# Patient Record
Sex: MALE | Race: WHITE | NOT HISPANIC OR LATINO | Employment: OTHER | URBAN - NONMETROPOLITAN AREA
[De-identification: names, ages, dates, MRNs, and addresses within clinical notes are randomized per-mention and may not be internally consistent; named-entity substitution may affect disease eponyms.]

---

## 2017-12-13 ENCOUNTER — HOSPITAL ENCOUNTER (OUTPATIENT)
Dept: OCCUPATIONAL THERAPY | Facility: HOSPITAL | Age: 52
Setting detail: THERAPIES SERIES
Discharge: HOME | End: 2017-12-13

## 2017-12-13 DIAGNOSIS — R60.0 BILATERAL LOWER EXTREMITY EDEMA: Primary | ICD-10-CM

## 2017-12-13 PROCEDURE — 97167 OT EVAL HIGH COMPLEX 60 MIN: CPT

## 2017-12-13 PROCEDURE — 97139 UNLISTED THERAPEUTIC PX: CPT

## 2017-12-13 PROCEDURE — 97535 SELF CARE MNGMENT TRAINING: CPT

## 2017-12-13 NOTE — THERAPY EVALUATION
Outpatient Occupational Therapy Lymphedema Initial Evaluation   [place]     Patient Name: El Nielsen  : 1965  MRN: [id number]  Today's Date: 2017      Visit Date: 2017    There is no problem list on file for this patient.       Past Medical History:   Diagnosis Date   • Arthritis    • Hypertension         Past Surgical History:   Procedure Laterality Date   • EYE SURGERY     • VASCULAR SURGERY           Visit Dx:     ICD-10-CM ICD-9-CM   1. Bilateral lower extremity edema R60.0 782.3             Patient History       17 1400          History    Chief Complaint Balance Problems;Difficulty Walking;Difficulty with daily activities;Fatigue/poor endurance;Impaired sensation;Joint stiffness;Joint swelling;Pain;Numbness;Tingling  -BC      Type of Pain Ankle pain;Foot pain;Knee pain;Lower Extremity / Leg  -BC      Date Current Problem(s) Began 14  -BC      Brief Description of Current Complaint B LE swelling  -BC      Previous treatment for THIS PROBLEM Medication;Surgery  -BC      Onset Date- OT --   2017  -BC      Surgery Date: 17  -BC      Patient/Caregiver Goals Relieve pain;Return to prior level of function;Improve mobility;Know what to do to help the symptoms;Decrease swelling  -BC      Current Tobacco Use < 1 pack daily  -BC      Patient's Rating of General Health Fair  -BC      Hand Dominance right-handed  -BC      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 No  -BC      How has patient tried to help current problem? surgery, medicine  -BC      What clinical tests have you had for this problem? --   ultrasound  -BC      Related/Recent Hospitalizations Yes  -BC      Surgery Date 1 17   Approximate date  -BC      History of Previous Related Injuries L knee scope x 3  -BC      Pain     Pain Location Foot;Knee;Leg  -BC      Pain at Present 3  -BC      Pain  at Best 0  -BC      Pain at Worst 7  -BC      Pain Frequency Constant/continuous  -BC      Pain Description Aching;Numbness;Penetrating;Pins and needles;Sharp;Shooting;Tightness;Tender;Throbbing  -BC      What Performance Factors Make the Current Problem(s) WORSE? Being up on it  -BC      What Performance Factors Make the Current Problem(s) BETTER? Pain medications (over the counter)  -BC      Pain Comments Aleive helps  -BC      Tolerance Time- Standing 60-90 minutes  -BC      Tolerance Time- Walking 30 min.  -BC      Is your sleep disturbed? No  -BC      Is medication used to assist with sleep? No  -BC      Total hours of sleep per night 4-5 hours  -BC      What position do you sleep in? Left sidelying;Supine  -BC      Difficulties at work? With standing  -BC      Difficulties with ADL's? yes  -BC      Difficulties with recreational activities? yes  -BC      Fall Risk Assessment    Any falls in the past year: No  -BC      Daily Activities    Primary Language English  -BC      How does patient learn best? Demonstration  -BC      Patient is concerned about/has problems with Difficulty with self care (i.e. bathing, dressing, toileting:;Coordination;Performing job responsibilities/community activities (work, school,  -BC      Pt Participated in POC and Goals Yes  -BC      Safety    Are you being hurt, hit, or frightened by anyone at home or in your life? No  -BC      Are you being neglected by a caregiver No  -BC        User Key  (r) = Recorded By, (t) = Taken By, (c) = Cosigned By    Initials Name Provider Type    [facility], OT Occupational Therapist                Lymphedema       12/13/17 1400          Lymphedema Assessment    Lymphedema Classification RLE:;LLE:;primary;stage 1 (Spontaneously Reversible);stage 2 (Spontaneously Irreversible)  -BC      Infections or Cellulitis? unspecified  -BC      Infection/Cellulitis Treatment antibiotics  -BC      Lymphedema Assessment Comments Pt. with B LE lymphedema.   -BC      Skin Changes/Observations    Location/Assessment Lower Extremity  -BC      Lower Extremity Conditions bilateral:;clean;dry;scaly  -BC      Lower Extremity Color/Pigment hyperpigmented;bilateral:;red;purple  -BC      Skin Observations Comment very dry  -BC      Lymphedema Sensation    Lymphedema Sensation Reports RLE:;LLE:;numbness;tingling  -BC      Lymphedema Sensation Tests light touch;deep touch  -BC      Lymphedema Measurements    Measurement Type(s) Circumferential  -BC      Compression/Skin Care    Compression/Skin Care skin care  -BC      Skin Care lotion applied;moisturizing lotion applied  -BC      Compression/Skin Care Comments educated patient to skin care requirements.  -BC        User Key  (r) = Recorded By, (t) = Taken By, (c) = Cosigned By    Initials Name Provider Type    TRACEY Bowman, OT Occupational Therapist                  Therapy Education  Given: Symptoms/condition management, Pain management, Edema management  Program: New  How Provided: Verbal, Demonstration  Provided to: Patient  Level of Understanding: Verbalized                  OT Goals       12/13/17 1500       OT Short Term Goals    STG Date to Achieve 12/27/17  -BC     STG 1 Pt. famaliar with precautions skin care and self management of lymphedema.  -BC     STG 2 Pt. HEP to assist with improved lymphatic flow  -BC     STG 3 Self care instructions for home MLD for volume reduction.  -BC     STG 4 Reduction of volume by 5% to reduce risk of infection.  -BC     Long Term Goals    LTG Date to Achieve 01/13/18  -BC     LTG 1 Pt. and/or caregiver independent with short stretch compression bandaging for volume reduction.  -BC     LTG 2 Independent with donning/doffing compression garment.  -BC     LTG 3 Independent with lymphedema management and HEP  -BC     Time Calculation    OT Goal Re-Cert Due Date 01/13/18  -BC       User Key  (r) = Recorded By, (t) = Taken By, (c) = Cosigned By    Initials Name Provider Type    TRACEY Gamez  [place], OT Occupational Therapist                OT Assessment/Plan       12/13/17 1547       OT Assessment    Functional Limitations Limitation in home management;Limitations in community activities;Performance in self-care ADL;Limitations in functional capacity and performance  -BC     Impairments Edema;Endurance;Impaired flexibility;Impaired lymphatic circulation;Range of motion;Pain  -BC     Assessment Comments Pt. presents on this date with lymphedema in B LE, measurements taken and POC established.  Pt to begin MLD, and full lymph tx. on follow up visit.  Pt. educated to skin care, and lotion applied to B LE (cocoa butter)  -BC     Please refer to paper survey for additional self-reported information Yes  -BC     OT Diagnosis B LE Lymphedema  -BC     OT Rehab Potential Excellent  -BC     Patient/caregiver participated in establishment of treatment plan and goals Yes  -BC     Patient would benefit from skilled therapy intervention Yes  -BC     OT Plan    OT Frequency 3x/week  -BC     Predicted Duration of Therapy Intervention (days/wks) 4 weeks  -BC     Planned CPT's? OT EVAL HIGH COMPLEXITY: 31603;OT THER ACT EA 15 MIN: 54410CC;OT MANUAL THERAPY EA 15 MIN: 67570;OT SELF CARE/MGMT/TRAIN 15 MIN: 40102;OT VASOPNEUMATIC DEVICE: 51174  -BC     Planned Therapy Interventions (Optional Details) home exercise program;manual therapy techniques;stretching;ROM (Range of Motion);patient/family education;other (see comments)  -BC     OT Plan Comments MLD education  -BC       User Key  (r) = Recorded By, (t) = Taken By, (c) = Cosigned By    Initials Name Provider Type    [facility], OT Occupational Therapist          Outcome Measure Options: AM-PAC 6 Clicks Daily Activity (OT)  How much help from another is currently needed...  Putting on and taking off regular lower body clothing?: a little  Bathing (including washing, rinsing, and drying): a little  Toileting (which includes using toilet bed pan or urinal):  none  Putting on and taking off regular upper body clothing: none  Taking care of personal grooming (such as brushing teeth): none  Eating meals: none  Score: 22         Time Calculation:   OT Start Time: 1400  OT Stop Time: 1520  OT Time Calculation (min): 80 min     Therapy Charges for Today     Code Description Service Date Service Provider Modifiers Qty    48815668648 HC OT EVAL HIGH COMPLEXITY 3 2017 [facility], OT GO 1    98129754900 HC LYMPHEDEMA MANAGEMENT-15 MIN 2017 [facility], OT  1    91252241149 HC OT SELF CARE/MGMT/TRAIN EA 15 MIN 2017 [facility], OT GO 1                    [facility], OT  2017 and Outpatient Occupational Therapy Lymphedema Treatment Note  CASSANDRA Alonso     Patient Name: El Nielsen  : 1965  MRN: [id number]  Today's Date: 2017      Visit Date: 2017    There is no problem list on file for this patient.       Past Medical History:   Diagnosis Date   • Arthritis    • Hypertension         Past Surgical History:   Procedure Laterality Date   • EYE SURGERY     • VASCULAR SURGERY           Visit Dx:      ICD-10-CM ICD-9-CM   1. Bilateral lower extremity edema R60.0 782.3             Lymphedema       17 1400          Lymphedema Assessment    Lymphedema Classification RLE:;LLE:;primary;stage 1 (Spontaneously Reversible);stage 2 (Spontaneously Irreversible)  -BC      Infections or Cellulitis? unspecified  -BC      Infection/Cellulitis Treatment antibiotics  -BC      Lymphedema Assessment Comments Pt. with B LE lymphedema.  -BC      Skin Changes/Observations    Location/Assessment Lower Extremity  -BC      Lower Extremity Conditions bilateral:;clean;dry;scaly  -BC      Lower Extremity Color/Pigment hyperpigmented;bilateral:;red;purple  -BC      Skin Observations Comment very dry  -BC      Lymphedema Sensation    Lymphedema Sensation Reports RLE:;LLE:;numbness;tingling  -BC      Lymphedema Sensation Tests light touch;deep touch   -BC      Lymphedema Measurements    Measurement Type(s) Circumferential  -BC      Compression/Skin Care    Compression/Skin Care skin care  -BC      Skin Care lotion applied;moisturizing lotion applied  -BC      Compression/Skin Care Comments educated patient to skin care requirements.  -BC        User Key  (r) = Recorded By, (t) = Taken By, (c) = Cosigned By    Initials Name Provider Type    [facility], OT Occupational Therapist                        OT Assessment/Plan       12/13/17 4094       OT Assessment    Functional Limitations Limitation in home management;Limitations in community activities;Performance in self-care ADL;Limitations in functional capacity and performance  -BC     Impairments Edema;Endurance;Impaired flexibility;Impaired lymphatic circulation;Range of motion;Pain  -BC     Assessment Comments Pt. presents on this date with lymphedema in B LE, measurements taken and POC established.  Pt to begin MLD, and full lymph tx. on follow up visit.  Pt. educated to skin care, and lotion applied to B LE (cocoa butter)  -BC     Please refer to paper survey for additional self-reported information Yes  -BC     OT Diagnosis B LE Lymphedema  -BC     OT Rehab Potential Excellent  -BC     Patient/caregiver participated in establishment of treatment plan and goals Yes  -BC     Patient would benefit from skilled therapy intervention Yes  -BC     OT Plan    OT Frequency 3x/week  -BC     Predicted Duration of Therapy Intervention (days/wks) 4 weeks  -BC     Planned CPT's? OT EVAL HIGH COMPLEXITY: 23749;OT THER ACT EA 15 MIN: 47368GU;OT MANUAL THERAPY EA 15 MIN: 89767;OT SELF CARE/MGMT/TRAIN 15 MIN: 45031;OT VASOPNEUMATIC DEVICE: 56206  -BC     Planned Therapy Interventions (Optional Details) home exercise program;manual therapy techniques;stretching;ROM (Range of Motion);patient/family education;other (see comments)  -BC     OT Plan Comments MLD education  -BC       User Key  (r) = Recorded By, (t) = Taken  By, (c) = Cosigned By    Initials Name Provider Type    [facility], OT Occupational Therapist                            OT Goals       12/13/17 1500       OT Short Term Goals    STG Date to Achieve 12/27/17  -BC     STG 1 Pt. famaliar with precautions skin care and self management of lymphedema.  -BC     STG 2 Pt. HEP to assist with improved lymphatic flow  -BC     STG 3 Self care instructions for home MLD for volume reduction.  -BC     STG 4 Reduction of volume by 5% to reduce risk of infection.  -BC     Long Term Goals    LTG Date to Achieve 01/13/18  -BC     LTG 1 Pt. and/or caregiver independent with short stretch compression bandaging for volume reduction.  -BC     LTG 2 Independent with donning/doffing compression garment.  -BC     LTG 3 Independent with lymphedema management and HEP  -BC     Time Calculation    OT Goal Re-Cert Due Date 01/13/18  -BC       User Key  (r) = Recorded By, (t) = Taken By, (c) = Cosigned By    Initials Name Provider Type    [facility], OT Occupational Therapist          Therapy Education  Given: Symptoms/condition management, Pain management, Edema management  Program: New  How Provided: Verbal, Demonstration  Provided to: Patient  Level of Understanding: Verbalized    Outcome Measure Options: AM-PAC 6 Clicks Daily Activity (OT)  How much help from another is currently needed...  Putting on and taking off regular lower body clothing?: a little  Bathing (including washing, rinsing, and drying): a little  Toileting (which includes using toilet bed pan or urinal): none  Putting on and taking off regular upper body clothing: none  Taking care of personal grooming (such as brushing teeth): none  Eating meals: none  Score: 22           Time Calculation:   OT Start Time: 1400  OT Stop Time: 1520  OT Time Calculation (min): 80 min       Therapy Charges for Today     Code Description Service Date Service Provider Modifiers Qty    45611336355 HC OT EVAL HIGH COMPLEXITY 3  12/13/2017 [facility], OT GO 1    [id number] HC LYMPHEDEMA MANAGEMENT-15 MIN 12/13/2017 [facility], OT  1    [id number] HC OT SELF CARE/MGMT/TRAIN EA 15 MIN 12/13/2017 [facility], OT GO 1                      [facility], OT  12/13/2017

## 2017-12-20 PROCEDURE — 97140 MANUAL THERAPY 1/> REGIONS: CPT

## 2017-12-20 PROCEDURE — 97530 THERAPEUTIC ACTIVITIES: CPT

## 2017-12-20 PROCEDURE — 97535 SELF CARE MNGMENT TRAINING: CPT

## 2018-01-24 ENCOUNTER — DOCUMENTATION (OUTPATIENT)
Dept: OCCUPATIONAL THERAPY | Facility: HOSPITAL | Age: 53
End: 2018-01-24

## 2018-01-24 ENCOUNTER — HOSPITAL ENCOUNTER (OUTPATIENT)
Dept: OCCUPATIONAL THERAPY | Facility: HOSPITAL | Age: 53
Setting detail: THERAPIES SERIES
Discharge: HOME | End: 2018-01-24

## 2018-01-24 DIAGNOSIS — R60.0 BILATERAL LOWER EXTREMITY EDEMA: Primary | ICD-10-CM

## 2018-01-24 PROCEDURE — 97140 MANUAL THERAPY 1/> REGIONS: CPT

## 2018-01-24 PROCEDURE — 97167 OT EVAL HIGH COMPLEX 60 MIN: CPT

## 2018-01-24 PROCEDURE — 97139 UNLISTED THERAPEUTIC PX: CPT

## 2018-01-24 NOTE — SIGNIFICANT NOTE
01/24/18 1517   OP OT Discharge Summary   Date of Discharge 01/23/18   Reason for Discharge Unable to participate   Outcomes Achieved Unable to make functional progress toward goals at this time   Discharge Destination Home without follow-up   Discharge Instructions D/C OT POC

## 2018-01-24 NOTE — THERAPY EVALUATION
Outpatient Occupational Therapy Lymphedema Initial Evaluation       Patient Name: El Nielsen  : 1965  MRN: [id number]  Today's Date: 2018      Visit Date: 2018    There is no problem list on file for this patient.       Past Medical History:   Diagnosis Date   • Arthritis    • Hypertension         Past Surgical History:   Procedure Laterality Date   • EYE SURGERY     • VASCULAR SURGERY           Visit Dx:     ICD-10-CM ICD-9-CM   1. Bilateral lower extremity edema R60.0 782.3             Patient History       18 1500          History    Chief Complaint Balance Problems;Difficulty Walking;Difficulty with daily activities;Fatigue/poor endurance;Impaired sensation;Joint stiffness;Joint swelling;Pain;Numbness;Tingling  -BC      Type of Pain Ankle pain;Foot pain;Knee pain;Lower Extremity / Leg  -BC      Date Current Problem(s) Began 14  -BC      Brief Description of Current Complaint B LE swelling  -BC      Previous treatment for THIS PROBLEM Massage;Rehabilitation  -BC      Onset Date- OT 2018  -BC      Patient/Caregiver Goals Relieve pain;Return to prior level of function;Improve mobility;Know what to do to help the symptoms;Decrease swelling  -BC      Current Tobacco Use Non smoker  -BC      Patient's Rating of General Health Fair  -BC      Hand Dominance right-handed  -BC      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 No  -BC      How has patient tried to help current problem? surgery, medicine  -BC      Pain     Pain Location Ankle;Foot;Leg  -BC      Pain at Present 5  -BC      Pain at Best 0  -BC      Pain at Worst 10  -BC      Pain Frequency Constant/continuous  -BC      Pain Description Cramping;Discomfort;Heaviness;Numbness;Pins and needles;Sharp;Shooting  -BC      What Performance Factors Make the Current Problem(s) WORSE? Standing/walking  -BC      What  Performance Factors Make the Current Problem(s) BETTER? Laying down  -BC      Pain Comments Feels like a bee stinging   -BC      Tolerance Time- Standing impaired  -BC      Tolerance Time- Sitting fair  -BC      Tolerance Time- Walking impaired  -BC      Tolerance Time- Lying wfl  -BC      Is your sleep disturbed? Yes  -BC      Is medication used to assist with sleep? No  -BC      Total hours of sleep per night 5-6  -BC      What position do you sleep in? Supine  -BC      Difficulties at work? yes  -BC      Difficulties with ADL's? yes  -BC      Difficulties with recreational activities? yes  -BC      Fall Risk Assessment    Any falls in the past year: No  -BC      Services    Prior Rehab/Home Health Experiences Yes  -BC      When was the prior experience with Rehab/Home Health 1 month previous  -BC      Where was the prior experience with Rehab/Home Health [facility]  -BC      Are you currently receiving Home Health services No  -BC      Daily Activities    Primary Language English  -BC      How does patient learn best? Demonstration  -BC      Patient is concerned about/has problems with Difficulty with self care (i.e. bathing, dressing, toileting:;Flexibility;Performing job responsibilities/community activities (work, school,;Standing;Walking  -BC      Functional Status mobility issues preventing performance of daily activities  -BC      Pt Participated in POC and Goals Yes  -BC      Safety    Are you being hurt, hit, or frightened by anyone at home or in your life? Other (comment)  -BC      Are you being neglected by a caregiver No  -BC        User Key  (r) = Recorded By, (t) = Taken By, (c) = Cosigned By    Initials Name Provider Type    [facility], OT Occupational Therapist                Lymphedema       01/24/18 1500          Subjective Comments    Subjective Comments I use my pump at home , but they seem to be getting worse.  -BC      Lymphedema Assessment    Lymphedema Classification RLE:;LLE:;stage 2  (Spontaneously Irreversible)  -BC      Subjective Pain    Able to rate subjective pain? yes  -BC      Pre-Treatment Pain Level 4  -BC      Lymphedema Edema Assessment    Ptting Edema Category By grade out of 4  -BC      Pitting Edema + 4/4;Moderate  -BC      Skin Changes/Observations    Location/Assessment Lower Extremity  -BC      Lower Extremity Conditions bilateral:;intact;dry;shiny;hairless;scaly  -BC      Lower Extremity Color/Pigment bilateral:;red;blanchable;hyperpigmented  -BC      Lymphedema Sensation    Lymphedema Sensation Reports RLE:;LLE:;numbness;tingling  -BC      Lymphedema Sensation Tests temperature  -BC      Lymphedema Measurements    Measurement Type(s) Circumferential  -BC      Circumferential Areas Lower extremities  -BC      LLE Circumferential (cm)    Measurement Location 1 great toe  -BC      Left 1 10.9 cm  -BC      Measurement Location 2 Base of great toe  -BC      Left 2 31 cm  -BC      Measurement Location 3 ankle  -BC      Left 3 34.3 cm  -BC      Measurement Location 4  10 cm superior to ankle crease  -BC      Left 4 33.3 cm  -BC      Measurement Location 5 20 cm superior to ankle crease  -BC      Left 5 44.4 cm  -BC      Measurement Location 6 30 cm superior to ankle  -BC      Left 6 52.2 cm  -BC      Measurement Location 7 Knee at popliteal crease  -BC      Left 7 47.2 cm  -BC      Measurement Location 8 10 cm. superior to popliteal crease  -BC      Left 8 57 cm  -BC      RLE Circumferential (cm)    Measurement Location 1 great toe  -BC      Right 1 12.3 cm  -BC      Measurement Location 2 Base of great toe  -BC      Right 2 30.9 cm  -BC      Measurement Location 3 ankle  -BC      Right 3 40.8 cm  -BC      Measurement Location 4 10 cm superior to ankle  -BC      Right 4 40.3 cm  -BC      Measurement Location 5 20 cm superior to ankle  -BC      Right 5 52 cm  -BC      Measurement Location 6 30 cm superior to ankle  -BC      Right 6 55 cm  -BC      Measurement Location 7 Knee  -BC       Right 7 46.7 cm  -BC      Measurement Location 8 10cm superior  -BC      Right 8 60.5 cm  -BC      Manual Lymphatic Drainage    Manual Lymphatic Drainage extremity treatment  -BC      Extremity Treatment simple/brief MLD  -BC      Manual Lymphatic Drainage Comments Pt. RLE with incresed tightness/resistance at calf muscle.  -BC      Compression/Skin Care    Compression/Skin Care skin care;bandaging  -BC      Skin Care moisturizing lotion applied  -BC      Bandage Layers cotton liner;soft foam- 1/4 inch;short-stretch bandages (comment size/quantity)   2 each soft foam, and short stretch bandages on BLE.  -BC      Bandaging Technique circumferential/spiral;light compression  -BC        User Key  (r) = Recorded By, (t) = Taken By, (c) = Cosigned By    Initials Name Provider Type    [facility], OT Occupational Therapist                                     OT Goals       01/24/18 1600       OT Short Term Goals    STG Date to Achieve 02/24/18  -BC     STG 1 Pt. famaliar with precautions skin care and self management of lymphedema.  -BC     STG 2 Pt. HEP to assist with improved lymphatic flow  -BC     STG 3 Self care instructions for home MLD for volume reduction.  -BC     STG 4 Reduction of volume by 5% to reduce risk of infection.  -BC     Long Term Goals    LTG Date to Achieve 04/24/18  -BC     LTG 1 Pt. and/or caregiver independent with short stretch compression bandaging for volume reduction.  -BC     LTG 2 Independent with donning/doffing compression garment.  -BC     LTG 3 Independent with lymphedema management and HEP  -BC     Time Calculation    OT Goal Re-Cert Due Date 04/24/18  -BC       User Key  (r) = Recorded By, (t) = Taken By, (c) = Cosigned By    Initials Name Provider Type    [facility], OT Occupational Therapist                OT Assessment/Plan       01/24/18 1656       OT Assessment    Functional Limitations Performance in self-care ADL;Limitations in functional capacity and  performance;Performance in work activities  -BC     Impairments Edema;Endurance;Impaired flexibility;Impaired lymphatic circulation  -BC     Please refer to paper survey for additional self-reported information Yes  -BC     OT Diagnosis B LE lymphedema  -BC     OT Rehab Potential Excellent  -BC     Patient/caregiver participated in establishment of treatment plan and goals Yes  -BC     Patient would benefit from skilled therapy intervention Yes  -BC     OT Plan    OT Frequency 3x/week  -BC     Predicted Duration of Therapy Intervention (days/wks) 90 days  -BC     Planned CPT's? OT EVAL HIGH COMPLEXITY: 75982;OT SELF CARE/MGMT/TRAIN 15 MIN: 81444;OT VASOPNEUMATIC DEVICE: 27709;OT THER ACT EA 15 MIN: 79327JT;OT MANUAL THERAPY EA 15 MIN: 74993   lymphedema management  -BC     Planned Therapy Interventions (Optional Details) home exercise program;manual therapy techniques;patient/family education  -BC       User Key  (r) = Recorded By, (t) = Taken By, (c) = Cosigned By    Initials Name Provider Type    BC Vera Bowman, OT Occupational Therapist          Outcome Measure Options: AM-PAC 6 Clicks Daily Activity (OT)  How much help from another is currently needed...  Putting on and taking off regular lower body clothing?: a little  Bathing (including washing, rinsing, and drying): a little  Toileting (which includes using toilet bed pan or urinal): none  Putting on and taking off regular upper body clothing: none  Taking care of personal grooming (such as brushing teeth): none  Eating meals: none  Score: 22         Time Calculation:   OT Start Time: 1500  OT Stop Time: 1645  OT Time Calculation (min): 105 min                   Vera Bowman OT  2018 and Outpatient Occupational Therapy Lymphedema Treatment Note       Patient Name: El Nielsen  : 1965  MRN: [id number]  Today's Date: 2018      Visit Date: 2018    There is no problem list on file for this patient.       Past Medical History:    Diagnosis Date   • Arthritis    • Hypertension         Past Surgical History:   Procedure Laterality Date   • EYE SURGERY     • VASCULAR SURGERY           Visit Dx:      ICD-10-CM ICD-9-CM   1. Bilateral lower extremity edema R60.0 782.3             Lymphedema       01/24/18 1500          Subjective Comments    Subjective Comments I use my pump at home , but they seem to be getting worse.  -BC      Lymphedema Assessment    Lymphedema Classification RLE:;LLE:;stage 2 (Spontaneously Irreversible)  -BC      Subjective Pain    Able to rate subjective pain? yes  -BC      Pre-Treatment Pain Level 4  -BC      Lymphedema Edema Assessment    Ptting Edema Category By grade out of 4  -BC      Pitting Edema + 4/4;Moderate  -BC      Skin Changes/Observations    Location/Assessment Lower Extremity  -BC      Lower Extremity Conditions bilateral:;intact;dry;shiny;hairless;scaly  -BC      Lower Extremity Color/Pigment bilateral:;red;blanchable;hyperpigmented  -BC      Lymphedema Sensation    Lymphedema Sensation Reports RLE:;LLE:;numbness;tingling  -BC      Lymphedema Sensation Tests temperature  -BC      Lymphedema Measurements    Measurement Type(s) Circumferential  -BC      Circumferential Areas Lower extremities  -BC      LLE Circumferential (cm)    Measurement Location 1 great toe  -BC      Left 1 10.9 cm  -BC      Measurement Location 2 Base of great toe  -BC      Left 2 31 cm  -BC      Measurement Location 3 ankle  -BC      Left 3 34.3 cm  -BC      Measurement Location 4  10 cm superior to ankle crease  -BC      Left 4 33.3 cm  -BC      Measurement Location 5 20 cm superior to ankle crease  -BC      Left 5 44.4 cm  -BC      Measurement Location 6 30 cm superior to ankle  -BC      Left 6 52.2 cm  -BC      Measurement Location 7 Knee at popliteal crease  -BC      Left 7 47.2 cm  -BC      Measurement Location 8 10 cm. superior to popliteal crease  -BC      Left 8 57 cm  -BC      RLE Circumferential (cm)    Measurement Location  1 great toe  -BC      Right 1 12.3 cm  -BC      Measurement Location 2 Base of great toe  -BC      Right 2 30.9 cm  -BC      Measurement Location 3 ankle  -BC      Right 3 40.8 cm  -BC      Measurement Location 4 10 cm superior to ankle  -BC      Right 4 40.3 cm  -BC      Measurement Location 5 20 cm superior to ankle  -BC      Right 5 52 cm  -BC      Measurement Location 6 30 cm superior to ankle  -BC      Right 6 55 cm  -BC      Measurement Location 7 Knee  -BC      Right 7 46.7 cm  -BC      Measurement Location 8 10cm superior  -BC      Right 8 60.5 cm  -BC      Manual Lymphatic Drainage    Manual Lymphatic Drainage extremity treatment  -BC      Extremity Treatment simple/brief MLD  -BC      Manual Lymphatic Drainage Comments Pt. RLE with incresed tightness/resistance at calf muscle.  -BC      Compression/Skin Care    Compression/Skin Care skin care;bandaging  -BC      Skin Care moisturizing lotion applied  -BC      Bandage Layers cotton liner;soft foam- 1/4 inch;short-stretch bandages (comment size/quantity)   2 each soft foam, and short stretch bandages on BLE.  -BC      Bandaging Technique circumferential/spiral;light compression  -BC        User Key  (r) = Recorded By, (t) = Taken By, (c) = Cosigned By    Initials Name Provider Type    [facility], OT Occupational Therapist                        OT Assessment/Plan       01/24/18 7064       OT Assessment    Functional Limitations Performance in self-care ADL;Limitations in functional capacity and performance;Performance in work activities  -BC     Impairments Edema;Endurance;Impaired flexibility;Impaired lymphatic circulation  -BC     Please refer to paper survey for additional self-reported information Yes  -BC     OT Diagnosis B LE lymphedema  -BC     OT Rehab Potential Excellent  -BC     Patient/caregiver participated in establishment of treatment plan and goals Yes  -BC     Patient would benefit from skilled therapy intervention Yes  -BC     OT Plan     OT Frequency 3x/week  -BC     Predicted Duration of Therapy Intervention (days/wks) 90 days  -BC     Planned CPT's? OT EVAL HIGH COMPLEXITY: 10471;OT SELF CARE/MGMT/TRAIN 15 MIN: 99707;OT VASOPNEUMATIC DEVICE: 01736;OT THER ACT EA 15 MIN: 30181XY;OT MANUAL THERAPY EA 15 MIN: 24771   lymphedema management  -BC     Planned Therapy Interventions (Optional Details) home exercise program;manual therapy techniques;patient/family education  -BC       User Key  (r) = Recorded By, (t) = Taken By, (c) = Cosigned By    Initials Name Provider Type    [facility], OT Occupational Therapist                            OT Goals       01/24/18 1600       OT Short Term Goals    STG Date to Achieve 02/24/18  -BC     STG 1 Pt. famaliar with precautions skin care and self management of lymphedema.  -BC     STG 2 Pt. HEP to assist with improved lymphatic flow  -BC     STG 3 Self care instructions for home MLD for volume reduction.  -BC     STG 4 Reduction of volume by 5% to reduce risk of infection.  -BC     Long Term Goals    LTG Date to Achieve 04/24/18  -BC     LTG 1 Pt. and/or caregiver independent with short stretch compression bandaging for volume reduction.  -BC     LTG 2 Independent with donning/doffing compression garment.  -BC     LTG 3 Independent with lymphedema management and HEP  -BC     Time Calculation    OT Goal Re-Cert Due Date 04/24/18  -BC       User Key  (r) = Recorded By, (t) = Taken By, (c) = Cosigned By    Initials Name Provider Type    [facility], OT Occupational Therapist               Outcome Measure Options: AM-PAC 6 Clicks Daily Activity (OT)  How much help from another is currently needed...  Putting on and taking off regular lower body clothing?: a little  Bathing (including washing, rinsing, and drying): a little  Toileting (which includes using toilet bed pan or urinal): none  Putting on and taking off regular upper body clothing: none  Taking care of personal grooming (such as brushing  teeth): none  Eating meals: none  Score: 22           Time Calculation:   OT Start Time: 1500  OT Stop Time: 1645  OT Time Calculation (min): 105 min                       Vera Bowman, OT  1/24/2018

## 2018-01-29 ENCOUNTER — HOSPITAL ENCOUNTER (OUTPATIENT)
Dept: OCCUPATIONAL THERAPY | Facility: HOSPITAL | Age: 53
Setting detail: THERAPIES SERIES
Discharge: HOME | End: 2018-01-29

## 2018-01-29 DIAGNOSIS — R60.0 BILATERAL LOWER EXTREMITY EDEMA: Primary | ICD-10-CM

## 2018-01-29 PROCEDURE — 97140 MANUAL THERAPY 1/> REGIONS: CPT

## 2018-01-29 NOTE — THERAPY TREATMENT NOTE
Outpatient Occupational Therapy Lymphedema Treatment Note   [place]     Patient Name: El Nielsen  : 1965  MRN: [id number]  Today's Date: 2018      Visit Date: 2018    There is no problem list on file for this patient.       Past Medical History:   Diagnosis Date   • Arthritis    • Hypertension         Past Surgical History:   Procedure Laterality Date   • EYE SURGERY     • VASCULAR SURGERY           Visit Dx:      ICD-10-CM ICD-9-CM   1. Bilateral lower extremity edema R60.0 782.3             Lymphedema       18 0800          Subjective Comments    Subjective Comments I haven't worked all week, this is the best they have looked in a long time.   -BC      Lymphedema Assessment    Lymphedema Classification stage 1 (Spontaneously Reversible)  -BC      Chemo Received no  -BC      Radiation Therapy Received no  -BC      Infections or Cellulitis? no  -BC      Lymphedema Assessment Comments No cancer  -BC      Subjective Pain    Able to rate subjective pain? yes  -BC      Pre-Treatment Pain Level 0  -BC      Subjective Pain Comment I can feel it but its not hurting this morning.  -BC      Pain Assessment    Pain Assessment No/denies pain  -BC      Lymphedema Edema Assessment    Ptting Edema Category By grade out of 4  -BC      Pitting Edema + 2/4  -BC      Skin Changes/Observations    Location/Assessment Lower Extremity  -BC      Lower Extremity Conditions bilateral:;intact;dry;shiny;hairless;scaly  -BC      Lower Extremity Color/Pigment bilateral:;red;blanchable;hyperpigmented  -BC      Lymphedema Measurements    Measurement Type(s) Circumferential  -BC      Circumferential Areas Lower extremities  -BC      LLE Circumferential (cm)    Left 1 10.6 cm  -BC      Left 2 29.5 cm  -BC      Left 3 32.2 cm  -BC      Left 4 33.4 cm  -BC      Left 5 41.8 cm  -BC      Left 6 51 cm  -BC      Left 7 45.8 cm  -BC      Left 8 56 cm  -BC      RLE Circumferential (cm)    Right 1 10.8 cm  -BC      Right 2  31 cm  -BC      Right 3 36.8 cm  -BC      Right 4 37 cm  -BC      Right 5 45.3 cm  -BC      Right 6 52.4 cm  -BC      Right 7 45.5 cm  -BC      Right 8 59.1 cm  -BC      Manual Lymphatic Drainage    Manual Lymphatic Drainage extremity treatment  -BC      Extremity Treatment MLD to full limb;extremity treatment focus on  -BC      Compression/Skin Care    Compression/Skin Care skin care  -BC      Skin Care moisturizing lotion applied  -BC      Compression/Skin Care Comments Pt. did not bring bandaging materials stating that he was going to see MD this AM and that they would need to see his LE's without bandaging.  Pt. reports ordering compression garments for B LE as requested.  -BC        User Key  (r) = Recorded By, (t) = Taken By, (c) = Cosigned By    Initials Name Provider Type    [facility], OT Occupational Therapist                                                       Time Calculation:   OT Start Time: 0815  OT Stop Time: 0855  OT Time Calculation (min): 40 min       Therapy Charges for Today     Code Description Service Date Service Provider Modifiers Qty    [id number] HC OT MANUAL THERAPY EA 15 MIN 1/29/2018 [facility], OT GO 2                      [facility], OT  1/29/2018

## 2018-01-31 ENCOUNTER — HOSPITAL ENCOUNTER (OUTPATIENT)
Dept: OCCUPATIONAL THERAPY | Facility: HOSPITAL | Age: 53
Setting detail: THERAPIES SERIES
Discharge: HOME | End: 2018-01-31

## 2018-01-31 DIAGNOSIS — R60.0 BILATERAL LOWER EXTREMITY EDEMA: Primary | ICD-10-CM

## 2018-01-31 PROCEDURE — 97530 THERAPEUTIC ACTIVITIES: CPT

## 2018-01-31 PROCEDURE — 97140 MANUAL THERAPY 1/> REGIONS: CPT

## 2018-03-09 ENCOUNTER — HOSPITAL ENCOUNTER (OUTPATIENT)
Dept: OCCUPATIONAL THERAPY | Facility: HOSPITAL | Age: 53
Setting detail: THERAPIES SERIES
Discharge: HOME | End: 2018-03-09

## 2018-03-09 DIAGNOSIS — R60.0 BILATERAL LOWER EXTREMITY EDEMA: Primary | ICD-10-CM

## 2018-03-09 PROCEDURE — 97140 MANUAL THERAPY 1/> REGIONS: CPT

## 2018-03-09 PROCEDURE — 97139 UNLISTED THERAPEUTIC PX: CPT

## 2018-03-09 PROCEDURE — 97165 OT EVAL LOW COMPLEX 30 MIN: CPT

## 2018-03-09 NOTE — THERAPY EVALUATION
Outpatient Occupational Therapy Lymphedema Initial Evaluation   [place]     Patient Name: El Nielsen  : 1965  MRN: [id number]  Today's Date: 3/9/2018      Visit Date: 2018    There is no problem list on file for this patient.       Past Medical History:   Diagnosis Date   • Arthritis    • Hypertension         Past Surgical History:   Procedure Laterality Date   • EYE SURGERY     • VASCULAR SURGERY           Visit Dx:     ICD-10-CM ICD-9-CM   1. Bilateral lower extremity edema R60.0 782.3             Patient History       18 1000          History    Chief Complaint Balance Problems;Difficulty Walking;Difficulty with daily activities;Fatigue/poor endurance;Impaired sensation;Joint stiffness;Joint swelling;Pain;Numbness;Tingling  -BC      Type of Pain Ankle pain;Foot pain;Knee pain;Lower Extremity / Leg  -BC      Date Current Problem(s) Began 14  -BC      Brief Description of Current Complaint B LE swelling  -BC      Previous treatment for THIS PROBLEM Massage;Rehabilitation  -BC      Surgery Date: 17  -BC      Patient/Caregiver Goals Relieve pain;Return to prior level of function;Improve mobility;Know what to do to help the symptoms;Decrease swelling  -BC      Current Tobacco Use Non smoker  -BC      Patient's Rating of General Health Fair  -BC      Hand Dominance right-handed  -BC      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 No  -BC      How has patient tried to help current problem? surgery, medicine  -BC      Pain     Pain Location Ankle;Back;Knee;Leg;Other (Comment)   foot  -BC      Pain at Present 5  -BC      Pain at Best 0  -BC      Pain at Worst 8  -BC      Pain Frequency Several days a week  -BC      Pain Description Heaviness;Itching;Numbness;Pins and needles;Pressure;Shooting;Tightness;Tingling  -BC      What Performance Factors Make the Current Problem(s)  WORSE? being on it for long periods of time >3hrs.  -BC      What Performance Factors Make the Current Problem(s) BETTER? pump, elevation, medication  -BC      Tolerance Time- Standing Impaired  -BC      Tolerance Time- Sitting fair  -BC      Tolerance Time- Walking <30 min  -BC      Tolerance Time- Lying wfl  -BC      Is your sleep disturbed? Yes  -BC      Is medication used to assist with sleep? No  -BC      Total hours of sleep per night 4-6 hours  -BC      What position do you sleep in? Supine;Prone;Left sidelying  -BC      Difficulties at work? Yes  -BC      Difficulties with ADL's? Yes  -BC      Difficulties with recreational activities? Yes  -BC      Fall Risk Assessment    Any falls in the past year: No  -BC      Daily Activities    Primary Language English  -BC      How does patient learn best? Demonstration  -BC      Patient is concerned about/has problems with Difficulty with self care (i.e. bathing, dressing, toileting:  -BC      Functional Status mobility issues preventing performance of daily activities  -BC      Pt Participated in POC and Goals Yes  -BC      Safety    Are you being hurt, hit, or frightened by anyone at home or in your life? No  -BC      Are you being neglected by a caregiver No  -BC        User Key  (r) = Recorded By, (t) = Taken By, (c) = Cosigned By    Initials Name Provider Type    [facility], OT Occupational Therapist                Lymphedema       03/09/18 1000          Subjective Comments    Subjective Comments My legs seemed to be getting worse.  -BC      Lymphedema Assessment    Lymphedema Classification stage 1 (Spontaneously Reversible);RLE:;LLE:  -BC      Chemo Received no  -BC      Radiation Therapy Received no  -BC      Infections or Cellulitis? no  -BC      Subjective Pain    Able to rate subjective pain? yes  -BC      Pre-Treatment Pain Level 5  -BC      Lymphedema Edema Assessment    Ptting Edema Category By grade out of 4  -BC      Pitting Edema + 4/4  -BC       Edema Assessment Comment B LE  -BC      Skin Changes/Observations    Location/Assessment Lower Extremity  -BC      Lower Extremity Conditions bilateral:;intact;clean;dry;shiny;scaly;inflamed;other (comment)   beginning of 'orange peel' on R LE  -BC      Lower Extremity Color/Pigment bilateral:;red;non-blanchable;hyperpigmented;brawny;P'eau d'orange  -BC      Skin Observations Comment dry/scaly  -BC      Lymphedema Sensation    Lymphedema Sensation Reports RLE:;LLE:;numbness;tingling  -BC      Lymphedema Sensation Tests temperature  -BC      Lymphedema Light Touch mild impairment  -BC      Lymphedema Deep Touch mild impairment  -BC      Lymphedema Pulses/Capillary Refill    Lymphedema Pulses/Capillary Refill capillary refill  -BC      Capillary Refill lower extremity capillary refill  -BC      Lower Extremity Capillary Refill greater than 3 seconds  -BC      Lymphedema Measurements    Measurement Type(s) Quick Girth;Circumferential  -BC      Quick Girth Areas Lower extremities  -BC      LLE Quick Girth (cm)    Met-heads 30.6 cm  -BC      Smallest ankle 32.8 cm  -BC      Mid patella 45.6 cm  -BC      Distal thigh 57.3 cm  -BC      Proximal thigh 66.7 cm  -BC      RLE Quick Girth (cm)    Met-heads 30.5 cm  -BC      Smallest ankle 31.5 cm  -BC      Mid patella 44.5 cm  -BC      Distal thigh 55.6 cm  -BC      Proximal thigh 63 cm  -BC      LLE Circumferential (cm)    Left 4 33.3 cm  -BC      Left 5 43 cm  -BC      Left 6 79.8 cm  -BC      RLE Circumferential (cm)    Right 4 33.8 cm  -BC      Right 5 43.3 cm  -BC      Right 6 49.8 cm  -BC      Manual Lymphatic Drainage    Manual Lymphatic Drainage extremity treatment  -BC      Opened Regional Lymph Nodes inguinal;ribs  -BC      Inguinal right;left  -BC      Extremity Treatment MLD to full limb;extremity treatment focus on  -BC      Compression/Skin Care    Compression/Skin Care skin care;bandaging  -BC      Skin Care moisturizing lotion applied  -BC      Bandage Layers  cotton elastic stocking- single layer (comment size);padding/fluff layer;short-stretch bandages (comment size/quantity)  -BC      Bandaging Technique circumferential/spiral  -BC      Compression/Skin Care Comments BLE wrapped.  -BC        User Key  (r) = Recorded By, (t) = Taken By, (c) = Cosigned By    Initials Name Provider Type    [facility], OT Occupational Therapist                                     OT Goals       03/09/18 1300       OT Short Term Goals    STG Date to Achieve 04/09/18  -BC     STG 1 Pt. famaliar with precautions skin care and self management of lymphedema.  -BC     STG 2 Pt. HEP to assist with improved lymphatic flow  -BC     STG 3 Self care instructions for home MLD for volume reduction.  -BC     STG 4 Reduction of volume by 5% to reduce risk of infection.  -BC     Long Term Goals    LTG Date to Achieve 04/09/18  -BC     LTG 1 Pt. and/or caregiver independent with short stretch compression bandaging for volume reduction.  -BC     LTG 2 Independent with donning/doffing compression garment.  -BC     LTG 3 Independent with lymphedema management and HEP  -BC     Time Calculation    OT Goal Re-Cert Due Date 06/09/18  -BC       User Key  (r) = Recorded By, (t) = Taken By, (c) = Cosigned By    Initials Name Provider Type    [facility], OT Occupational Therapist                OT Assessment/Plan       03/09/18 1321       OT Assessment    Functional Limitations Impaired gait;Limitations in functional capacity and performance;Performance in leisure activities;Performance in self-care ADL;Performance in work activities  -BC     Impairments Impaired flexibility;Impaired lymphatic circulation;Edema;Endurance  -BC     OT Rehab Potential Good  -BC     Patient/caregiver participated in establishment of treatment plan and goals Yes  -BC     Patient would benefit from skilled therapy intervention Yes  -BC     OT Plan    OT Frequency 3x/week  -BC     Planned CPT's? OT EVAL LOW COMPLEXITY:  71370;OT THER ACT EA 15 MIN: 31199KA;OT MANUAL THERAPY EA 15 MIN: 17319;OT RE-EVAL: 65665;OT SELF CARE/MGMT/TRAIN 15 MIN: 87765;OT VASOPNEUMATIC DEVICE: 78733  -BC     Planned Therapy Interventions (Optional Details) home exercise program;manual therapy techniques;strengthening;ROM (Range of Motion);patient/family education  -BC       User Key  (r) = Recorded By, (t) = Taken By, (c) = Cosigned By    Initials Name Provider Type    [facility], OT Occupational Therapist          Outcome Measure Options: AM-PAC 6 Clicks Daily Activity (OT)  How much help from another is currently needed...  Putting on and taking off regular lower body clothing?: a little  Bathing (including washing, rinsing, and drying): a little  Toileting (which includes using toilet bed pan or urinal): none  Putting on and taking off regular upper body clothing: none  Taking care of personal grooming (such as brushing teeth): none  Eating meals: none  Score: 22         Time Calculation:   OT Start Time: 1000  OT Stop Time: 1130  OT Time Calculation (min): 90 min     Therapy Charges for Today     Code Description Service Date Service Provider Modifiers Qty    50937775706 HC LYMPHEDEMA MANAGEMENT-15 MIN 3/9/2018 [facility], OT  1    54143869480 HC OT MANUAL THERAPY EA 15 MIN 3/9/2018 [facility], OT GO 4    [id number] HC OT EVAL LOW COMPLEXITY 1 3/9/2018 [facility], OT GO 1                    [facility], OT  3/9/2018 and Outpatient Occupational Therapy Lymphedema Treatment Note  [facility]     Patient Name: El Nielsen  : 1965  MRN: [id number]  Today's Date: 3/9/2018      Visit Date: 2018    There is no problem list on file for this patient.       Past Medical History:   Diagnosis Date   • Arthritis    • Hypertension         Past Surgical History:   Procedure Laterality Date   • EYE SURGERY     • VASCULAR SURGERY           Visit Dx:      ICD-10-CM ICD-9-CM   1. Bilateral lower extremity edema R60.0 782.3              Lymphedema       03/09/18 1000          Subjective Comments    Subjective Comments My legs seemed to be getting worse.  -BC      Lymphedema Assessment    Lymphedema Classification stage 1 (Spontaneously Reversible);RLE:;LLE:  -BC      Chemo Received no  -BC      Radiation Therapy Received no  -BC      Infections or Cellulitis? no  -BC      Subjective Pain    Able to rate subjective pain? yes  -BC      Pre-Treatment Pain Level 5  -BC      Lymphedema Edema Assessment    Ptting Edema Category By grade out of 4  -BC      Pitting Edema + 4/4  -BC      Edema Assessment Comment B LE  -BC      Skin Changes/Observations    Location/Assessment Lower Extremity  -BC      Lower Extremity Conditions bilateral:;intact;clean;dry;shiny;scaly;inflamed;other (comment)   beginning of 'orange peel' on R LE  -BC      Lower Extremity Color/Pigment bilateral:;red;non-blanchable;hyperpigmented;brawny;P'eau d'orange  -BC      Skin Observations Comment dry/scaly  -BC      Lymphedema Sensation    Lymphedema Sensation Reports RLE:;LLE:;numbness;tingling  -BC      Lymphedema Sensation Tests temperature  -BC      Lymphedema Light Touch mild impairment  -BC      Lymphedema Deep Touch mild impairment  -BC      Lymphedema Pulses/Capillary Refill    Lymphedema Pulses/Capillary Refill capillary refill  -BC      Capillary Refill lower extremity capillary refill  -BC      Lower Extremity Capillary Refill greater than 3 seconds  -BC      Lymphedema Measurements    Measurement Type(s) Quick Girth;Circumferential  -BC      Quick Girth Areas Lower extremities  -BC      LLE Quick Girth (cm)    Met-heads 30.6 cm  -BC      Smallest ankle 32.8 cm  -BC      Mid patella 45.6 cm  -BC      Distal thigh 57.3 cm  -BC      Proximal thigh 66.7 cm  -BC      RLE Quick Girth (cm)    Met-heads 30.5 cm  -BC      Smallest ankle 31.5 cm  -BC      Mid patella 44.5 cm  -BC      Distal thigh 55.6 cm  -BC      Proximal thigh 63 cm  -BC      LLE Circumferential (cm)     Left 4 33.3 cm  -BC      Left 5 43 cm  -BC      Left 6 79.8 cm  -BC      RLE Circumferential (cm)    Right 4 33.8 cm  -BC      Right 5 43.3 cm  -BC      Right 6 49.8 cm  -BC      Manual Lymphatic Drainage    Manual Lymphatic Drainage extremity treatment  -BC      Opened Regional Lymph Nodes inguinal;ribs  -BC      Inguinal right;left  -BC      Extremity Treatment MLD to full limb;extremity treatment focus on  -BC      Compression/Skin Care    Compression/Skin Care skin care;bandaging  -BC      Skin Care moisturizing lotion applied  -BC      Bandage Layers cotton elastic stocking- single layer (comment size);padding/fluff layer;short-stretch bandages (comment size/quantity)  -BC      Bandaging Technique circumferential/spiral  -BC      Compression/Skin Care Comments BLE wrapped.  -BC        User Key  (r) = Recorded By, (t) = Taken By, (c) = Cosigned By    Initials Name Provider Type    [facility], OT Occupational Therapist                        OT Assessment/Plan       03/09/18 1321       OT Assessment    Functional Limitations Impaired gait;Limitations in functional capacity and performance;Performance in leisure activities;Performance in self-care ADL;Performance in work activities  -BC     Impairments Impaired flexibility;Impaired lymphatic circulation;Edema;Endurance  -BC     OT Rehab Potential Good  -BC     Patient/caregiver participated in establishment of treatment plan and goals Yes  -BC     Patient would benefit from skilled therapy intervention Yes  -BC     OT Plan    OT Frequency 3x/week  -BC     Planned CPT's? OT EVAL LOW COMPLEXITY: 67629;OT THER ACT EA 15 MIN: 25191HG;OT MANUAL THERAPY EA 15 MIN: 07870;OT RE-EVAL: 18904;OT SELF CARE/MGMT/TRAIN 15 MIN: 79933;OT VASOPNEUMATIC DEVICE: 25346  -BC     Planned Therapy Interventions (Optional Details) home exercise program;manual therapy techniques;strengthening;ROM (Range of Motion);patient/family education  -BC       User Key  (r) = Recorded By, (t)  = Taken By, (c) = Cosigned By    Initials Name Provider Type    [facility], OT Occupational Therapist                            OT Goals       03/09/18 1300       OT Short Term Goals    STG Date to Achieve 04/09/18  -BC     STG 1 Pt. famaliar with precautions skin care and self management of lymphedema.  -BC     STG 2 Pt. HEP to assist with improved lymphatic flow  -BC     STG 3 Self care instructions for home MLD for volume reduction.  -BC     STG 4 Reduction of volume by 5% to reduce risk of infection.  -BC     Long Term Goals    LTG Date to Achieve 04/09/18  -BC     LTG 1 Pt. and/or caregiver independent with short stretch compression bandaging for volume reduction.  -BC     LTG 2 Independent with donning/doffing compression garment.  -BC     LTG 3 Independent with lymphedema management and HEP  -BC     Time Calculation    OT Goal Re-Cert Due Date 06/09/18  -BC       User Key  (r) = Recorded By, (t) = Taken By, (c) = Cosigned By    Initials Name Provider Type    [facility], OT Occupational Therapist               Outcome Measure Options: AM-PAC 6 Clicks Daily Activity (OT)  How much help from another is currently needed...  Putting on and taking off regular lower body clothing?: a little  Bathing (including washing, rinsing, and drying): a little  Toileting (which includes using toilet bed pan or urinal): none  Putting on and taking off regular upper body clothing: none  Taking care of personal grooming (such as brushing teeth): none  Eating meals: none  Score: 22           Time Calculation:   OT Start Time: 1000  OT Stop Time: 1130  OT Time Calculation (min): 90 min       Therapy Charges for Today     Code Description Service Date Service Provider Modifiers Qty    82121795733 HC LYMPHEDEMA MANAGEMENT-15 MIN 3/9/2018 [facility], OT  1    [id number] HC OT MANUAL THERAPY EA 15 MIN 3/9/2018 [facility], OT GO 4    [id number] HC OT EVAL LOW COMPLEXITY 1 3/9/2018 [facility], OT GO 1                       Vera Bowman, OT  3/9/2018

## 2018-03-21 ENCOUNTER — HOSPITAL ENCOUNTER (OUTPATIENT)
Dept: OCCUPATIONAL THERAPY | Facility: HOSPITAL | Age: 53
Setting detail: THERAPIES SERIES
Discharge: HOME | End: 2018-03-21

## 2018-03-21 DIAGNOSIS — R60.0 BILATERAL LOWER EXTREMITY EDEMA: Primary | ICD-10-CM

## 2018-03-21 PROCEDURE — 97139 UNLISTED THERAPEUTIC PX: CPT

## 2018-03-21 PROCEDURE — 97140 MANUAL THERAPY 1/> REGIONS: CPT

## 2018-03-21 NOTE — THERAPY TREATMENT NOTE
Outpatient Occupational Therapy Lymphedema Treatment Note   [place]     Patient Name: El Nielsen  : 1965  MRN: [id number]  Today's Date: 3/21/2018      Visit Date: 2018    There is no problem list on file for this patient.       Past Medical History:   Diagnosis Date   • Arthritis    • Hypertension         Past Surgical History:   Procedure Laterality Date   • EYE SURGERY     • VASCULAR SURGERY           Visit Dx:      ICD-10-CM ICD-9-CM   1. Bilateral lower extremity edema R60.0 782.3             Lymphedema     Row Name 18 1700             Subjective Pain    Able to rate subjective pain? yes  -BC      Pre-Treatment Pain Level 5  -BC      Subjective Pain Comment My legs hurt really bad last night, I tried to do too much yesterday.  I wasnt able to sleep.  -BC         Lymphedema Edema Assessment    Ptting Edema Category By grade out of 4  -BC      Pitting Edema + 4/4  -BC         Skin Changes/Observations    Location/Assessment Lower Extremity  -BC      Lower Extremity Conditions bilateral:;intact;clean;dry;shiny;scaly;inflamed;other (comment)  -BC      Lower Extremity Color/Pigment bilateral:;red;non-blanchable;hyperpigmented;brawny;P'eau d'orange  -BC         Lymphedema Measurements    Measurement Type(s) Quick Girth;Circumferential  -BC      Quick Girth Areas Lower extremities  -BC      Circumferential Areas Lower extremities  -BC         LLE Quick Girth (cm)    Met-heads 31 cm  -BC      Mid foot 30.5 cm  -BC      Smallest ankle 32.4 cm  -BC      Mid patella 46.7 cm  -BC      Distal thigh 56.4 cm  -BC      Proximal thigh 63.5 cm  -BC         RLE Quick Girth (cm)    Met-heads 303 cm  -BC      Mid foot 32.2 cm  -BC      Smallest ankle 35.4 cm  -BC      Mid patella 44.5 cm  -BC      Distal thigh 56.8 cm  -BC      Proximal thigh 64.6 cm  -BC      RLE Quick Girth Total 536.5  -BC         LLE Circumferential (cm)    Left 4 34.2 cm  -BC      Left 5 42.6 cm  -BC      Left 6 50.5 cm  -BC          RLE Circumferential (cm)    Right 4 36.8 cm  -BC      Right 5 45.2 cm  -BC      Right 6 52 cm  -BC         Manual Lymphatic Drainage    Manual Lymphatic Drainage extremity treatment  -BC      Opened Regional Lymph Nodes inguinal;ribs  -BC      Inguinal right;left  -BC      Extremity Treatment MLD to full limb;extremity treatment focus on  -BC         Compression/Skin Care    Compression/Skin Care skin care;bandaging;remove bandages  -BC      Skin Care moisturizing lotion applied  -BC      Bandaging Technique circumferential/spiral  -BC      Compression/Skin Care Comments B LE compression pump x 30 min.  -BC        User Key  (r) = Recorded By, (t) = Taken By, (c) = Cosigned By    Initials Name Provider Type    [facility], OT Occupational Therapist                                                       Time Calculation:   OT Start Time: 1500  OT Stop Time: 1638  OT Time Calculation (min): 98 min       Therapy Charges for Today     Code Description Service Date Service Provider Modifiers Qty    63095264897 HC OT MANUAL THERAPY EA 15 MIN 3/21/2018 [facility], OT GO 4    [id number] HC LYMPHEDEMA MANAGEMENT-15 MIN 3/21/2018 [facility], OT  2                      Vera Groton, OT  3/21/2018

## 2018-03-26 ENCOUNTER — HOSPITAL ENCOUNTER (OUTPATIENT)
Dept: OCCUPATIONAL THERAPY | Facility: HOSPITAL | Age: 53
Setting detail: THERAPIES SERIES
Discharge: HOME | End: 2018-03-26

## 2018-03-26 DIAGNOSIS — R60.0 BILATERAL LOWER EXTREMITY EDEMA: Primary | ICD-10-CM

## 2018-03-26 PROCEDURE — 97140 MANUAL THERAPY 1/> REGIONS: CPT

## 2018-03-26 PROCEDURE — 97139 UNLISTED THERAPEUTIC PX: CPT

## 2018-03-26 NOTE — THERAPY TREATMENT NOTE
Outpatient Occupational Therapy Lymphedema Treatment Note   [place]     Patient Name: El Nielsen  : 1965  MRN: [id number]  Today's Date: 3/26/2018      Visit Date: 2018    There is no problem list on file for this patient.       Past Medical History:   Diagnosis Date   • Arthritis    • Hypertension         Past Surgical History:   Procedure Laterality Date   • EYE SURGERY     • VASCULAR SURGERY           Visit Dx:      ICD-10-CM ICD-9-CM   1. Bilateral lower extremity edema R60.0 782.3             Lymphedema     Row Name 18 0800             Subjective Pain    Able to rate subjective pain? yes  -BC      Pre-Treatment Pain Level 2  -BC         Lymphedema Edema Assessment    Ptting Edema Category By grade out of 4  -BC      Pitting Edema + 1/4 (+1 of 4)  -BC         Skin Changes/Observations    Location/Assessment Lower Extremity  -BC      Lower Extremity Conditions bilateral:;intact;clean;dry;shiny;scaly;inflamed;other (comment)  -BC      Lower Extremity Color/Pigment bilateral:;red;non-blanchable;hyperpigmented;brawny;P'eau d'orange  -BC         Lymphedema Measurements    Measurement Type(s) Quick Girth;Circumferential  -BC      Quick Girth Areas Lower extremities  -BC      Circumferential Areas Lower extremities  -BC         LLE Quick Girth (cm)    Met-heads 31.9 cm  -BC      Mid foot 31 cm  -BC      Smallest ankle 32.8 cm  -BC      Mid patella 45.9 cm  -BC      Distal thigh 57.9 cm  -BC      Proximal thigh 68 cm  -BC         RLE Quick Girth (cm)    Met-heads 30.4 cm  -BC      Mid foot 31.8 cm  -BC      Smallest ankle 34 cm  -BC      Mid patella 45 cm  -BC      Distal thigh 56.8 cm  -BC      Proximal thigh 64 cm  -BC      RLE Quick Girth Total 262  -BC         LLE Circumferential (cm)    Left 4 34.4 cm  -BC      Left 5 45.8 cm  -BC      Left 6 49.9 cm  -BC         RLE Circumferential (cm)    Right 4 35.5 cm  -BC      Right 5 45 cm  -BC      Right 6 51.8 cm  -BC         Manual Lymphatic  Drainage    Manual Lymphatic Drainage extremity treatment  -BC      Opened Regional Lymph Nodes inguinal;ribs  -BC      Inguinal right;left  -BC      Extremity Treatment MLD to full limb  -BC         Compression/Skin Care    Compression/Skin Care skin care;bandaging  -BC      Skin Care moisturizing lotion applied  -BC      Bandage Layers short-stretch bandages (comment size/quantity);cotton elastic stocking- single layer (comment size);padding/fluff layer  -BC      Bandaging Technique circumferential/spiral  -BC      Compression/Skin Care Comments B LE compression pump x 30 min.  -BC        User Key  (r) = Recorded By, (t) = Taken By, (c) = Cosigned By    Initials Name Provider Type    [facility], OT Occupational Therapist                                                       Time Calculation:   OT Start Time: 0905  OT Stop Time: 1030  OT Time Calculation (min): 85 min       Therapy Charges for Today     Code Description Service Date Service Provider Modifiers Qty    44884825838 HC OT MANUAL THERAPY EA 15 MIN 3/26/2018 [facility], OT GO 4    98604073897 HC LYMPHEDEMA MANAGEMENT-15 MIN 3/26/2018 [facility], OT  2                      [facility], OT  3/26/2018

## 2018-03-30 ENCOUNTER — APPOINTMENT (OUTPATIENT)
Dept: OCCUPATIONAL THERAPY | Facility: HOSPITAL | Age: 53
End: 2018-03-30

## 2018-04-11 ENCOUNTER — HOSPITAL ENCOUNTER (OUTPATIENT)
Dept: OCCUPATIONAL THERAPY | Facility: HOSPITAL | Age: 53
Setting detail: THERAPIES SERIES
Discharge: HOME | End: 2018-04-11

## 2018-04-11 DIAGNOSIS — R60.0 BILATERAL LOWER EXTREMITY EDEMA: Primary | ICD-10-CM

## 2018-04-11 PROCEDURE — 97140 MANUAL THERAPY 1/> REGIONS: CPT

## 2018-04-11 PROCEDURE — 97139 UNLISTED THERAPEUTIC PX: CPT

## 2018-04-11 NOTE — THERAPY TREATMENT NOTE
Outpatient Occupational Therapy Lymphedema Treatment Note   Gavin     Patient Name: El Nielsen  : 1965  MRN: [id number]  Today's Date: 2018      Visit Date: 2018    There is no problem list on file for this patient.       Past Medical History:   Diagnosis Date   • Arthritis    • Hypertension         Past Surgical History:   Procedure Laterality Date   • EYE SURGERY     • VASCULAR SURGERY           Visit Dx:      ICD-10-CM ICD-9-CM   1. Bilateral lower extremity edema R60.0 782.3             Lymphedema     Row Name 18 1400             Subjective Pain    Able to rate subjective pain? yes  -BC      Pre-Treatment Pain Level 2  -BC         Lymphedema Assessment    Lymphedema Classification stage 1 (Spontaneously Reversible)  -BC         Lymphedema Edema Assessment    Ptting Edema Category By grade out of 4  -BC      Pitting Edema + 2/4  -BC         Skin Changes/Observations    Location/Assessment Lower Extremity  -BC      Lower Extremity Conditions bilateral:;intact;clean;dry;shiny;scaly;inflamed;other (comment)  -BC      Lower Extremity Color/Pigment bilateral:;red;non-blanchable;hyperpigmented;brawny;P'eau d'orange  -BC         Lymphedema Measurements    Measurement Type(s) Quick Girth;Circumferential  -BC      Quick Girth Areas Lower extremities  -BC      Circumferential Areas Lower extremities  -BC         LLE Quick Girth (cm)    Met-heads 30.8 cm  -BC      Mid foot 31 cm  -BC      Smallest ankle 32.8 cm  -BC      Mid patella 45 cm  -BC      Distal thigh 50 cm  -BC      Proximal thigh 58 cm  -BC         RLE Quick Girth (cm)    Met-heads 28.9 cm  -BC      Mid foot 34.3 cm  -BC      Smallest ankle 38.3 cm  -BC      Mid patella 45.3 cm  -BC      Distal thigh 57.8 cm  -BC      Proximal thigh 65 cm  -BC      RLE Quick Girth Total 269.6  -BC         Manual Lymphatic Drainage    Manual Lymphatic Drainage extremity treatment  -BC      Opened Regional Lymph Nodes inguinal;ribs  -BC       Inguinal right;left  -BC      Extremity Treatment MLD to full limb  -BC         Compression/Skin Care    Compression/Skin Care skin care;bandaging  -BC      Skin Care moisturizing lotion applied  -BC      Bandage Layers short-stretch bandages (comment size/quantity);cotton elastic stocking- single layer (comment size);padding/fluff layer  -BC      Bandaging Technique circumferential/spiral  -BC      Compression/Skin Care Comments compression pump x 2 x 30 min.  -BC        User Key  (r) = Recorded By, (t) = Taken By, (c) = Cosigned By    Initials Name Provider Type    BC Vera Bowman, OT Occupational Therapist                                                       Time Calculation:   OT Start Time: 0730  OT Stop Time: 0900  OT Time Calculation (min): 90 min       Therapy Charges for Today     Code Description Service Date Service Provider Modifiers Qty    13889223011 HC OT MANUAL THERAPY EA 15 MIN 4/11/2018 Vera Bowman, OT GO 4    11983656925 HC LYMPHEDEMA MANAGEMENT-15 MIN 4/11/2018 Vera Bowman, OT  2                      Vera Bowman OT  4/11/2018

## 2018-04-16 ENCOUNTER — APPOINTMENT (OUTPATIENT)
Dept: OCCUPATIONAL THERAPY | Facility: HOSPITAL | Age: 53
End: 2018-04-16

## 2018-04-18 ENCOUNTER — HOSPITAL ENCOUNTER (OUTPATIENT)
Dept: OCCUPATIONAL THERAPY | Facility: HOSPITAL | Age: 53
Setting detail: THERAPIES SERIES
Discharge: HOME | End: 2018-04-18

## 2018-04-18 DIAGNOSIS — R60.0 BILATERAL LOWER EXTREMITY EDEMA: Primary | ICD-10-CM

## 2018-04-18 PROCEDURE — 97535 SELF CARE MNGMENT TRAINING: CPT

## 2018-04-18 PROCEDURE — 97140 MANUAL THERAPY 1/> REGIONS: CPT

## 2018-04-18 NOTE — THERAPY TREATMENT NOTE
Outpatient Occupational Therapy Lymphedema Treatment Note   Gavin     Patient Name: El Nielsen  : 1965  MRN: [id number]  Today's Date: 2018      Visit Date: 2018    There is no problem list on file for this patient.       Past Medical History:   Diagnosis Date   • Arthritis    • Hypertension         Past Surgical History:   Procedure Laterality Date   • EYE SURGERY     • VASCULAR SURGERY           Visit Dx:      ICD-10-CM ICD-9-CM   1. Bilateral lower extremity edema R60.0 782.3             Lymphedema     Row Name 18 0700             Subjective Pain    Able to rate subjective pain? yes  -BC      Pre-Treatment Pain Level 3  -BC         Subjective Comments    Subjective Comments I can feel it but its not bad.  -BC         Lymphedema Edema Assessment    Ptting Edema Category By grade out of 4  -BC      Pitting Edema + 4/4  -BC         Skin Changes/Observations    Location/Assessment Lower Extremity  -BC      Lower Extremity Conditions bilateral:;intact;clean;dry;shiny;scaly;inflamed;other (comment)  -BC      Lower Extremity Color/Pigment bilateral:;red;non-blanchable;hyperpigmented;brawny;P'eau d'orange  -BC         Lymphedema Measurements    Measurement Type(s) Quick Girth;Circumferential  -BC      Quick Girth Areas Lower extremities  -BC      Circumferential Areas Lower extremities  -BC         LLE Quick Girth (cm)    Met-heads 32 cm  -BC      Mid foot 30.2 cm  -BC      Smallest ankle 32.4 cm  -BC      Mid patella 45.4 cm  -BC      Distal thigh 57 cm  -BC      Proximal thigh 65.6 cm  -BC         RLE Quick Girth (cm)    Met-heads 31.1 cm  -BC      Mid foot 32.5 cm  -BC      Smallest ankle 36.2 cm  -BC      Mid patella 45 cm  -BC      Distal thigh 57.8 cm  -BC      Proximal thigh 61.6 cm  -BC      RLE Quick Girth Total 264.2  -BC         LLE Circumferential (cm)    Left 4 34 cm  -BC      Left 5 42.8 cm  -BC      Left 6 49.6 cm  -BC         RLE Circumferential (cm)    Right 4 38 cm   -BC      Right 5 48.3 cm  -BC      Right 6 52.5 cm  -BC         Manual Lymphatic Drainage    Manual Lymphatic Drainage extremity treatment  -BC      Opened Regional Lymph Nodes inguinal;ribs  -BC      Inguinal right;left  -BC      Extremity Treatment MLD to full limb  -BC         Compression/Skin Care    Compression/Skin Care skin care;compression garment  -BC      Skin Care moisturizing lotion applied  -BC      Compression Garment Comments Pt. with compression stockings open toed, knee length with compression of 20-30.   -BC      Compression/Skin Care Comments Pt. request stocking rather than bandaging so that he could wrap at night.  -BC        User Key  (r) = Recorded By, (t) = Taken By, (c) = Cosigned By    Initials Name Provider Type    [facility], OT Occupational Therapist                                                       Time Calculation:   OT Start Time: 0745  OT Stop Time: 0915  OT Time Calculation (min): 90 min  OT Non-Billable Time (min): 20 min       Therapy Charges for Today     Code Description Service Date Service Provider Modifiers Qty    41847476523 HC OT MANUAL THERAPY EA 15 MIN 4/18/2018 [facility], OT GO 4    10894002464 HC OT SELF CARE/MGMT/TRAIN EA 15 MIN 4/18/2018 [facility], OT GO 2                      [facility], OT  4/18/2018

## 2018-05-01 ENCOUNTER — HOSPITAL ENCOUNTER (OUTPATIENT)
Dept: OCCUPATIONAL THERAPY | Facility: HOSPITAL | Age: 53
Setting detail: THERAPIES SERIES
Discharge: HOME | End: 2018-05-01

## 2018-05-01 DIAGNOSIS — R60.0 BILATERAL LOWER EXTREMITY EDEMA: Primary | ICD-10-CM

## 2018-05-01 PROCEDURE — 97139 UNLISTED THERAPEUTIC PX: CPT

## 2018-05-01 PROCEDURE — 97140 MANUAL THERAPY 1/> REGIONS: CPT

## 2018-05-01 NOTE — THERAPY TREATMENT NOTE
Outpatient Occupational Therapy Lymphedema Treatment Note   Gavin     Patient Name: El Nielsen  : 1965  MRN: [id number]  Today's Date: 2018      Visit Date: 2018    There is no problem list on file for this patient.       Past Medical History:   Diagnosis Date   • Arthritis    • Hypertension         Past Surgical History:   Procedure Laterality Date   • EYE SURGERY     • VASCULAR SURGERY           Visit Dx:      ICD-10-CM ICD-9-CM   1. Bilateral lower extremity edema R60.0 782.3             Lymphedema     Row Name 18 0900             Subjective Pain    Able to rate subjective pain? yes  -BC      Pre-Treatment Pain Level 0  -BC         Subjective Comments    Subjective Comments I think its swelled up.   -BC         Lymphedema Assessment    Lymphedema Classification stage 1 (Spontaneously Reversible)  -BC         Lymphedema Edema Assessment    Ptting Edema Category By grade out of 4  -BC      Pitting Edema + 4/4  -BC         Skin Changes/Observations    Location/Assessment Lower Extremity  -BC      Lower Extremity Conditions bilateral:;intact;clean;dry;shiny;scaly;inflamed;other (comment)  -BC      Lower Extremity Color/Pigment bilateral:;red;non-blanchable;hyperpigmented;brawny;P'eau d'orange  -BC         Lymphedema Measurements    Measurement Type(s) Quick Girth;Circumferential  -BC      Quick Girth Areas Lower extremities  -BC      Circumferential Areas Lower extremities  -BC         LLE Quick Girth (cm)    Met-heads 30.6 cm  -BC      Mid foot 31.4 cm  -BC      Smallest ankle 33.3 cm  -BC      Mid patella 47 cm  -BC      Distal thigh 58.1 cm  -BC      Proximal thigh 66 cm  -BC         RLE Quick Girth (cm)    Met-heads 30.6 cm  -BC      Mid foot 33.3 cm  -BC      Smallest ankle 37 cm  -BC      Mid patella 46.6 cm  -BC      Distal thigh 58.2 cm  -BC      Proximal thigh 65.1 cm  -BC      RLE Quick Girth Total 270.8  -BC         LLE Circumferential (cm)    Left 4 35.5 cm  -BC       Left 5 42.8 cm  -BC      Left 6 51 cm  -BC         RLE Circumferential (cm)    Right 4 40 cm  -BC      Right 5 48.5 cm  -BC      Right 6 51.8 cm  -BC        User Key  (r) = Recorded By, (t) = Taken By, (c) = Cosigned By    Initials Name Provider Type    BC [facility], OT Occupational Therapist                                                       Time Calculation:   OT Start Time: 0900  OT Stop Time: 1030  OT Time Calculation (min): 90 min  OT Non-Billable Time (min): 20 min       Therapy Charges for Today     Code Description Service Date Service Provider Modifiers Qty    72063731077 HC OT MANUAL THERAPY EA 15 MIN 5/1/2018 [facility], OT GO 4    [id number] HC OT LYMPHEDEMA MANAGEMENT-15 MIN 5/1/2018 [facility], OT  2                      [facility], OT  5/1/2018

## 2018-05-03 ENCOUNTER — HOSPITAL ENCOUNTER (OUTPATIENT)
Dept: OCCUPATIONAL THERAPY | Facility: HOSPITAL | Age: 53
Setting detail: THERAPIES SERIES
Discharge: HOME | End: 2018-05-03

## 2018-05-03 DIAGNOSIS — R60.0 BILATERAL LOWER EXTREMITY EDEMA: Primary | ICD-10-CM

## 2018-05-03 PROCEDURE — 97139 UNLISTED THERAPEUTIC PX: CPT

## 2018-05-03 PROCEDURE — 97140 MANUAL THERAPY 1/> REGIONS: CPT

## 2018-05-03 NOTE — THERAPY TREATMENT NOTE
Outpatient Occupational Therapy Lymphedema Treatment Note   Gavin     Patient Name: El Nielsen  : 1965  MRN: [id number]  Today's Date: 5/3/2018      Visit Date: 2018    There is no problem list on file for this patient.       Past Medical History:   Diagnosis Date   • Arthritis    • Hypertension         Past Surgical History:   Procedure Laterality Date   • EYE SURGERY     • VASCULAR SURGERY           Visit Dx:      ICD-10-CM ICD-9-CM   1. Bilateral lower extremity edema R60.0 782.3             Lymphedema     Row Name 18 1000             Subjective Pain    Able to rate subjective pain? yes  -BC      Pre-Treatment Pain Level 3  -BC         Subjective Comments    Subjective Comments I tried to work yesterday and it feels like its swelling more.  -BC         Lymphedema Assessment    Lymphedema Classification stage 1 (Spontaneously Reversible)  -BC         Lymphedema Edema Assessment    Ptting Edema Category By grade out of 4  -BC      Pitting Edema + 4/4  -BC         Skin Changes/Observations    Location/Assessment Lower Extremity  -BC      Lower Extremity Conditions bilateral:;intact;clean;dry;shiny;crust;scaly;hairless  -BC      Lower Extremity Color/Pigment bilateral:;red;blanchable;hyperpigmented;fibrosis  -BC         Lymphedema Measurements    Measurement Type(s) Quick Girth;Circumferential  -BC      Quick Girth Areas Lower extremities  -BC      Circumferential Areas Lower extremities  -BC         LLE Quick Girth (cm)    Met-heads 31.2 cm  -BC      Mid foot 31 cm  -BC      Smallest ankle 32.5 cm  -BC      Mid patella 48.4 cm  -BC      Distal thigh 58.3 cm  -BC      Proximal thigh 66.7 cm  -BC         RLE Quick Girth (cm)    Met-heads 30.5 cm  -BC      Mid foot 32.2 cm  -BC      Smallest ankle 36.8 cm  -BC      Mid patella 47.5 cm  -BC      Distal thigh 57.5 cm  -BC      Proximal thigh 65.2 cm  -BC      RLE Quick Girth Total 269.7  -BC         LLE Circumferential (cm)    Left 4 33.5  cm  -BC      Left 5 41.8 cm  -BC      Left 6 49.8 cm  -BC         RLE Circumferential (cm)    Right 4 39 cm  -BC      Right 5 48 cm  -BC      Right 6 52 cm  -BC         Manual Lymphatic Drainage    Manual Lymphatic Drainage extremity treatment  -BC      Opened Regional Lymph Nodes inguinal;ribs  -BC      Inguinal right;left  -BC      Extremity Treatment MLD to full limb  -BC         Compression/Skin Care    Compression/Skin Care skin care;compression garment  -BC      Skin Care moisturizing lotion applied  -BC      Compression Garment Comments Donned B LE compression stockings.  -BC      Compression/Skin Care Comments B LE pump x 30 min.  -BC        User Key  (r) = Recorded By, (t) = Taken By, (c) = Cosigned By    Initials Name Provider Type    [facility], OT Occupational Therapist                        OT Assessment/Plan     Row Name 05/03/18 1224          OT Assessment    Assessment Comments Pt. reports increased swelling with outside work, pt. states that he maintained bandaging and that his thighs felt swollen.  Pt. requests donning B LE comression stockings.  -BC       User Key  (r) = Recorded By, (t) = Taken By, (c) = Cosigned By    Initials Name Provider Type    [facility], OT Occupational Therapist                                           Time Calculation:   OT Start Time: 1030  OT Stop Time: 1200  OT Time Calculation (min): 90 min  OT Non-Billable Time (min): 20 min       Therapy Charges for Today     Code Description Service Date Service Provider Modifiers Qty    [id number]  OT MANUAL THERAPY EA 15 MIN 5/3/2018 [facility], OT GO 4    [id number]  OT LYMPHEDEMA MANAGEMENT-15 MIN 5/3/2018 [facility], OT  2                      [facility], OT  5/3/2018

## 2018-06-01 ENCOUNTER — APPOINTMENT (OUTPATIENT)
Dept: OCCUPATIONAL THERAPY | Facility: HOSPITAL | Age: 53
End: 2018-06-01

## 2018-09-11 ENCOUNTER — HOSPITAL ENCOUNTER (OUTPATIENT)
Dept: OCCUPATIONAL THERAPY | Facility: HOSPITAL | Age: 53
Setting detail: THERAPIES SERIES
Discharge: HOME | End: 2018-09-11

## 2018-09-11 ENCOUNTER — TRANSCRIBE ORDERS (OUTPATIENT)
Dept: OCCUPATIONAL THERAPY | Facility: HOSPITAL | Age: 53
End: 2018-09-11

## 2018-09-11 DIAGNOSIS — I89.0 LYMPHEDEMA: Primary | ICD-10-CM

## 2018-09-11 DIAGNOSIS — R60.0 BILATERAL LOWER EXTREMITY EDEMA: Primary | ICD-10-CM

## 2018-09-11 PROCEDURE — 97139 UNLISTED THERAPEUTIC PX: CPT

## 2018-09-11 PROCEDURE — 97167 OT EVAL HIGH COMPLEX 60 MIN: CPT

## 2018-09-11 PROCEDURE — 97140 MANUAL THERAPY 1/> REGIONS: CPT

## 2018-09-11 NOTE — THERAPY EVALUATION
Outpatient Occupational Therapy Lymphedema Initial Evaluation   [facility]     Patient Name: El Nielsen  : 1965  MRN: [id number]  Today's Date: 2018      Visit Date: 2018    There is no problem list on file for this patient.       Past Medical History:   Diagnosis Date   • Arthritis    • Hypertension         Past Surgical History:   Procedure Laterality Date   • EYE SURGERY     • VASCULAR SURGERY           Visit Dx:     ICD-10-CM ICD-9-CM   1. Bilateral lower extremity edema R60.0 782.3                 Lymphedema     Row Name 18 1300             Subjective Pain    Able to rate subjective pain? yes  -BC      Pre-Treatment Pain Level 6  -BC      Subjective Pain Comment I''m getting use to it , is the sad part.  -BC         Subjective Comments    Subjective Comments Pt. retruned for lymphedema management of BLE.  -BC         Lymphedema Assessment    Lymphedema Classification RLE:;LLE:;primary;stage 2 (Spontaneously Irreversible)  -BC      Lymphedema Assessment Comments Pt. skin feels hard to the touch.  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  -BC         Lymphedema Sensation    Lymphedema Sensation Reports RLE:;LLE:;numbness;tingling  -BC      Lymphedema Sensation Tests light touch;deep touch;temperature  -BC      Lymphedema Light Touch RLE:;LLE:;mild impairment  -BC      Lymphedema Deep Touch RLE:;LLE:;mild impairment;moderate impairment  -BC      Lymphedema Temperature mild impairment  -BC         Lymphedema Measurements     Measurement Type(s) Quick Girth  -BC      Quick Girth Areas Lower extremities  -BC         LLE Quick Girth (cm)    Met-heads 31 cm  -BC      Mid foot 34.5 cm  -BC      Smallest ankle 34.7 cm  -BC      Largest calf 53 cm  -BC      Tib tuberosity 47.8 cm  -BC      Mid patella 56.3 cm  -BC      Distal thigh 62.6 cm  -BC      Proximal thigh 69.5 cm  -BC      Other 1 38.9 cm  -BC      Other 2 47.8 cm  -BC         RLE Quick Girth (cm)    Met-heads 33.1 cm  -BC      Mid foot 34.6 cm  -BC      Smallest ankle 38 cm  -BC      Largest calf 57.7 cm  -BC      Tib tuberosity 47.4 cm  -BC      Mid patella 52.8 cm  -BC      Distal thigh 58.4 cm  -BC      Proximal thigh 67 cm  -BC      Other 1 41.1 cm  -BC      Other 2 50.1 cm  -BC      RLE Quick Girth Total 480.2  -BC        User Key  (r) = Recorded By, (t) = Taken By, (c) = Cosigned By    Initials Name Provider Type    Vera Mitchell OT Occupational Therapist                                     OT Goals     Row Name 09/11/18 1400          OT Short Term Goals    STG Date to Achieve 10/11/18  -BC     STG 1 Pt. famaliar with precautions skin care and self management of lymphedema.  -BC     STG 2 Pt. HEP to assist with improved lymphatic flow  -BC     STG 3 Self care instructions for home MLD for volume reduction.  -BC     STG 4 Reduction of volume by 5% to reduce risk of infection.  -BC        Long Term Goals    LTG Date to Achieve 11/11/18  -BC     LTG 1 Pt. and/or caregiver independent with short stretch compression bandaging for volume reduction.  -BC     LTG 2 Independent with donning/doffing compression garment.  -BC     LTG 3 Independent with lymphedema management and HEP  -BC        Time Calculation    OT Goal Re-Cert Due Date 12/11/18  -BC       User Key  (r) = Recorded By, (t) = Taken By, (c) = Cosigned By    Initials Name Provider Type    Vera Mitchell OT Occupational Therapist                OT Assessment/Plan     Row Name 09/11/18 1435          OT Assessment     Functional Limitations Performance in work activities;Limitation in home management;Performance in self-care ADL  -BC     Impairments Balance;Edema;Endurance;Coordination;Pain;Joint mobility;Impaired lymphatic circulation  -BC     Assessment Comments Pt. evaluated for exacerbation of symptoms of lymphedema, pt. reports increased pain and increased swelling with BLE.  Pt. skin discolored, hyperpigmented.   -BC     Please refer to paper survey for additional self-reported information Yes  -BC     OT Diagnosis Lymphedema  -BC     OT Rehab Potential Good  -BC     Patient/caregiver participated in establishment of treatment plan and goals Yes  -BC     Patient would benefit from skilled therapy intervention Yes  -BC        OT Plan    OT Frequency 2x/week  -BC     Predicted Duration of Therapy Intervention (Therapy Eval) 12 week  -BC     Planned CPT's? OT EVAL HIGH COMPLEXITY: 53680;OT MANUAL THERAPY EA 15 MIN: 41969;OT THER ACT EA 15 MIN: 25992XC;OT VASOPNEUMATIC DEVICE: 76906;OT SELF CARE/MGMT/TRAIN 15 MIN: 81721   Lymphedema management  -BC     Planned Therapy Interventions (Optional Details) patient/family education;home exercise program;stretching;manual therapy techniques  -BC     OT Plan Comments Pt. would benefit from skilled OT to address lymphedema symptoms.   -BC       User Key  (r) = Recorded By, (t) = Taken By, (c) = Cosigned By    Initials Name Provider Type    Vera Mitchell OT Occupational Therapist          Outcome Measure Options: AM-PAC 6 Clicks Daily Activity (OT)  How much help from another is currently needed...  Putting on and taking off regular lower body clothing?: a little  Bathing (including washing, rinsing, and drying): none  Toileting (which includes using toilet bed pan or urinal): none  Putting on and taking off regular upper body clothing: none  Taking care of personal grooming (such as brushing teeth): none  Eating meals: none  Score: 23         Time Calculation:   OT Start Time:  1330  OT Stop Time: 1523  OT Time Calculation (min): 113 min  OT Non-Billable Time (min): 20 min     Therapy Suggested Charges     Code   Minutes Charges    None             Therapy Charges for Today     Code Description Service Date Service Provider Modifiers Qty    81247495138 HC OT LYMPHEDEMA MANAGEMENT-15 MIN 2018 Vera Bowman, OT  1    20534472989 HC OT MANUAL THERAPY EA 15 MIN 2018 Vera Bowman, OT GO 1    60270811871 HC OT EVAL HIGH COMPLEXITY 3 2018 Vera Bowman, OT GO 1                    Vera Bowman, OT  2018 and Outpatient Occupational Therapy Lymphedema Treatment Note  CASSANDRA Gavin     Patient Name: El Nielsen  : 1965  MRN: [id number]  Today's Date: 2018      Visit Date: 2018    There is no problem list on file for this patient.       Past Medical History:   Diagnosis Date   • Arthritis    • Hypertension         Past Surgical History:   Procedure Laterality Date   • EYE SURGERY     • VASCULAR SURGERY           Visit Dx:      ICD-10-CM ICD-9-CM   1. Bilateral lower extremity edema R60.0 782.3             Lymphedema     Row Name 18 1300             Subjective Pain    Able to rate subjective pain? yes  -BC      Pre-Treatment Pain Level 6  -BC      Subjective Pain Comment I''m getting use to it , is the sad part.  -BC         Subjective Comments    Subjective Comments Pt. retruned for lymphedema management of BLE.  -BC         Lymphedema Assessment    Lymphedema Classification RLE:;LLE:;primary;stage 2 (Spontaneously Irreversible)  -BC      Lymphedema Assessment Comments Pt. skin feels hard to the touch.  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  -BC         Lymphedema Sensation    Lymphedema Sensation Reports RLE:;LLE:;numbness;tingling  -BC      Lymphedema Sensation Tests light touch;deep touch;temperature  -BC      Lymphedema Light Touch RLE:;LLE:;mild impairment  -BC      Lymphedema Deep Touch RLE:;LLE:;mild impairment;moderate impairment  -BC      Lymphedema Temperature mild impairment  -BC         Lymphedema Measurements    Measurement Type(s) Quick Girth  -BC      Quick Girth Areas Lower extremities  -BC         LLE Quick Girth (cm)    Met-heads 31 cm  -BC      Mid foot 34.5 cm  -BC      Smallest ankle 34.7 cm  -BC      Largest calf 53 cm  -BC      Tib tuberosity 47.8 cm  -BC      Mid patella 56.3 cm  -BC      Distal thigh 62.6 cm  -BC      Proximal thigh 69.5 cm  -BC      Other 1 38.9 cm  -BC      Other 2 47.8 cm  -BC         RLE Quick Girth (cm)    Met-heads 33.1 cm  -BC      Mid foot 34.6 cm  -BC      Smallest ankle 38 cm  -BC      Largest calf 57.7 cm  -BC      Tib tuberosity 47.4 cm  -BC      Mid patella 52.8 cm  -BC      Distal thigh 58.4 cm  -BC      Proximal thigh 67 cm  -BC      Other 1 41.1 cm  -BC      Other 2 50.1 cm  -BC      RLE Quick Girth Total 480.2  -BC        User Key  (r) = Recorded By, (t) = Taken By, (c) = Cosigned By    Initials Name Provider Type    BC Vera Bowman OT Occupational Therapist                        OT Assessment/Plan     Row Name 09/11/18 1842          OT Assessment    Functional Limitations Performance in work activities;Limitation in home management;Performance in self-care ADL  -BC     Impairments Balance;Edema;Endurance;Coordination;Pain;Joint mobility;Impaired lymphatic circulation  -BC     Assessment Comments Pt. evaluated for exacerbation of symptoms of lymphedema, pt. reports increased pain and increased swelling with BLE.  Pt. skin discolored,  hyperpigmented.   -BC     Please refer to paper survey for additional self-reported information Yes  -BC     OT Diagnosis Lymphedema  -BC     OT Rehab Potential Good  -BC     Patient/caregiver participated in establishment of treatment plan and goals Yes  -BC     Patient would benefit from skilled therapy intervention Yes  -BC        OT Plan    OT Frequency 2x/week  -BC     Predicted Duration of Therapy Intervention (Therapy Eval) 12 week  -BC     Planned CPT's? OT EVAL HIGH COMPLEXITY: 10183;OT MANUAL THERAPY EA 15 MIN: 77047;OT THER ACT EA 15 MIN: [id number];OT VASOPNEUMATIC DEVICE: 40325;OT SELF CARE/MGMT/TRAIN 15 MIN: 76873   Lymphedema management  -BC     Planned Therapy Interventions (Optional Details) patient/family education;home exercise program;stretching;manual therapy techniques  -BC     OT Plan Comments Pt. would benefit from skilled OT to address lymphedema symptoms.   -BC       User Key  (r) = Recorded By, (t) = Taken By, (c) = Cosigned By    Initials Name Provider Type    BC Vera Bowman OT Occupational Therapist                            OT Goals     Row Name 09/11/18 1400          OT Short Term Goals    STG Date to Achieve 10/11/18  -BC     STG 1 Pt. famaliar with precautions skin care and self management of lymphedema.  -BC     STG 2 Pt. HEP to assist with improved lymphatic flow  -BC     STG 3 Self care instructions for home MLD for volume reduction.  -BC     STG 4 Reduction of volume by 5% to reduce risk of infection.  -BC        Long Term Goals    LTG Date to Achieve 11/11/18  -BC     LTG 1 Pt. and/or caregiver independent with short stretch compression bandaging for volume reduction.  -BC     LTG 2 Independent with donning/doffing compression garment.  -BC     LTG 3 Independent with lymphedema management and HEP  -BC        Time Calculation    OT Goal Re-Cert Due Date 12/11/18  -BC       User Key  (r) = Recorded By, (t) = Taken By, (c) = Cosigned By    Initials Name Provider Type    BC  Vera Bowman OT Occupational Therapist               Outcome Measure Options: AM-PAC 6 Clicks Daily Activity (OT)  How much help from another is currently needed...  Putting on and taking off regular lower body clothing?: a little  Bathing (including washing, rinsing, and drying): none  Toileting (which includes using toilet bed pan or urinal): none  Putting on and taking off regular upper body clothing: none  Taking care of personal grooming (such as brushing teeth): none  Eating meals: none  Score: 23           Time Calculation:   OT Start Time: 1330  OT Stop Time: 1523  OT Time Calculation (min): 113 min  OT Non-Billable Time (min): 20 min     Therapy Suggested Charges     Code   Minutes Charges    None               Therapy Charges for Today     Code Description Service Date Service Provider Modifiers Qty    58813351322 HC OT LYMPHEDEMA MANAGEMENT-15 MIN 9/11/2018 Vera Bowman OT  1    [id number] HC OT MANUAL THERAPY EA 15 MIN 9/11/2018 Vera Bowman OT GO 1    [id number] HC OT EVAL HIGH COMPLEXITY 3 9/11/2018 Vera Bowman OT GO 1                      Vera Bowman OT  9/11/2018

## 2018-12-05 ENCOUNTER — DOCUMENTATION (OUTPATIENT)
Dept: OCCUPATIONAL THERAPY | Facility: HOSPITAL | Age: 53
End: 2018-12-05

## 2018-12-05 DIAGNOSIS — R60.0 BILATERAL LOWER EXTREMITY EDEMA: Primary | ICD-10-CM

## 2019-04-17 ENCOUNTER — TRANSCRIBE ORDERS (OUTPATIENT)
Dept: OCCUPATIONAL THERAPY | Facility: HOSPITAL | Age: 54
End: 2019-04-17

## 2019-04-17 DIAGNOSIS — I89.0 LYMPHEDEMA: Primary | ICD-10-CM

## 2019-04-22 ENCOUNTER — HOSPITAL ENCOUNTER (OUTPATIENT)
Dept: OCCUPATIONAL THERAPY | Facility: HOSPITAL | Age: 54
Setting detail: THERAPIES SERIES
Discharge: HOME | End: 2019-04-22

## 2019-04-22 DIAGNOSIS — R60.0 BILATERAL LOWER EXTREMITY EDEMA: Primary | ICD-10-CM

## 2019-04-22 PROCEDURE — 97166 OT EVAL MOD COMPLEX 45 MIN: CPT

## 2019-04-22 PROCEDURE — 97140 MANUAL THERAPY 1/> REGIONS: CPT

## 2019-04-22 NOTE — THERAPY EVALUATION
Outpatient Occupational Therapy Lymphedema Initial Evaluation   G[facility]     Patient Name: El Nielsen  : 1965  MRN: [id number]  Today's Date: 2019      Visit Date: 2019    There is no problem list on file for this patient.       Past Medical History:   Diagnosis Date   • Arthritis    • Hypertension         Past Surgical History:   Procedure Laterality Date   • EYE SURGERY     • VASCULAR SURGERY           Visit Dx:     ICD-10-CM ICD-9-CM   1. Bilateral lower extremity edema R60.0 782.3       Patient History     Row Name 19 1400             History    Chief Complaint  Balance Problems;Difficulty Walking;Difficulty with daily activities;Fatigue/poor endurance;Impaired sensation;Joint stiffness;Joint swelling;Muscle tenderness;Muscle weakness;Numbness;Pain;Swelling;Tightness  -BC      Type of Pain  Ankle pain;Foot pain;Knee pain;Lower Extremity / Leg  -BC      Date Current Problem(s) Began  14  -BC      Brief Description of Current Complaint  BLE swelling  -BC      Previous treatment for THIS PROBLEM  Massage;Pain Management;Rehabilitation  -BC      Patient/Caregiver Goals  Relieve pain;Improve mobility;Improve strength;Know what to do to help the symptoms;Decrease swelling  -BC      Current Tobacco Use  yes  -BC      Smoking Status  current  -BC      Patient's Rating of General Health  Fair  -BC      Hand Dominance  right-handed  -BC      Patient seeing anyone else for problem(s)?  PCP  -BC      How has patient tried to help current problem?  Surgery Medicine  -BC      Surgery/Hospitalization  yes  -BC      History of Previous Related Injuries  L knee scope x 3  -BC         Pain     Pain Location  Ankle;Foot;Knee  -BC      Pain at Present  7;8  -BC      Pain at Best  3  -BC      Pain at Worst  10  -BC      Pain Frequency  Constant/continuous  -BC      Pain Description  Aching;Burning;Cramping;Discomfort;Dull;Heaviness;Itching;Jabbing;Nagging;Numbness;Penetrating;Pins and  needles;Pressure;Radiating;Sharp;Shooting;Sore;Spasm;Squeezing;Stabbing;Tender;Throbbing;Tightness;Tingling;Tiring  -BC      What Performance Factors Make the Current Problem(s) WORSE?  standing, movement  -BC      What Performance Factors Make the Current Problem(s) BETTER?  elevation   -BC      Tolerance Time- Standing  Imp.  -BC      Tolerance Time- Sitting  Imp.  -BC      Tolerance Time- Walking  Imp.  -BC      Tolerance Time- Lying  WFL  -BC      Is your sleep disturbed?  Yes  -BC      Is medication used to assist with sleep?  No  -BC      Total hours of sleep per night  6-8 interrupted  -BC      What position do you sleep in?  Left sidelying  -BC      Difficulties at work?  y  -BC      Difficulties with ADL's?  y  -BC      Difficulties with recreational activities?  y  -BC      Multiple Pain Sites  Yes  -BC         Fall Risk Assessment    Any falls in the past year:  Yes  -BC      Number of falls reported in the last 12 months  3  -BC      Factors that contributed to the fall:  Lost balance  -BC      Does patient have a fear of falling  Yes (comment)  -BC         Services    Prior Rehab/Home Health Experiences  No  -BC         Daily Activities    Primary Language  English  -BC      How does patient learn best?  Demonstration  -BC      Patient is concerned about/has problems with  Difficulty with self care (i.e. bathing, dressing, toileting:;Flexibility;Performing job responsibilities/community activities (work, school,;Performing sports, recreation, and play activities;Sitting;Standing;Walking  -BC      Pt Participated in POC and Goals  Yes  -BC         Safety    Are you being hurt, hit, or frightened by anyone at home or in your life?  No  -BC      Are you being neglected by a caregiver  No  -BC        User Key  (r) = Recorded By, (t) = Taken By, (c) = Cosigned By    Initials Name Provider Type    Vera Mitchell OT Occupational Therapist          Lymphedema     Row Name 04/22/19 1400              Subjective Pain    Able to rate subjective pain?  yes  -BC      Pre-Treatment Pain Level  7  -BC      Subjective Pain Comment  Pain never completely goes away.  -BC         Lymphedema Assessment    Lymphedema Classification  RLE:;LLE:;stage 2 (Spontaneously Irreversible)  -BC         Posture/Observations    Posture- WNL  Posture is WNL  -BC         General ROM    GENERAL ROM COMMENTS  WFL  -BC         MMT (Manual Muscle Testing)    General MMT Comments  WFL  -BC         Lymphedema Edema Assessment    Ptting Edema Category  By grade out of 4;By severity  -BC      Pitting Edema  + 4/4;Severe  -BC      Stemmer Sign  negative  -BC         Skin Changes/Observations    Location/Assessment  Lower Extremity  -BC      Lower Extremity Conditions  bilateral:;dry;shiny;hairless;scaly  -BC      Lower Extremity Color/Pigment  bilateral:;erythema;purple;red;blanchable;hyperpigmented;brawny;fibrosis  -BC         Lymphedema Sensation    Lymphedema Sensation Reports  RLE:;LLE:;numbness;tingling  -BC      Lymphedema Sensation Tests  deep touch  -BC      Lymphedema Deep Touch  RLE:;LLE:;mild impairment  -BC         Lymphedema Pulses/Capillary Refill    Lymphedema Pulses/Capillary Refill  lower extremity pulses  -BC         Lymphedema Measurements    Measurement Type(s)  Quick Girth  -BC      Quick Girth Areas  Lower extremities  -BC         LLE Quick Girth (cm)    Met-heads  31.8 cm  -BC      Mid foot  32.8 cm  -BC      Smallest ankle  33.8 cm  -BC      Largest calf  51 cm  -BC      Tib tuberosity  47.1 cm  -BC      Mid patella  56.8 cm  -BC      Distal thigh  63.5 cm  -BC      Proximal thigh  68.3 cm  -BC      Other 1  37 cm  -BC      Other 2  465.3 cm  -BC         RLE Quick Girth (cm)    Met-heads  32.3 cm  -BC      Mid foot  36.5 cm  -BC      Smallest ankle  40.5 cm  -BC      Largest calf  58.5 cm  -BC      Tib tuberosity  51.4 cm  -BC      Mid patella  55.9 cm  -BC      Distal thigh  63.4 cm  -BC      Proximal thigh  70.9 cm  -BC       Other 1  43.9 cm  -BC      Other 2  53.9 cm  -BC      RLE Quick Girth Total  507.2  -BC         Manual Lymphatic Drainage    Manual Lymphatic Drainage  extremity treatment  -BC      Extremity Treatment  MLD to full limb  -BC      MLD to Full Limb  BLE  -BC      Manual Lymphatic Drainage Comments  Pt. RLE increased tightness/girth density.  -BC         Compression/Skin Care    Compression/Skin Care  bandaging  -BC      Bandage Layers  soft foam- 1/4 inch;cotton elastic stocking- single layer (comment size);short-stretch bandages (comment size/quantity)  -BC      Bandaging Comments  Pt. brought bandages in from home.  -BC      Bandaging Technique  circumferential/spiral;light compression  -BC        User Key  (r) = Recorded By, (t) = Taken By, (c) = Cosigned By    Initials Name Provider Type    Vera Mitchell OT Occupational Therapist                           OT Goals     Row Name 04/22/19 1500          OT Short Term Goals    STG Date to Achieve  06/05/19  -BC     STG 1  Pt. familiar w/precautions, skin care and self management of lymphedema.  -BC     STG 2  Decrease pitting edema to 3/4 for decresed risk of infection.  -BC     STG 3  HEP to assist with improved lymphatic flow.  -BC     STG 4  Sefl care instructions for home MLD for volume reduction.  -BC        Long Term Goals    LTG Date to Achieve  07/22/19  -BC     LTG 1  Pt. and/or care giver independent with short stretch compression bandaging for volume reduction.  -BC     LTG 2  Decrease pitting edema to 2/4 for decreased risk of infection.  -BC     LTG 3  Independent with lymphedema management and HEP.  -BC        Time Calculation    OT Goal Re-Cert Due Date  07/22/19  -BC       User Key  (r) = Recorded By, (t) = Taken By, (c) = Cosigned By    Initials Name Provider Type    Vera Mitchell OT Occupational Therapist          OT Assessment/Plan     Row Name 04/22/19 1606          OT Assessment    Functional Limitations  Impaired  locomotion;Limitations in community activities;Performance in work activities;Performance in self-care ADL;Performance in leisure activities  -BC     Impairments  Balance;Edema;Impaired lymphatic circulation;Pain;Endurance  -BC     Assessment Comments  Pt. presented with exacerbation of symptoms increased pain, increased girth.    -BC     Please refer to paper survey for additional self-reported information  Yes  -BC     OT Diagnosis  Lymphedema  -BC     OT Rehab Potential  Good  -BC     Patient/caregiver participated in establishment of treatment plan and goals  Yes  -BC     Patient would benefit from skilled therapy intervention  Yes  -BC        OT Plan    OT Frequency  Other (comment)  -BC     Predicted Duration of Therapy Intervention (Therapy Eval)  1x every 3 weeks  -BC     Planned CPT's?  OT EVAL HIGH COMPLEXITY: 81726;OT MANUAL THERAPY EA 15 MIN: 76864;OT VASOPNEUMATIC DEVICE: 42817;OT SELF CARE/MGMT/TRAIN 15 MIN: 23168;OT THER ACT EA 15 MIN: 73941VD Lymphedema management  -BC     Planned Therapy Interventions (Optional Details)  home exercise program;manual therapy techniques;patient/family education;ROM (Range of Motion) Lymphedema Management  -BC     OT Plan Comments  Pt. would benefit from skilled OT to address s/s of lymphedema.  -BC       User Key  (r) = Recorded By, (t) = Taken By, (c) = Cosigned By    Initials Name Provider Type    BC Vear Bowman OT Occupational Therapist                    Time Calculation:   OT Start Time: 1430  OT Stop Time: 1545  OT Time Calculation (min): 75 min  OT Non-Billable Time (min): 30 min     Therapy Charges for Today     Code Description Service Date Service Provider Modifiers Qty    96993112842  OT MANUAL THERAPY EA 15 MIN 4/22/2019 Vera Bowman OT GO 2    07014048986 HC OT EVAL MOD COMPLEXITY 1 4/22/2019 Vera Bowman OT GO 1                    Vera Bowman OT  4/22/2019 and Outpatient Occupational Therapy Lymphedema Treatment Note  CASSANDRA Alonso      Patient Name: El Nielsen  : 1965  MRN: [id number]  Today's Date: 2019      Visit Date: 2019    There is no problem list on file for this patient.       Past Medical History:   Diagnosis Date   • Arthritis    • Hypertension         Past Surgical History:   Procedure Laterality Date   • EYE SURGERY     • VASCULAR SURGERY           Visit Dx:      ICD-10-CM ICD-9-CM   1. Bilateral lower extremity edema R60.0 782.3       Lymphedema     Row Name 19 1400             Subjective Pain    Able to rate subjective pain?  yes  -BC      Pre-Treatment Pain Level  7  -BC      Subjective Pain Comment  Pain never completely goes away.  -BC         Lymphedema Assessment    Lymphedema Classification  RLE:;LLE:;stage 2 (Spontaneously Irreversible)  -BC         Posture/Observations    Posture- WNL  Posture is WNL  -BC         General ROM    GENERAL ROM COMMENTS  WFL  -BC         MMT (Manual Muscle Testing)    General MMT Comments  WFL  -BC         Lymphedema Edema Assessment    Ptting Edema Category  By grade out of 4;By severity  -BC      Pitting Edema  + 4/4;Severe  -BC      Stemmer Sign  negative  -BC         Skin Changes/Observations    Location/Assessment  Lower Extremity  -BC      Lower Extremity Conditions  bilateral:;dry;shiny;hairless;scaly  -BC      Lower Extremity Color/Pigment  bilateral:;erythema;purple;red;blanchable;hyperpigmented;brawny;fibrosis  -BC         Lymphedema Sensation    Lymphedema Sensation Reports  RLE:;LLE:;numbness;tingling  -BC      Lymphedema Sensation Tests  deep touch  -BC      Lymphedema Deep Touch  RLE:;LLE:;mild impairment  -BC         Lymphedema Pulses/Capillary Refill    Lymphedema Pulses/Capillary Refill  lower extremity pulses  -BC         Lymphedema Measurements    Measurement Type(s)  Quick Girth  -BC      Quick Girth Areas  Lower extremities  -BC         LLE Quick Girth (cm)    Met-heads  31.8 cm  -BC      Mid foot  32.8 cm  -BC      Smallest ankle  33.8 cm   -BC      Largest calf  51 cm  -BC      Tib tuberosity  47.1 cm  -BC      Mid patella  56.8 cm  -BC      Distal thigh  63.5 cm  -BC      Proximal thigh  68.3 cm  -BC      Other 1  37 cm  -BC      Other 2  465.3 cm  -BC         RLE Quick Girth (cm)    Met-heads  32.3 cm  -BC      Mid foot  36.5 cm  -BC      Smallest ankle  40.5 cm  -BC      Largest calf  58.5 cm  -BC      Tib tuberosity  51.4 cm  -BC      Mid patella  55.9 cm  -BC      Distal thigh  63.4 cm  -BC      Proximal thigh  70.9 cm  -BC      Other 1  43.9 cm  -BC      Other 2  53.9 cm  -BC      RLE Quick Girth Total  507.2  -BC         Manual Lymphatic Drainage    Manual Lymphatic Drainage  extremity treatment  -BC      Extremity Treatment  MLD to full limb  -BC      MLD to Full Limb  BLE  -BC      Manual Lymphatic Drainage Comments  Pt. RLE increased tightness/girth density.  -BC         Compression/Skin Care    Compression/Skin Care  bandaging  -BC      Bandage Layers  soft foam- 1/4 inch;cotton elastic stocking- single layer (comment size);short-stretch bandages (comment size/quantity)  -BC      Bandaging Comments  Pt. brought bandages in from home.  -BC      Bandaging Technique  circumferential/spiral;light compression  -BC        User Key  (r) = Recorded By, (t) = Taken By, (c) = Cosigned By    Initials Name Provider Type    BC Vera Bowman OT Occupational Therapist                  OT Assessment/Plan     Row Name 04/22/19 5196          OT Assessment    Functional Limitations  Impaired locomotion;Limitations in community activities;Performance in work activities;Performance in self-care ADL;Performance in leisure activities  -BC     Impairments  Balance;Edema;Impaired lymphatic circulation;Pain;Endurance  -BC     Assessment Comments  Pt. presented with exacerbation of symptoms increased pain, increased girth.    -BC     Please refer to paper survey for additional self-reported information  Yes  -BC     OT Diagnosis  Lymphedema  -BC     OT Rehab  Potential  Good  -BC     Patient/caregiver participated in establishment of treatment plan and goals  Yes  -BC     Patient would benefit from skilled therapy intervention  Yes  -BC        OT Plan    OT Frequency  Other (comment)  -BC     Predicted Duration of Therapy Intervention (Therapy Eval)  1x every 3 weeks  -BC     Planned CPT's?  OT EVAL HIGH COMPLEXITY: 30711;OT MANUAL THERAPY EA 15 MIN: 21589;OT VASOPNEUMATIC DEVICE: 95624;OT SELF CARE/MGMT/TRAIN 15 MIN: 28467;OT THER ACT EA 15 MIN: [id number] Lymphedema management  -BC     Planned Therapy Interventions (Optional Details)  home exercise program;manual therapy techniques;patient/family education;ROM (Range of Motion) Lymphedema Management  -BC     OT Plan Comments  Pt. would benefit from skilled OT to address s/s of lymphedema.  -BC       User Key  (r) = Recorded By, (t) = Taken By, (c) = Cosigned By    Initials Name Provider Type    Vera Mitchell, OT Occupational Therapist                OT Goals     Row Name 04/22/19 1500          OT Short Term Goals    STG Date to Achieve  06/05/19  -BC     STG 1  Pt. familiar w/precautions, skin care and self management of lymphedema.  -BC     STG 2  Decrease pitting edema to 3/4 for decresed risk of infection.  -BC     STG 3  HEP to assist with improved lymphatic flow.  -BC     STG 4  Sefl care instructions for home MLD for volume reduction.  -BC        Long Term Goals    LTG Date to Achieve  07/22/19  -BC     LTG 1  Pt. and/or care giver independent with short stretch compression bandaging for volume reduction.  -BC     LTG 2  Decrease pitting edema to 2/4 for decreased risk of infection.  -BC     LTG 3  Independent with lymphedema management and HEP.  -BC        Time Calculation    OT Goal Re-Cert Due Date  07/22/19  -BC       User Key  (r) = Recorded By, (t) = Taken By, (c) = Cosigned By    Initials Name Provider Type    Vera Mitchell OT Occupational Therapist                           Time Calculation:   OT  Start Time: 1430  OT Stop Time: 1545  OT Time Calculation (min): 75 min  OT Non-Billable Time (min): 30 min     Therapy Charges for Today     Code Description Service Date Service Provider Modifiers Qty    43197904588 HC OT MANUAL THERAPY EA 15 MIN 4/22/2019 Vera Bowman OT GO 2    [id number]  OT EVAL MOD COMPLEXITY 1 4/22/2019 Vera Bowman OT GO 1                      Vera Bowman OT  4/22/2019

## 2019-06-12 ENCOUNTER — DOCUMENTATION (OUTPATIENT)
Dept: OCCUPATIONAL THERAPY | Facility: HOSPITAL | Age: 54
End: 2019-06-12

## 2019-06-12 NOTE — THERAPY DISCHARGE NOTE
Outpatient Occupational Therapy Lymphedema D/C/Discharge Summary       Patient Name: El Nielsen  : 1965  MRN: [id number]  Today's Date: 2019      Visit Date: 2019    There is no problem list on file for this patient.       Past Medical History:   Diagnosis Date   • Arthritis    • Hypertension         Past Surgical History:   Procedure Laterality Date   • EYE SURGERY     • VASCULAR SURGERY           Visit Dx:    No diagnosis found.                                OT Goals     Row Name 19 1000          OT Short Term Goals    STG 1  Pt. familiar w/precautions, skin care and self management of lymphedema.  -BC     STG 1 Progress Comments  Pt. did not return for follow up visits.  Unable to assess goals.  -BC     STG 2  Decrease pitting edema to 3/4 for decresed risk of infection.  -BC     STG 3  HEP to assist with improved lymphatic flow.  -BC     STG 4  Sefl care instructions for home MLD for volume reduction.  -BC        Long Term Goals    LTG 1  Pt. and/or care giver independent with short stretch compression bandaging for volume reduction.  -BC     LTG 2  Decrease pitting edema to 2/4 for decreased risk of infection.  -BC     LTG 3  Independent with lymphedema management and HEP.  -BC       User Key  (r) = Recorded By, (t) = Taken By, (c) = Cosigned By    Initials Name Provider Type    Vera Mitchell OT Occupational Therapist             Pt. Did not return for follow up visit after evaluation.              Time Calculation:                     OP OT Discharge Summary  Date of Discharge: 19  Reason for Discharge: Non-compliant  Outcomes Achieved: Other  Discharge Instructions: N/A      Vera Bowman OT  2019

## 2020-06-11 ENCOUNTER — TRANSCRIBE ORDERS (OUTPATIENT)
Dept: ADMINISTRATIVE | Facility: HOSPITAL | Age: 55
End: 2020-06-11

## 2020-06-11 DIAGNOSIS — I89.0 LYMPHEDEMA: Primary | ICD-10-CM

## 2020-06-16 ENCOUNTER — HOSPITAL ENCOUNTER (OUTPATIENT)
Dept: OCCUPATIONAL THERAPY | Facility: HOSPITAL | Age: 55
Setting detail: THERAPIES SERIES
Discharge: HOME | End: 2020-06-16

## 2020-06-16 DIAGNOSIS — I89.0 LYMPHEDEMA: ICD-10-CM

## 2020-06-16 DIAGNOSIS — R60.0 BILATERAL LOWER EXTREMITY EDEMA: Primary | ICD-10-CM

## 2020-06-16 PROCEDURE — 97167 OT EVAL HIGH COMPLEX 60 MIN: CPT

## 2020-06-16 PROCEDURE — 97016 VASOPNEUMATIC DEVICE THERAPY: CPT

## 2020-06-16 PROCEDURE — 97139 UNLISTED THERAPEUTIC PX: CPT

## 2020-06-16 PROCEDURE — 97140 MANUAL THERAPY 1/> REGIONS: CPT

## 2020-06-16 NOTE — THERAPY EVALUATION
Outpatient Occupational Therapy Lymphedema Initial Evaluation   [place]     Patient Name: El Nielsen  : 1965  MRN: [id number]  Today's Date: 2020      Visit Date: 2020    There is no problem list on file for this patient.       Past Medical History:   Diagnosis Date   • Arthritis    • Elevated cholesterol    • GERD (gastroesophageal reflux disease)    • Hypertension    • Sleep apnea         Past Surgical History:   Procedure Laterality Date   • EYE SURGERY     • VASCULAR SURGERY           Visit Dx:     ICD-10-CM ICD-9-CM   1. Bilateral lower extremity edema R60.0 782.3   2. Lymphedema I89.0 457.1       Patient History     Row Name 20 0800             History    Chief Complaint  Balance Problems;Burn;Cough;Difficulty Walking;Difficulty with daily activities;Fatigue/poor endurance;Headache;Impaired sensation;Joint stiffness;Joint swelling;Muscle tenderness;Muscle weakness;Numbness;Pain;Problems breathing/shortness of breath;Swelling;Tightness;Tinglings  -AB      Type of Pain  Foot pain;Lower Extremity / Leg  -AB      Date Current Problem(s) Began  14  -AB      Previous treatment for THIS PROBLEM  Other (comment) lymphedema therapy  -AB      Patient/Caregiver Goals  Relieve pain;Return to prior level of function;Improve mobility;Improve strength;Know what to do to help the symptoms;Decrease swelling  -AB      Current Tobacco Use  yes  -AB      Patient's Rating of General Health  Poor  -AB      Hand Dominance  right-handed  -AB      Occupation/sports/leisure activities  Long term disability  -AB      Patient seeing anyone else for problem(s)?  PCP  -AB      How has patient tried to help current problem?  Has sequential pump for LE, pumps 3x week, wraps 3x per week, vascular surgery  -AB      Related/Recent Hospitalizations  No  -AB      History of Previous Related Injuries  orthoscopic surgery X3 to L knee, meniscus tear on R knee  -AB         Pain     Pain Location  Foot;Leg;Knee  " -AB      Pain at Present  7  -AB      Pain at Best  4  -AB      Pain at Worst  10  -AB      Pain Frequency  Constant/continuous  -AB      Pain Description  Aching;Burning;Cramping;Discomfort;Dull;Headache;Heaviness;Itching;Nagging;Numbness;Pins and needles;Pressure;Radiating;Sharp;Shooting;Sore;Spasm;Squeezing;Tender;Throbbing;Tightness;Tingling;Tiring  -AB      What Performance Factors Make the Current Problem(s) WORSE?  Being on feet for longer durations of time  -AB      What Performance Factors Make the Current Problem(s) BETTER?  Elevation, pumping, wrapping  -AB      Is your sleep disturbed?  Yes  -AB      Is medication used to assist with sleep?  No  -AB      Total hours of sleep per night  3 hours per night  -AB      What position do you sleep in?  Other (comment) \"tosses and turns\"  -AB      Difficulties at work?  yes  -AB      Difficulties with ADL's?  yes  -AB      Difficulties with recreational activities?  yes  -AB         Fall Risk Assessment    Any falls in the past year:  No  -AB      Number of falls reported in the last 12 months  none  -AB      Does patient have a fear of falling  Yes (comment)  -AB      Previous Functional Level  Ambulation;ADLs;IADLs;Leisure Activities;Up/Down Stairs;Other (comment) difficulty with all and requires assistance w/ socks/shoes  -AB         Services    Prior Rehab/Home Health Experiences  Yes lymphedema therapy  -AB      When was the prior experience with Rehab/Home Health  April 2019  -AB      Where was the prior experience with Rehab/Home Health  [facility]  -AB      Are you currently receiving Home Health services  No  -AB      Do you plan to receive Home Health services in the near future  No  -AB         Daily Activities    Patient is concerned about/has problems with  Climbing Stairs;Coordination;Difficulty with self care (i.e. bathing, dressing, toileting:;Flexibility;Performing home management (household chores, shopping, care of dependents);Performing job " responsibilities/community activities (work, school,;Performing sports, recreation, and play activities;Standing;Transfers (getting out of a chair, bed);Walking  -AB      Does patient have problems with the following?  None  -AB      Pt Participated in POC and Goals  Yes  -AB         Safety    Are you being hurt, hit, or frightened by anyone at home or in your life?  No  -AB      Are you being neglected by a caregiver  No  -AB        User Key  (r) = Recorded By, (t) = Taken By, (c) = Cosigned By    Initials Name Provider Type    Chely Mckenna OT Occupational Therapist          Lymphedema     Row Name 06/16/20 0800             Subjective Pain    Able to rate subjective pain?  yes  -AB      Pre-Treatment Pain Level  7  -AB      Subjective Pain Comment  Pt. has continues pain  -AB         Subjective Comments    Subjective Comments  Pt. presents for initial evaluation. He reports that he has compression pump at home, however, he is having trouble managing lymphedema of both LE's at home.   -AB         Lymphedema Assessment    Lymphedema Classification  RLE:;LLE:;stage 2 (Spontaneously Irreversible) abdomen  -AB      Infections or Cellulitis?  no  -AB         Posture/Observations    Posture- WNL  Posture is WNL  -AB         General ROM    GENERAL ROM COMMENTS  WFL  -AB         MMT (Manual Muscle Testing)    General MMT Comments  WFL  -AB         Lymphedema Edema Assessment    Ptting Edema Category  By severity  -AB      Pitting Edema  Severe  -AB      Edema Assessment Comment  Pt. has developed fibrotic areas in BLE's below the knees.  -AB         Skin Changes/Observations    Location/Assessment  Lower Extremity  -AB      Lower Extremity Conditions  bilateral:;dry;hairless;crust;scaly;fragile  -AB      Lower Extremity Color/Pigment  bilateral:;red;blanchable;hyperpigmented;brawny;fibrosis  -AB      Skin Observations Comment  1 small area on LLE that has been scratched, but is healing; no weeping noted   -AB          Lymphedema Sensation    Lymphedema Sensation Reports  RLE:;LLE:;numbness;tingling  -AB      Lymphedema Sensation Tests  light touch  -AB      Lymphedema Light Touch  RLE:;LLE:;severe impairment  -AB         Lymphedema Pulses/Capillary Refill    Lymphedema Pulses/Capillary Refill  capillary refill  -AB      Capillary Refill  lower extremity capillary refill  -AB      Lower Extremity Capillary Refill  right:;left:;less than 3 seconds  -AB         Lymphedema Measurements    Measurement Type(s)  Quick Girth  -AB      Quick Girth Areas  Lower extremities  -AB         LLE Quick Girth (cm)    Met-heads  32.4 cm  -AB      Mid foot  31.6 cm  -AB      Smallest ankle  42 cm  -AB      Largest calf  59.7 cm  -AB      Tib tuberosity  51.2 cm  -AB      Mid patella  53.9 cm  -AB      Distal thigh  67.4 cm  -AB      Proximal thigh  75.4 cm  -AB      Other 1  41.4 cm  -AB      Other 2  50.3 cm  -AB         RLE Quick Girth (cm)    Met-heads  35 cm  -AB      Mid foot  34.5 cm  -AB      Smallest ankle  43 cm  -AB      Largest calf  62 cm  -AB      Tib tuberosity  51.1 cm  -AB      Mid patella  57.3 cm  -AB      Distal thigh  66.1 cm  -AB      Proximal thigh  78.4 cm  -AB      Other 1  45.6 cm  -AB      Other 2  49.4 cm  -AB      RLE Quick Girth Total  522.4  -AB         Manual Lymphatic Drainage    Manual Lymphatic Drainage  initial sequence;extremity treatment;opened regional lymph nodes  -AB      Initial Sequence  abdomen  -AB      Opened Regional Lymph Nodes  axillary;inguinal  -AB      Extremity Treatment  MLD to full limb  -AB      MLD to Full Limb  BLE's  -AB      Manual Therapy  MLD to BLE's, abdomen, axillary, inguinal  -AB         Compression/Skin Care    Compression/Skin Care  skin care;wrapping location;bandaging  -AB      Skin Care  washed/dried;lotion applied  -AB      Wrapping Location  lower extremity  -AB      Wrapping Location LE  bilateral: ankle to below knee per pt. request  -AB      Bandage Layers  cotton  elastic stocking- single layer (comment size);soft foam- 1/4 inch;short-stretch bandages (comment size/quantity)  -AB      Compression/Skin Care Comments  Compression pump X30 mins per LE, X16 mins to abdomen  -AB        User Key  (r) = Recorded By, (t) = Taken By, (c) = Cosigned By    Initials Name Provider Type    Chely Mckenna, OT Occupational Therapist                           OT Goals     Row Name 06/16/20 1500          OT Short Term Goals    STG Date to Achieve  07/16/20  -AB     STG 1  Pt./caregiver will be familiar with precautions, skin care, and self management of lymphedema.   -AB     STG 2  Pt. will decrease overall edema to moderate-severe to decrease risk of infection.   -AB     STG 3  Pt. will reduce overall girth by 5cm to reduce risk of infection.   -AB     STG 4  Pt. will be familiar with HEP to assist with improved lymphatic flow.   -AB     STG 5  Pt./caregiver will be familiar with self care instructions for home MLD for volume reduction.   -AB        Long Term Goals    LTG Date to Achieve  09/16/20  -AB     LTG 1  Pt./caregiver will be independent with short stretch compression bandaging for volume reduction  -AB     LTG 2  Pt. will decrease edema to moderate for decreased risk of infection.   -AB     LTG 3  Pt. will reduce overall girth by 10cm to reduce risk of infection.   -AB     LTG 4  Pt./caregiver will be independent with donning/doffing compression garment.   -AB     LTG 5  Pt./caregiver will be independent with lymphedema management and HEP.   -AB        Time Calculation    OT Goal Re-Cert Due Date  09/16/20  -AB       User Key  (r) = Recorded By, (t) = Taken By, (c) = Cosigned By    Initials Name Provider Type    Chely Mckenna, OT Occupational Therapist          OT Assessment/Plan     Row Name 06/16/20 1512          OT Assessment    Functional Limitations  Decreased safety during functional activities;Impaired gait;Impaired locomotion;Limitation in home  management;Limitations in community activities;Performance in work activities;Performance in sport activities;Performance in self-care ADL;Performance in leisure activities;Limitations in functional capacity and performance  -AB     Impairments  Balance;Coordination;Edema;Endurance;Gait;Impaired flexibility;Sensation;Muscle strength;Impaired aerobic capacity;Pain;Impaired lymphatic circulation;Impaired sensory integrity  -AB     OT Diagnosis  Lymphedema of BLE's and abdomen  -AB     OT Rehab Potential  Good  -AB     Patient/caregiver participated in establishment of treatment plan and goals  Yes  -AB     Patient would benefit from skilled therapy intervention  Yes  -AB        OT Plan    OT Frequency  3x/week  -AB     Predicted Duration of Therapy Intervention (Therapy Eval)  90 days  -AB     Planned CPT's?  OT EVAL HIGH COMPLEXITY: 57853;OT BIS XTRACELL FLUID ANALYSIS: 74954;OT SELF CARE/MGMT/TRAIN 15 MIN: 96871;OT THER ACT EA 15 MIN: 56993EH;OT THER PROC EA 15 MIN: 42154NK;OT MANUAL THERAPY EA 15 MIN: 33404;OT VASOPNEUMATIC DEVICE: 16104 Lymphedema Management  -AB     Planned Therapy Interventions (Optional Details)  home exercise program;manual therapy techniques;patient/family education;other (see comments) CDT, MLD  -AB     OT Plan Comments  Pt. would benefit from OT services to address lymphedema in BLE's and improve skin integrity 3x per week for approximately 90 days.   -AB       User Key  (r) = Recorded By, (t) = Taken By, (c) = Cosigned By    Initials Name Provider Type    AB Chely Sierra OT Occupational Therapist                    Time Calculation:   OT Start Time: 0810  OT Stop Time: 1005  OT Time Calculation (min): 115 min  OT Non-Billable Time (min): 60 min  Total Timed Code Minutes- OT: 115 minute(s)     Therapy Charges for Today     Code Description Service Date Service Provider Modifiers Qty    94341237177 HC OT EVAL HIGH COMPLEXITY 3 6/16/2020 Chely Sierra OT GO 1     97849912624 HC OT VASOPNEUMAT DEVIC 1 OR MORE AREAS 6/16/2020 Chely Sierra, OT GO 1    08422268271 HC OT MANUAL THERAPY EA 15 MIN 6/16/2020 Chely Sierra OT GO 3    54524203800 HC OT LYMPHEDEMA MANAGEMENT-15 MIN 6/16/2020 Chely Sierra, OT  1                    Chely Sierra OT  6/16/2020

## 2020-07-13 ENCOUNTER — HOSPITAL ENCOUNTER (OUTPATIENT)
Dept: OCCUPATIONAL THERAPY | Facility: HOSPITAL | Age: 55
Setting detail: THERAPIES SERIES
Discharge: HOME | End: 2020-07-13

## 2020-07-13 DIAGNOSIS — R60.0 BILATERAL LOWER EXTREMITY EDEMA: Primary | ICD-10-CM

## 2020-07-13 DIAGNOSIS — I89.0 LYMPHEDEMA: ICD-10-CM

## 2020-07-13 PROCEDURE — 97535 SELF CARE MNGMENT TRAINING: CPT

## 2020-07-13 PROCEDURE — 97140 MANUAL THERAPY 1/> REGIONS: CPT

## 2020-07-13 PROCEDURE — 97139 UNLISTED THERAPEUTIC PX: CPT

## 2020-07-13 PROCEDURE — 97016 VASOPNEUMATIC DEVICE THERAPY: CPT

## 2020-07-13 NOTE — THERAPY TREATMENT NOTE
Outpatient Occupational Therapy Lymphedema Treatment Note   [place]     Patient Name: El Nielsen  : 1965  MRN: [id number]  Today's Date: 2020      Visit Date: 2020    There is no problem list on file for this patient.       Past Medical History:   Diagnosis Date   • Arthritis    • Elevated cholesterol    • GERD (gastroesophageal reflux disease)    • Hypertension    • Sleep apnea         Past Surgical History:   Procedure Laterality Date   • EYE SURGERY     • VASCULAR SURGERY           Visit Dx:      ICD-10-CM ICD-9-CM   1. Bilateral lower extremity edema R60.0 782.3   2. Lymphedema I89.0 457.1       Lymphedema     Row Name 20 0800             Subjective Pain    Able to rate subjective pain?  yes  -AB      Pre-Treatment Pain Level  7  -AB      Subjective Pain Comment  BLE's  -AB         Subjective Comments    Subjective Comments  Pt. presents to therapy this date discouraged with how legs look and feel. He states that his daughter fell and broke her ankle after his intital visit, and he has had to miss due to taking care of her.   -AB         Lymphedema Assessment    Lymphedema Classification  RLE:;LLE:;stage 2 (Spontaneously Irreversible) abdomen  -AB      Infections or Cellulitis?  no  -AB         Posture/Observations    Posture- WNL  Posture is WNL  -AB         Lymphedema Edema Assessment    Ptting Edema Category  By severity  -AB      Pitting Edema  Severe  -AB         Skin Changes/Observations    Location/Assessment  Lower Extremity  -AB      Lower Extremity Conditions  bilateral:;dry;hairless;crust;scaly;fragile  -AB      Lower Extremity Color/Pigment  bilateral:;red;blanchable;hyperpigmented;brawny;fibrosis  -AB      Skin Observations Comment  1 small area on L shin area; drainage noted w/ compression pump  -AB         Lymphedema Sensation    Lymphedema Sensation Reports  RLE:;LLE:;numbness;tingling  -AB      Lymphedema Sensation Tests  light touch  -AB      Lymphedema Light  Touch  RLE:;LLE:;severe impairment  -AB         Lymphedema Pulses/Capillary Refill    Lymphedema Pulses/Capillary Refill  --  -AB      Capillary Refill  --  -AB      Lower Extremity Capillary Refill  --  -AB         Lymphedema Measurements    Measurement Type(s)  Quick Girth  -AB      Quick Girth Areas  Lower extremities  -AB         LLE Quick Girth (cm)    Met-heads  32.3 cm  -AB      Mid foot  34.1 cm  -AB      Smallest ankle  39.8 cm  -AB      Largest calf  59 cm  -AB      Tib tuberosity  52.6 cm  -AB      Mid patella  57 cm  -AB      Distal thigh  68.7 cm  -AB      Proximal thigh  77 cm  -AB      Other 1  40.5 cm  -AB      Other 2  47 cm  -AB         RLE Quick Girth (cm)    Met-heads  33.3 cm  -AB      Mid foot  36.2 cm  -AB      Smallest ankle  42.6 cm  -AB      Largest calf  64.2 cm  -AB      Tib tuberosity  55.7 cm  -AB      Mid patella  57.7 cm  -AB      Distal thigh  68.1 cm  -AB      Proximal thigh  75.4 cm  -AB      Other 1  46.4 cm  -AB      Other 2  54.4 cm  -AB      RLE Quick Girth Total  534  -AB         Manual Lymphatic Drainage    Manual Lymphatic Drainage  initial sequence;extremity treatment;opened regional lymph nodes  -AB      Initial Sequence  abdomen  -AB      Opened Regional Lymph Nodes  axillary;inguinal  -AB      Extremity Treatment  MLD to full limb  -AB      MLD to Full Limb  BLE's  -AB      Manual Therapy  MLD to BLE's, abdomen, axillary, inguinals  -AB         Compression/Skin Care    Compression/Skin Care  skin care;wrapping location;bandaging  -AB      Skin Care  washed/dried;lotion applied  -AB      Wrapping Location  lower extremity  -AB      Wrapping Location LE  bilateral: base of toes to below knee  -AB      Bandage Layers  cotton elastic stocking- single layer (comment size);soft foam- 1/4 inch;short-stretch bandages (comment size/quantity)  -AB      Bandaging Comments  CompriFoam 12cmX2.5mX0.4cm X2 per LE; Comprilan 8cmX5m X1 per LE, Comprilan 52xpB5d X1 per LE; Comprilan  18elM8w X1 per LE  -AB      Bandaging Technique  circumferential/spiral;light compression;moderate compression  -AB      Compression/Skin Care Comments  compression pump X30 mins to BLE's, E45qlun to abdomen  -AB        User Key  (r) = Recorded By, (t) = Taken By, (c) = Cosigned By    Initials Name Provider Type    Chely Mckenna OT Occupational Therapist                                Therapy Education  Given: HEP, Edema management, Symptoms/condition management, Bandaging/dressing change  Program: Reinforced  How Provided: Verbal, Demonstration  Provided to: Patient  Level of Understanding: Verbalized                Time Calculation:   OT Start Time: 0810  OT Stop Time: 0955  OT Time Calculation (min): 105 min  OT Non-Billable Time (min): 25 min  Total Timed Code Minutes- OT: 105 minute(s)     Therapy Charges for Today     Code Description Service Date Service Provider Modifiers Qty    53078622781 HC OT VASOPNEUMAT DEVIC 1 OR MORE AREAS 7/13/2020 Chely Sierra OT GO 1    21856518715 HC OT LYMPHEDEMA MANAGEMENT-15 MIN 7/13/2020 Chely Sierra OT  1    54228355795 HC OT MANUAL THERAPY EA 15 MIN 7/13/2020 Chely Sierra OT GO 3    01961416555 HC OT SELF CARE/MGMT/TRAIN EA 15 MIN 7/13/2020 Chely Sierra OT GO 2                      Chely Sierra OT  7/13/2020

## 2020-07-15 ENCOUNTER — HOSPITAL ENCOUNTER (OUTPATIENT)
Dept: OCCUPATIONAL THERAPY | Facility: HOSPITAL | Age: 55
Setting detail: THERAPIES SERIES
Discharge: HOME | End: 2020-07-15

## 2020-07-15 DIAGNOSIS — I89.0 LYMPHEDEMA: ICD-10-CM

## 2020-07-15 DIAGNOSIS — R60.0 BILATERAL LOWER EXTREMITY EDEMA: Primary | ICD-10-CM

## 2020-07-15 PROCEDURE — 97139 UNLISTED THERAPEUTIC PX: CPT

## 2020-07-15 PROCEDURE — 97140 MANUAL THERAPY 1/> REGIONS: CPT

## 2020-07-15 PROCEDURE — 97016 VASOPNEUMATIC DEVICE THERAPY: CPT

## 2020-07-15 PROCEDURE — 97535 SELF CARE MNGMENT TRAINING: CPT

## 2020-07-15 NOTE — THERAPY PROGRESS REPORT/RE-CERT
Outpatient Occupational Therapy Lymphedema Progress Note   [facility]     Patient Name: El Nielsen  : 1965  MRN: [id number]  Today's Date: 7/15/2020      Visit Date: 07/15/2020    There is no problem list on file for this patient.       Past Medical History:   Diagnosis Date   • Arthritis    • Elevated cholesterol    • GERD (gastroesophageal reflux disease)    • Hypertension    • Sleep apnea         Past Surgical History:   Procedure Laterality Date   • EYE SURGERY     • VASCULAR SURGERY           Visit Dx:      ICD-10-CM ICD-9-CM   1. Bilateral lower extremity edema R60.0 782.3   2. Lymphedema I89.0 457.1       Lymphedema     Row Name 07/15/20 0800             Subjective Pain    Able to rate subjective pain?  yes  -AB      Pre-Treatment Pain Level  5  -AB      Subjective Pain Comment  BLE's  -AB         Subjective Comments    Subjective Comments  Pt. reports that he tolerated bandages from previous treatment date until last night.   -AB         Lymphedema Assessment    Lymphedema Classification  RLE:;LLE:;stage 2 (Spontaneously Irreversible);primary abdomen  -AB      Infections or Cellulitis?  no  -AB         Posture/Observations    Posture- WNL  Posture is WNL  -AB         Lymphedema Edema Assessment    Ptting Edema Category  By severity  -AB      Pitting Edema  Severe  -AB         Skin Changes/Observations    Location/Assessment  Lower Extremity  -AB      Lower Extremity Conditions  bilateral:;dry;hairless;crust;scaly;fragile  -AB      Lower Extremity Color/Pigment  bilateral:;red;blanchable;hyperpigmented;brawny;fibrosis  -AB      Skin Observations Comment  small area remains to L shin area, however, no drainage noted this date  -AB         Lymphedema Sensation    Lymphedema Sensation Reports  RLE:;LLE:;numbness;tingling  -AB      Lymphedema Sensation Tests  light touch  -AB      Lymphedema Light Touch  RLE:;LLE:;severe impairment  -AB         Lymphedema Measurements    Measurement Type(s)  Quick  Girth  -AB      Quick Girth Areas  Lower extremities  -AB         LLE Quick Girth (cm)    Met-heads  32.8 cm  -AB      Mid foot  33.2 cm  -AB      Smallest ankle  40.3 cm  -AB      Largest calf  59 cm  -AB      Tib tuberosity  52.4 cm  -AB      Mid patella  57.5 cm  -AB      Distal thigh  69 cm  -AB      Proximal thigh  74.5 cm  -AB      Other 1  41.1 cm  -AB      Other 2  48.7 cm  -AB         RLE Quick Girth (cm)    Met-heads  33.3 cm  -AB      Mid foot  36.7 cm  -AB      Smallest ankle  43.8 cm  -AB      Largest calf  63.1 cm  -AB      Tib tuberosity  54.4 cm  -AB      Mid patella  58.8 cm  -AB      Distal thigh  67.8 cm  -AB      Proximal thigh  73.9 cm  -AB      Other 1  45.1 cm  -AB      Other 2  53.2 cm  -AB      RLE Quick Girth Total  530.1  -AB         Manual Lymphatic Drainage    Manual Lymphatic Drainage  initial sequence;extremity treatment;opened regional lymph nodes  -AB      Initial Sequence  abdomen  -AB      Opened Regional Lymph Nodes  axillary;inguinal  -AB      Axillary  right;left  -AB      Inguinal  right;left  -AB      Extremity Treatment  MLD to full limb  -AB      MLD to Full Limb  BLE's  -AB      Manual Therapy  MLD to BLE's, abdomen, axillary, inguinals  -AB         Compression/Skin Care    Compression/Skin Care  skin care;wrapping location;bandaging  -AB      Skin Care  washed/dried;lotion applied  -AB      Wrapping Location  lower extremity  -AB      Wrapping Location LE  bilateral: base of toes to mid thigh  -AB      Bandage Layers  cotton elastic stocking- single layer (comment size);soft foam- 1/4 inch;short-stretch bandages (comment size/quantity)  -AB      Bandaging Comments  CompriFoam 12cmX2.5mX0.4cm X1 added per LE, Comprilan 53woV5m X1 added per LE  -AB      Bandaging Technique  circumferential/spiral;moderate compression  -AB      Compression/Skin Care Comments  compression pump to abdomen and BLE's  -AB        User Key  (r) = Recorded By, (t) = Taken By, (c) = Cosigned By     Initials Name Provider Type    AB RigoChely, OT Occupational Therapist                  OT Assessment/Plan     Row Name 07/15/20 1620          OT Plan    OT Plan Comments  Pt. will benefit from continued skilled OT services to address ongoing lymphedema needs 3x per week for 90 days.   -AB       User Key  (r) = Recorded By, (t) = Taken By, (c) = Cosigned By    Initials Name Provider Type    AB Sierra Chelyjuaquin Corrigan, OT Occupational Therapist                OT Goals     Row Name 07/15/20 1600          OT Short Term Goals    STG Date to Achieve  08/16/20  -AB     STG 1  Pt./caregiver will be familiar with precautions, skin care, and self management of lymphedema.   -AB     STG 1 Progress  Ongoing  -AB     STG 2  Pt. will decrease overall edema to moderate-severe to decrease risk of infection.   -AB     STG 2 Progress  Ongoing  -AB     STG 3  Pt. will reduce overall girth by 5cm to reduce risk of infection.   -AB     STG 3 Progress  Ongoing  -AB     STG 4  Pt. will be familiar with HEP to assist with improved lymphatic flow.   -AB     STG 4 Progress  Ongoing  -AB     STG 5  Pt./caregiver will be familiar with self care instructions for home MLD for volume reduction.   -AB     STG 5 Progress  Ongoing  -AB        Long Term Goals    LTG Date to Achieve  09/16/20  -AB     LTG 1  Pt./caregiver will be independent with short stretch compression bandaging for volume reduction  -AB     LTG 1 Progress  Ongoing  -AB     LTG 2  Pt. will decrease edema to moderate for decreased risk of infection.   -AB     LTG 2 Progress  Ongoing  -AB     LTG 3  Pt. will reduce overall girth by 10cm to reduce risk of infection.   -AB     LTG 3 Progress  Ongoing  -AB     LTG 4  Pt./caregiver will be independent with donning/doffing compression garment.   -AB     LTG 4 Progress  Ongoing  -AB     LTG 5  Pt./caregiver will be independent with lymphedema management and HEP.   -AB     LTG 5 Progress  Ongoing  -AB        Time Calculation     OT Goal Re-Cert Due Date  09/16/20  -AB       User Key  (r) = Recorded By, (t) = Taken By, (c) = Cosigned By    Initials Name Provider Type    Chely Mckenna OT Occupational Therapist          Therapy Education  Given: HEP, Symptoms/condition management, Edema management, Bandaging/dressing change  Program: Reinforced  How Provided: Verbal, Demonstration  Provided to: Patient  Level of Understanding: Verbalized                Time Calculation:   OT Start Time: 0800  OT Stop Time: 0955  OT Time Calculation (min): 115 min  OT Non-Billable Time (min): 25 min  Total Timed Code Minutes- OT: 115 minute(s)     Therapy Charges for Today     Code Description Service Date Service Provider Modifiers Qty    36698865704 HC OT VASOPNEUMAT DEVIC 1 OR MORE AREAS 7/15/2020 Chely Sierra OT GO 1    58315662950 HC OT MANUAL THERAPY EA 15 MIN 7/15/2020 Chely Sierra OT GO 4    98107638474 HC OT LYMPHEDEMA MANAGEMENT-15 MIN 7/15/2020 Chely Sierra OT  1    48039117611 HC OT SELF CARE/MGMT/TRAIN EA 15 MIN 7/15/2020 Chely Sierra OT GO 2                      Chely Sierra OT  7/15/2020

## 2020-07-17 ENCOUNTER — HOSPITAL ENCOUNTER (OUTPATIENT)
Dept: OCCUPATIONAL THERAPY | Facility: HOSPITAL | Age: 55
Setting detail: THERAPIES SERIES
Discharge: HOME | End: 2020-07-17

## 2020-07-17 DIAGNOSIS — R60.0 BILATERAL LOWER EXTREMITY EDEMA: Primary | ICD-10-CM

## 2020-07-17 DIAGNOSIS — I89.0 LYMPHEDEMA: ICD-10-CM

## 2020-07-17 PROCEDURE — 97535 SELF CARE MNGMENT TRAINING: CPT

## 2020-07-17 PROCEDURE — 97139 UNLISTED THERAPEUTIC PX: CPT

## 2020-07-17 PROCEDURE — 97140 MANUAL THERAPY 1/> REGIONS: CPT

## 2020-07-17 PROCEDURE — 97016 VASOPNEUMATIC DEVICE THERAPY: CPT

## 2020-07-17 NOTE — THERAPY TREATMENT NOTE
Outpatient Occupational Therapy Lymphedema Treatment Note   [facility]     Patient Name: El Nielsen  : 1965  MRN: [id number]  Today's Date: 2020      Visit Date: 2020    There is no problem list on file for this patient.       Past Medical History:   Diagnosis Date   • Arthritis    • Elevated cholesterol    • GERD (gastroesophageal reflux disease)    • Hypertension    • Sleep apnea         Past Surgical History:   Procedure Laterality Date   • EYE SURGERY     • VASCULAR SURGERY           Visit Dx:      ICD-10-CM ICD-9-CM   1. Bilateral lower extremity edema R60.0 782.3   2. Lymphedema I89.0 457.1       Lymphedema     Row Name 20 1500             Subjective Pain    Able to rate subjective pain?  yes  -AB      Pre-Treatment Pain Level  6  -AB      Subjective Pain Comment  BLE's  -AB         Subjective Comments    Subjective Comments  Pt. reports that he leaned up against a  and cut his L calf area.   -AB         Lymphedema Assessment    Lymphedema Classification  RLE:;LLE:;stage 2 (Spontaneously Irreversible);primary abdomen  -AB      Infections or Cellulitis?  no  -AB         Posture/Observations    Posture- WNL  Posture is WNL  -AB         Lymphedema Edema Assessment    Ptting Edema Category  By severity  -AB      Pitting Edema  Severe  -AB         Skin Changes/Observations    Location/Assessment  Lower Extremity  -AB      Lower Extremity Conditions  bilateral:;dry;hairless;crust;scaly;fragile  -AB      Lower Extremity Color/Pigment  bilateral:;red;blanchable;hyperpigmented;brawny;fibrosis  -AB      Skin Observations Comment  new open area at LLE calf w/ slight drainage during pumping, slight drainage noted to LLE shin wound as well this date  -AB         Lymphedema Sensation    Lymphedema Sensation Reports  RLE:;LLE:;numbness;tingling  -AB      Lymphedema Sensation Tests  light touch  -AB      Lymphedema Light Touch  RLE:;LLE:;severe impairment  -AB         Lymphedema  Measurements    Measurement Type(s)  Quick Girth  -AB      Quick Girth Areas  Lower extremities  -AB         LLE Quick Girth (cm)    Met-heads  32.4 cm  -AB      Mid foot  34.5 cm  -AB      Smallest ankle  40 cm  -AB      Largest calf  58.4 cm  -AB      Tib tuberosity  53.3 cm  -AB      Mid patella  59.7 cm  -AB      Distal thigh  68 cm  -AB      Proximal thigh  74.3 cm  -AB      Other 1  41.7 cm  -AB      Other 2  49.2 cm  -AB         RLE Quick Girth (cm)    Met-heads  32.5 cm  -AB      Mid foot  36.9 cm  -AB      Smallest ankle  43.3 cm  -AB      Largest calf  63.5 cm  -AB      Tib tuberosity  55 cm  -AB      Mid patella  57.3 cm  -AB      Distal thigh  66.7 cm  -AB      Proximal thigh  75.2 cm  -AB      Other 1  45.7 cm  -AB      Other 2  55 cm  -AB      RLE Quick Girth Total  531.1  -AB         Manual Lymphatic Drainage    Manual Lymphatic Drainage  initial sequence;extremity treatment;opened regional lymph nodes  -AB      Initial Sequence  abdomen  -AB      Opened Regional Lymph Nodes  axillary;inguinal  -AB      Axillary  right;left  -AB      Inguinal  right;left  -AB      Extremity Treatment  MLD to full limb  -AB      MLD to Full Limb  BLE's  -AB      Manual Therapy  MLD to BLE's, abdomen, axillary, inguinals  -AB         Compression/Skin Care    Compression/Skin Care  skin care;wrapping location;bandaging  -AB      Skin Care  washed/dried;lotion applied  -AB      Wrapping Location  lower extremity  -AB      Wrapping Location LE  bilateral: base of toes to mid thigh  -AB      Bandage Layers  cotton elastic stocking- single layer (comment size);soft foam- 1/4 inch;short-stretch bandages (comment size/quantity)  -AB      Bandaging Comments  large komprex kidney to each ankle  -AB      Bandaging Technique  circumferential/spiral;moderate compression  -AB      Compression/Skin Care Comments  compression pump to abdomen and BLE's  -AB        User Key  (r) = Recorded By, (t) = Taken By, (c) = Cosigned By     Initials Name Provider Type    AB Chely Sierra OT Occupational Therapist                                Therapy Education  Given: HEP, Symptoms/condition management, Edema management, Bandaging/dressing change, Other (comment)(diet, exercises)  Program: Reinforced, New  How Provided: Verbal, Demonstration  Provided to: Patient  Level of Understanding: Verbalized                Time Calculation:   OT Start Time: 1500  OT Stop Time: 1655  OT Time Calculation (min): 115 min  OT Non-Billable Time (min): 25 min  Total Timed Code Minutes- OT: 115 minute(s)     Therapy Charges for Today     Code Description Service Date Service Provider Modifiers Qty    19694209589 HC OT VASOPNEUMAT DEVIC 1 OR MORE AREAS 7/17/2020 Chely Sierra OT GO 1    10530287106 HC OT MANUAL THERAPY EA 15 MIN 7/17/2020 Chely Sierra OT GO 4    76642992933 HC OT LYMPHEDEMA MANAGEMENT-15 MIN 7/17/2020 Chely Sierra OT  1    51608396419 HC OT SELF CARE/MGMT/TRAIN EA 15 MIN 7/17/2020 Chely Sierra OT GO 2                      Chely Sierra OT  7/17/2020

## 2020-07-27 ENCOUNTER — HOSPITAL ENCOUNTER (OUTPATIENT)
Dept: OCCUPATIONAL THERAPY | Facility: HOSPITAL | Age: 55
Setting detail: THERAPIES SERIES
Discharge: HOME | End: 2020-07-27

## 2020-07-27 DIAGNOSIS — R60.0 BILATERAL LOWER EXTREMITY EDEMA: Primary | ICD-10-CM

## 2020-07-27 DIAGNOSIS — I89.0 LYMPHEDEMA: ICD-10-CM

## 2020-07-27 PROCEDURE — 97016 VASOPNEUMATIC DEVICE THERAPY: CPT

## 2020-07-27 PROCEDURE — 97139 UNLISTED THERAPEUTIC PX: CPT

## 2020-07-27 PROCEDURE — 97140 MANUAL THERAPY 1/> REGIONS: CPT

## 2020-07-27 NOTE — THERAPY TREATMENT NOTE
"Outpatient Occupational Therapy Lymphedema Treatment Note   [facility]     Patient Name: El Nielsen  : 1965  MRN: [id number]  Today's Date: 2020      Visit Date: 2020    There is no problem list on file for this patient.       Past Medical History:   Diagnosis Date   • Arthritis    • Elevated cholesterol    • GERD (gastroesophageal reflux disease)    • Hypertension    • Sleep apnea         Past Surgical History:   Procedure Laterality Date   • EYE SURGERY     • VASCULAR SURGERY           Visit Dx:      ICD-10-CM ICD-9-CM   1. Bilateral lower extremity edema R60.0 782.3   2. Lymphedema I89.0 457.1       Lymphedema     Row Name 20 0800             Subjective Pain    Able to rate subjective pain?  yes  -AB      Pre-Treatment Pain Level  5  -AB      Subjective Pain Comment  \"Pain is pretty mild\"  -AB         Subjective Comments    Subjective Comments  Pt. reports that he pumped every day since last visit.   -AB         Lymphedema Assessment    Lymphedema Classification  RLE:;LLE:;stage 2 (Spontaneously Irreversible);primary abdomen  -AB      Infections or Cellulitis?  no  -AB         Posture/Observations    Posture- WNL  Posture is WNL  -AB         Lymphedema Edema Assessment    Ptting Edema Category  By severity  -AB      Pitting Edema  Severe  -AB      Edema Assessment Comment  decreased overall girth noted in LLE   -AB         Skin Changes/Observations    Location/Assessment  Lower Extremity  -AB      Lower Extremity Conditions  bilateral:;dry;hairless;crust;scaly;fragile  -AB      Lower Extremity Color/Pigment  bilateral:;red;blanchable;hyperpigmented;brawny;fibrosis  -AB      Skin Observations Comment  no open areas noted today  -AB         Lymphedema Sensation    Lymphedema Sensation Reports  RLE:;LLE:;numbness;tingling  -AB      Lymphedema Sensation Tests  light touch  -AB      Lymphedema Light Touch  RLE:;LLE:;severe impairment  -AB         Lymphedema Measurements    Measurement " Type(s)  Quick Girth  -AB      Quick Girth Areas  Lower extremities  -AB         LLE Quick Girth (cm)    Met-heads  32.7 cm  -AB      Mid foot  34.4 cm  -AB      Smallest ankle  40.5 cm  -AB      Largest calf  57.6 cm  -AB      Tib tuberosity  51 cm  -AB      Mid patella  54.3 cm  -AB      Distal thigh  69 cm  -AB      Proximal thigh  76.7 cm  -AB      Other 1  40.6 cm  -AB      Other 2  47.9 cm  -AB         RLE Quick Girth (cm)    Met-heads  33.6 cm  -AB      Mid foot  36 cm  -AB      Smallest ankle  42.4 cm  -AB      Largest calf  60.3 cm  -AB      Tib tuberosity  53.5 cm  -AB      Mid patella  57.3 cm  -AB      Distal thigh  71.2 cm  -AB      Proximal thigh  77.7 cm  -AB      Other 1  44.6 cm  -AB      Other 2  54 cm  -AB      RLE Quick Girth Total  530.6  -AB         Manual Lymphatic Drainage    Manual Lymphatic Drainage  initial sequence;extremity treatment;opened regional lymph nodes  -AB      Initial Sequence  abdomen  -AB      Opened Regional Lymph Nodes  axillary;inguinal  -AB      Axillary  right;left  -AB      Inguinal  right;left  -AB      Extremity Treatment  MLD to full limb  -AB      MLD to Full Limb  BLE's  -AB      Manual Therapy  MLD to BLE's, abdomen, axillary, inguinals  -AB         Compression/Skin Care    Compression/Skin Care  skin care;wrapping location;bandaging  -AB      Skin Care  washed/dried;lotion applied  -AB      Wrapping Location  lower extremity  -AB      Wrapping Location LE  bilateral: base of toes to knee  -AB      Bandage Layers  cotton elastic stocking- single layer (comment size);soft foam- 1/4 inch;short-stretch bandages (comment size/quantity)  -AB      Bandaging Comments  large komprex kidney to each ankle  -AB      Bandaging Technique  circumferential/spiral;moderate compression  -AB      Compression/Skin Care Comments  compression pump X30 mins to BLE's, X16 mins to abdomen  -AB        User Key  (r) = Recorded By, (t) = Taken By, (c) = Cosigned By    Initials Name  Provider Type    AB Chely Sierra OT Occupational Therapist                                Therapy Education  Given: HEP, Edema management, Bandaging/dressing change, Symptoms/condition management  Program: Reinforced  How Provided: Verbal, Demonstration  Provided to: Patient  Level of Understanding: Verbalized                Time Calculation:   OT Start Time: 0810  OT Stop Time: 0940  OT Time Calculation (min): 90 min  OT Non-Billable Time (min): 25 min  Total Timed Code Minutes- OT: 90 minute(s)     Therapy Charges for Today     Code Description Service Date Service Provider Modifiers Qty    00399838893 HC OT VASOPNEUMAT DEVIC 1 OR MORE AREAS 7/27/2020 Chely Sierra OT GO 1    39988526955 HC OT MANUAL THERAPY EA 15 MIN 7/27/2020 Chely Sierra OT GO 4    22782515260 HC OT LYMPHEDEMA MANAGEMENT-15 MIN 7/27/2020 Chely Sierra OT  1                      Chely Sierra OT  7/27/2020

## 2020-07-29 ENCOUNTER — APPOINTMENT (OUTPATIENT)
Dept: OCCUPATIONAL THERAPY | Facility: HOSPITAL | Age: 55
End: 2020-07-29

## 2020-07-31 ENCOUNTER — HOSPITAL ENCOUNTER (OUTPATIENT)
Dept: OCCUPATIONAL THERAPY | Facility: HOSPITAL | Age: 55
Setting detail: THERAPIES SERIES
Discharge: HOME | End: 2020-07-31

## 2020-07-31 DIAGNOSIS — Z98.890 H/O VASCULAR SURGERY: ICD-10-CM

## 2020-07-31 DIAGNOSIS — R60.0 BILATERAL LOWER EXTREMITY EDEMA: Primary | ICD-10-CM

## 2020-07-31 DIAGNOSIS — I89.0 LYMPHEDEMA: ICD-10-CM

## 2020-07-31 PROCEDURE — 97140 MANUAL THERAPY 1/> REGIONS: CPT

## 2020-07-31 PROCEDURE — 97139 UNLISTED THERAPEUTIC PX: CPT

## 2020-07-31 PROCEDURE — 97016 VASOPNEUMATIC DEVICE THERAPY: CPT

## 2020-07-31 PROCEDURE — 97535 SELF CARE MNGMENT TRAINING: CPT

## 2020-08-03 ENCOUNTER — HOSPITAL ENCOUNTER (OUTPATIENT)
Dept: OCCUPATIONAL THERAPY | Facility: HOSPITAL | Age: 55
Setting detail: THERAPIES SERIES
Discharge: HOME | End: 2020-08-03

## 2020-08-03 DIAGNOSIS — Z98.890 H/O VASCULAR SURGERY: ICD-10-CM

## 2020-08-03 DIAGNOSIS — I89.0 LYMPHEDEMA: ICD-10-CM

## 2020-08-03 DIAGNOSIS — R60.0 BILATERAL LOWER EXTREMITY EDEMA: Primary | ICD-10-CM

## 2020-08-03 PROCEDURE — 97016 VASOPNEUMATIC DEVICE THERAPY: CPT

## 2020-08-03 PROCEDURE — 97140 MANUAL THERAPY 1/> REGIONS: CPT

## 2020-08-03 PROCEDURE — 97139 UNLISTED THERAPEUTIC PX: CPT

## 2020-08-03 PROCEDURE — 97535 SELF CARE MNGMENT TRAINING: CPT

## 2020-08-03 NOTE — THERAPY TREATMENT NOTE
"Outpatient Occupational Therapy Lymphedema Treatment Note   [place]     Patient Name: El Nielsen  : 1965  MRN: [id number]  Today's Date: 8/3/2020      Visit Date: 2020    There is no problem list on file for this patient.       Past Medical History:   Diagnosis Date   • Arthritis    • Elevated cholesterol    • GERD (gastroesophageal reflux disease)    • Hypertension    • Sleep apnea         Past Surgical History:   Procedure Laterality Date   • EYE SURGERY     • VASCULAR SURGERY           Visit Dx:      ICD-10-CM ICD-9-CM   1. Bilateral lower extremity edema R60.0 782.3   2. Lymphedema I89.0 457.1   3. H/O vascular surgery Z98.890 V45.89       Lymphedema     Row Name 20 0800             Subjective Pain    Able to rate subjective pain?  yes  -AB      Pre-Treatment Pain Level  6  -AB      Subjective Pain Comment  pain is \"tolerable\"  -AB         Subjective Comments    Subjective Comments  Pt. presents to therapy w/ kinesiotape still donned to bilateral ankles. Pt. reports that he has been doing exercises, massage, and using basic pump regularly at home. However, he feels as though his stomach continues to swell.   -AB         Lymphedema Assessment    Lymphedema Classification  RLE:;LLE:;stage 2 (Spontaneously Irreversible);primary abdomen  -AB      Infections or Cellulitis?  no  -AB         Posture/Observations    Posture- WNL  Posture is WNL  -AB         Lymphedema Edema Assessment    Ptting Edema Category  By severity  -AB      Pitting Edema  Severe  -AB      Edema Assessment Comment  decreased overall girth noted to BLE's  -AB         Skin Changes/Observations    Location/Assessment  Lower Extremity  -AB      Lower Extremity Conditions  bilateral:;dry;hairless;scaly;fragile  -AB      Lower Extremity Color/Pigment  bilateral:;red;blanchable;hyperpigmented;brawny;fibrosis  -AB         Lymphedema Sensation    Lymphedema Sensation Reports  RLE:;LLE:;numbness;tingling  -AB      Lymphedema " Sensation Tests  light touch  -AB      Lymphedema Light Touch  RLE:;LLE:;severe impairment  -AB         Lymphedema Measurements    Measurement Type(s)  Quick Girth  -AB      Quick Girth Areas  Lower extremities  -AB         LLE Quick Girth (cm)    Met-heads  32.4 cm  -AB      Mid foot  35.1 cm  -AB      Smallest ankle  39.4 cm  -AB      Largest calf  56.1 cm  -AB      Tib tuberosity  49.9 cm  -AB      Mid patella  60.4 cm  -AB      Distal thigh  68.8 cm  -AB      Proximal thigh  77 cm  -AB      Other 1  41.1 cm  -AB      Other 2  46.5 cm  -AB         RLE Quick Girth (cm)    Met-heads  33.3 cm  -AB      Mid foot  36.6 cm  -AB      Smallest ankle  42.6 cm  -AB      Largest calf  58.3 cm  -AB      Tib tuberosity  52 cm  -AB      Mid patella  55.5 cm  -AB      Distal thigh  66 cm  -AB      Proximal thigh  77.5 cm  -AB      Other 1  43.7 cm  -AB      Other 2  50.9 cm  -AB      RLE Quick Girth Total  516.4  -AB         Manual Lymphatic Drainage    Manual Lymphatic Drainage  initial sequence;extremity treatment;opened regional lymph nodes  -AB      Initial Sequence  abdomen  -AB      Opened Regional Lymph Nodes  axillary;inguinal  -AB      Axillary  right;left  -AB      Inguinal  right;left  -AB      Extremity Treatment  MLD to full limb  -AB      MLD to Full Limb  BLE's  -AB      Manual Therapy  MLD to BLE's, abdomen, axillary, inguinals  -AB         Compression/Skin Care    Compression/Skin Care  skin care;wrapping location;bandaging  -AB      Skin Care  washed/dried;lotion applied  -AB      Wrapping Location  lower extremity  -AB      Wrapping Location LE  bilateral: base of toes to above knee  -AB      Bandage Layers  cotton elastic stocking- single layer (comment size);soft foam- 1/4 inch;short-stretch bandages (comment size/quantity)  -AB      Bandaging Technique  circumferential/spiral;figure-eight;moderate compression  -AB      Compression/Skin Care Comments  compression pump to abdomen and BLE's  -AB        User  Key  (r) = Recorded By, (t) = Taken By, (c) = Cosigned By    Initials Name Provider Type    AB Chely Sierra OT Occupational Therapist                                Therapy Education  Given: HEP, Symptoms/condition management, Edema management, Bandaging/dressing change  Program: Reinforced  How Provided: Verbal, Demonstration  Provided to: Patient  Level of Understanding: Verbalized                Time Calculation:   OT Start Time: 0800  OT Stop Time: 0945  OT Time Calculation (min): 105 min  OT Non-Billable Time (min): 25 min  Total Timed Code Minutes- OT: 105 minute(s)     Therapy Charges for Today     Code Description Service Date Service Provider Modifiers Qty    96061175983 HC OT VASOPNEUMAT DEVIC 1 OR MORE AREAS 8/3/2020 Chely Sierra, OT GO 1    71374336431 HC OT MANUAL THERAPY EA 15 MIN 8/3/2020 Chely Sierra OT GO 4    93244231650 HC OT LYMPHEDEMA MANAGEMENT-15 MIN 8/3/2020 Chely Sierra OT  1    36706819297 HC OT SELF CARE/MGMT/TRAIN EA 15 MIN 8/3/2020 Chely Sierra OT GO 1                      Chely Sierra OT  8/3/2020

## 2020-08-05 ENCOUNTER — HOSPITAL ENCOUNTER (OUTPATIENT)
Dept: OCCUPATIONAL THERAPY | Facility: HOSPITAL | Age: 55
Setting detail: THERAPIES SERIES
Discharge: HOME | End: 2020-08-05

## 2020-08-05 DIAGNOSIS — R60.0 BILATERAL LOWER EXTREMITY EDEMA: Primary | ICD-10-CM

## 2020-08-05 DIAGNOSIS — Z98.890 H/O VASCULAR SURGERY: ICD-10-CM

## 2020-08-05 DIAGNOSIS — I89.0 LYMPHEDEMA: ICD-10-CM

## 2020-08-05 PROCEDURE — 97016 VASOPNEUMATIC DEVICE THERAPY: CPT

## 2020-08-05 PROCEDURE — 97140 MANUAL THERAPY 1/> REGIONS: CPT

## 2020-08-05 PROCEDURE — 97535 SELF CARE MNGMENT TRAINING: CPT

## 2020-08-05 PROCEDURE — 97139 UNLISTED THERAPEUTIC PX: CPT

## 2020-08-05 NOTE — THERAPY TREATMENT NOTE
"Outpatient Occupational Therapy Lymphedema Treatment Note   [place]     Patient Name: El Nielsen  : 1965  MRN: [id number]  Today's Date: 2020      Visit Date: 2020    There is no problem list on file for this patient.       Past Medical History:   Diagnosis Date   • Arthritis    • Elevated cholesterol    • GERD (gastroesophageal reflux disease)    • Hypertension    • Sleep apnea         Past Surgical History:   Procedure Laterality Date   • EYE SURGERY     • VASCULAR SURGERY           Visit Dx:      ICD-10-CM ICD-9-CM   1. Bilateral lower extremity edema R60.0 782.3   2. Lymphedema I89.0 457.1   3. H/O vascular surgery Z98.890 V45.89       Lymphedema     Row Name 20 0800             Subjective Pain    Able to rate subjective pain?  yes  -AB      Pre-Treatment Pain Level  4  -AB      Subjective Pain Comment  \"I feel pretty good today\"  -AB         Subjective Comments    Subjective Comments  Pt. reports that his BLE's feel better, and are less painful this date. He states that when he took wraps off that he was excited because his ankles were smaller.   -AB         Lymphedema Assessment    Lymphedema Classification  RLE:;LLE:;stage 2 (Spontaneously Irreversible);primary abdomen  -AB      Infections or Cellulitis?  no  -AB         Posture/Observations    Posture- WNL  Posture is WNL  -AB         Lymphedema Edema Assessment    Ptting Edema Category  By severity  -AB      Pitting Edema  Severe  -AB         Skin Changes/Observations    Location/Assessment  Lower Extremity  -AB      Lower Extremity Conditions  bilateral:;dry;hairless;scaly;fragile  -AB      Lower Extremity Color/Pigment  bilateral:;red;blanchable;hyperpigmented;brawny;fibrosis  -AB         Lymphedema Sensation    Lymphedema Sensation Reports  RLE:;LLE:;numbness;tingling  -AB      Lymphedema Sensation Tests  light touch  -AB      Lymphedema Light Touch  RLE:;LLE:;severe impairment  -AB         Lymphedema Measurements    " Measurement Type(s)  Quick Girth  -AB      Quick Girth Areas  Lower extremities  -AB         LLE Quick Girth (cm)    Met-heads  32.8 cm  -AB      Mid foot  32.9 cm  -AB      Smallest ankle  38.8 cm  -AB      Largest calf  56.7 cm  -AB      Tib tuberosity  49.9 cm  -AB      Mid patella  61.1 cm  -AB      Distal thigh  69.9 cm  -AB      Proximal thigh  74.4 cm  -AB      Other 1  40.1 cm  -AB      Other 2  48.1 cm  -AB         RLE Quick Girth (cm)    Met-heads  32.4 cm  -AB      Mid foot  35.4 cm  -AB      Smallest ankle  42 cm  -AB      Largest calf  59.1 cm  -AB      Tib tuberosity  51.8 cm  -AB      Mid patella  55.9 cm  -AB      Distal thigh  68.2 cm  -AB      Other 1  43.7 cm  -AB      Other 2  51.7 cm  -AB      RLE Quick Girth Total  440.2  -AB         Manual Lymphatic Drainage    Manual Lymphatic Drainage  initial sequence;extremity treatment;opened regional lymph nodes  -AB      Initial Sequence  abdomen  -AB      Opened Regional Lymph Nodes  axillary;inguinal  -AB      Axillary  right;left  -AB      Inguinal  right;left  -AB      Extremity Treatment  MLD to full limb  -AB      MLD to Full Limb  BLE's  -AB      Manual Therapy  MLD to BLEs, abdomen, axillary, inguinals  -AB         Compression/Skin Care    Compression/Skin Care  skin care;wrapping location;bandaging  -AB      Skin Care  washed/dried;lotion applied  -AB      Wrapping Location  lower extremity  -AB      Wrapping Location LE  bilateral: base of toes to below knee per pt. request  -AB      Bandage Layers  cotton elastic stocking- single layer (comment size);soft foam- 1/4 inch;short-stretch bandages (comment size/quantity)  -AB      Bandaging Comments  kinesiotape to bilateral ankles and dorsum of feet  -AB      Bandaging Technique  circumferential/spiral;figure-eight;moderate compression  -AB      Compression/Skin Care Comments  compression pump to abdomen and BLE's  -AB        User Key  (r) = Recorded By, (t) = Taken By, (c) = Cosigned By     Initials Name Provider Type    AB Chely Sierra OT Occupational Therapist                                Therapy Education  Given: HEP, Symptoms/condition management, Edema management, Bandaging/dressing change  Program: Reinforced  How Provided: Demonstration, Verbal  Provided to: Patient  Level of Understanding: Verbalized                Time Calculation:   OT Start Time: 0800  OT Stop Time: 0955  OT Time Calculation (min): 115 min  OT Non-Billable Time (min): 25 min  Total Timed Code Minutes- OT: 115 minute(s)     Therapy Charges for Today     Code Description Service Date Service Provider Modifiers Qty    44953300350 HC OT VASOPNEUMAT DEVIC 1 OR MORE AREAS 8/5/2020 Chely Sierra OT GO 1    32806893522 HC OT MANUAL THERAPY EA 15 MIN 8/5/2020 Chely Sierra OT GO 4    74442094527 HC OT LYMPHEDEMA MANAGEMENT-15 MIN 8/5/2020 Chely Sierra OT  1    33841895439 HC OT SELF CARE/MGMT/TRAIN EA 15 MIN 8/5/2020 Chely Sierra OT GO 2                      Chely Sierra OT  8/5/2020

## 2020-08-07 ENCOUNTER — APPOINTMENT (OUTPATIENT)
Dept: OCCUPATIONAL THERAPY | Facility: HOSPITAL | Age: 55
End: 2020-08-07

## 2020-08-10 ENCOUNTER — HOSPITAL ENCOUNTER (OUTPATIENT)
Dept: OCCUPATIONAL THERAPY | Facility: HOSPITAL | Age: 55
Setting detail: THERAPIES SERIES
Discharge: HOME | End: 2020-08-10

## 2020-08-10 DIAGNOSIS — R60.0 BILATERAL LOWER EXTREMITY EDEMA: Primary | ICD-10-CM

## 2020-08-10 DIAGNOSIS — Z98.890 H/O VASCULAR SURGERY: ICD-10-CM

## 2020-08-10 DIAGNOSIS — I89.0 LYMPHEDEMA: ICD-10-CM

## 2020-08-10 PROCEDURE — 97139 UNLISTED THERAPEUTIC PX: CPT

## 2020-08-10 PROCEDURE — 97140 MANUAL THERAPY 1/> REGIONS: CPT

## 2020-08-10 PROCEDURE — 97535 SELF CARE MNGMENT TRAINING: CPT

## 2020-08-10 PROCEDURE — 97016 VASOPNEUMATIC DEVICE THERAPY: CPT

## 2020-08-10 NOTE — THERAPY TREATMENT NOTE
Outpatient Occupational Therapy Lymphedema Treatment Note   [place]     Patient Name: El Nielsen  : 1965  MRN: [id number]  Today's Date: 8/10/2020      Visit Date: 08/10/2020    There is no problem list on file for this patient.       Past Medical History:   Diagnosis Date   • Arthritis    • Elevated cholesterol    • GERD (gastroesophageal reflux disease)    • Hypertension    • Sleep apnea         Past Surgical History:   Procedure Laterality Date   • EYE SURGERY     • VASCULAR SURGERY           Visit Dx:      ICD-10-CM ICD-9-CM   1. Bilateral lower extremity edema R60.0 782.3   2. Lymphedema I89.0 457.1   3. H/O vascular surgery Z98.890 V45.89       Lymphedema     Row Name 08/10/20 0800             Subjective Pain    Able to rate subjective pain?  yes  -AB      Pre-Treatment Pain Level  5  -AB      Subjective Pain Comment  BLE's  -AB         Subjective Comments    Subjective Comments  Pt. presents to treatment w/ kinesiotape still donned to BLE ankles  -AB         Lymphedema Assessment    Lymphedema Classification  RLE:;LLE:;stage 2 (Spontaneously Irreversible);primary abdomen  -AB      Infections or Cellulitis?  no  -AB         Posture/Observations    Posture- WNL  Posture is WNL  -AB         Lymphedema Edema Assessment    Ptting Edema Category  By severity  -AB      Pitting Edema  Severe  -AB      Edema Assessment Comment  both ankles and feet are more pliable this date.   -AB         Skin Changes/Observations    Location/Assessment  Lower Extremity  -AB      Lower Extremity Conditions  bilateral:;dry;hairless;scaly;fragile  -AB      Lower Extremity Color/Pigment  bilateral:;red;blanchable;hyperpigmented;brawny;fibrosis  -AB         Lymphedema Sensation    Lymphedema Sensation Reports  RLE:;LLE:;numbness;tingling  -AB      Lymphedema Sensation Tests  light touch  -AB      Lymphedema Light Touch  RLE:;LLE:;severe impairment  -AB         Lymphedema Measurements    Measurement Type(s)  Quick Girth   -AB      Quick Girth Areas  Lower extremities  -AB         LLE Quick Girth (cm)    Met-heads  31.2 cm  -AB      Mid foot  33.4 cm  -AB      Smallest ankle  38.1 cm  -AB      Largest calf  57.6 cm  -AB      Tib tuberosity  50.6 cm  -AB      Mid patella  57.4 cm  -AB      Distal thigh  70 cm  -AB      Other 1  39.6 cm  -AB      Other 2  47.3 cm  -AB         RLE Quick Girth (cm)    Met-heads  33 cm  -AB      Mid foot  36 cm  -AB      Smallest ankle  41.4 cm  -AB      Largest calf  60.2 cm  -AB      Tib tuberosity  52.9 cm  -AB      Mid patella  57.4 cm  -AB      Distal thigh  67.6 cm  -AB      Proximal thigh  78.1 cm  -AB      Other 1  44.4 cm  -AB      Other 2  51.9 cm  -AB      RLE Quick Girth Total  522.9  -AB         Manual Lymphatic Drainage    Manual Lymphatic Drainage  initial sequence;extremity treatment;opened regional lymph nodes  -AB      Initial Sequence  abdomen  -AB      Opened Regional Lymph Nodes  axillary;inguinal  -AB      Axillary  right;left  -AB      Inguinal  right;left  -AB      Extremity Treatment  MLD to full limb  -AB      MLD to Full Limb  BLE's  -AB      Manual Therapy  MLD to BLE's, abdomen, axillary, inguinals  -AB         Compression/Skin Care    Compression/Skin Care  skin care;wrapping location;bandaging  -AB      Skin Care  washed/dried lotion not applied d/t kinesiotape  -AB      Wrapping Location  lower extremity  -AB      Wrapping Location LE  bilateral: base of toes to below knee per pt. request  -AB      Bandage Layers  cotton elastic stocking- single layer (comment size);soft foam- 1/4 inch;short-stretch bandages (comment size/quantity)  -AB      Bandaging Comments  kinesiotape to bilateral ankles and dorsum of feet  -AB      Bandaging Technique  circumferential/spiral;figure-eight;moderate compression  -AB      Compression/Skin Care Comments  compression pump to abdomen and BLE's  -AB        User Key  (r) = Recorded By, (t) = Taken By, (c) = Cosigned By    Initials Name Provider  Type    AB Chely Sierra OT Occupational Therapist                                Therapy Education  Given: HEP, Symptoms/condition management, Edema management, Bandaging/dressing change  Program: Reinforced  How Provided: Verbal, Demonstration  Provided to: Patient  Level of Understanding: Verbalized                Time Calculation:   OT Start Time: 0805  OT Stop Time: 1000  OT Time Calculation (min): 115 min  OT Non-Billable Time (min): 25 min  Total Timed Code Minutes- OT: 115 minute(s)     Therapy Charges for Today     Code Description Service Date Service Provider Modifiers Qty    45272859850 HC OT VASOPNEUMAT DEVIC 1 OR MORE AREAS 8/10/2020 Chely Sierra, OT GO, KX 1    22506021903 HC OT LYMPHEDEMA MANAGEMENT-15 MIN 8/10/2020 Chely Sierra, OT KX 1    88976959970 HC OT SELF CARE/MGMT/TRAIN EA 15 MIN 8/10/2020 Chely Sierra OT GO, KX 2    04518907267 HC OT MANUAL THERAPY EA 15 MIN 8/10/2020 Chely Sierra OT GO, KX 4                      Chely Sierra OT  8/10/2020

## 2020-08-12 ENCOUNTER — APPOINTMENT (OUTPATIENT)
Dept: OCCUPATIONAL THERAPY | Facility: HOSPITAL | Age: 55
End: 2020-08-12

## 2020-08-14 ENCOUNTER — APPOINTMENT (OUTPATIENT)
Dept: OCCUPATIONAL THERAPY | Facility: HOSPITAL | Age: 55
End: 2020-08-14

## 2020-08-17 ENCOUNTER — APPOINTMENT (OUTPATIENT)
Dept: OCCUPATIONAL THERAPY | Facility: HOSPITAL | Age: 55
End: 2020-08-17

## 2020-08-19 ENCOUNTER — HOSPITAL ENCOUNTER (OUTPATIENT)
Dept: OCCUPATIONAL THERAPY | Facility: HOSPITAL | Age: 55
Setting detail: THERAPIES SERIES
Discharge: HOME | End: 2020-08-19

## 2020-08-19 DIAGNOSIS — I89.0 LYMPHEDEMA: ICD-10-CM

## 2020-08-19 DIAGNOSIS — Z98.890 H/O VASCULAR SURGERY: ICD-10-CM

## 2020-08-19 DIAGNOSIS — R60.0 BILATERAL LOWER EXTREMITY EDEMA: Primary | ICD-10-CM

## 2020-08-19 PROCEDURE — 97535 SELF CARE MNGMENT TRAINING: CPT

## 2020-08-19 PROCEDURE — 97139 UNLISTED THERAPEUTIC PX: CPT

## 2020-08-19 PROCEDURE — 97140 MANUAL THERAPY 1/> REGIONS: CPT

## 2020-08-19 PROCEDURE — 97016 VASOPNEUMATIC DEVICE THERAPY: CPT

## 2020-08-19 NOTE — THERAPY TREATMENT NOTE
Outpatient Occupational Therapy Lymphedema Treatment Note   [place]     Patient Name: El Nielsen  : 1965  MRN: [id number]  Today's Date: 2020      Visit Date: 2020    There is no problem list on file for this patient.       Past Medical History:   Diagnosis Date   • Arthritis    • Elevated cholesterol    • GERD (gastroesophageal reflux disease)    • Hypertension    • Sleep apnea         Past Surgical History:   Procedure Laterality Date   • EYE SURGERY     • VASCULAR SURGERY           Visit Dx:      ICD-10-CM ICD-9-CM   1. Bilateral lower extremity edema R60.0 782.3   2. H/O vascular surgery Z98.890 V45.89   3. Lymphedema I89.0 457.1       Lymphedema     Row Name 20 0800             Subjective Pain    Able to rate subjective pain?  yes  -AB      Pre-Treatment Pain Level  8  -AB      Subjective Pain Comment  BLE's  -AB         Subjective Comments    Subjective Comments  Pt. reports that he has had family issues which has led to him missing the last few therapy sessions.   -AB         Lymphedema Assessment    Lymphedema Classification  RLE:;LLE:;stage 2 (Spontaneously Irreversible);primary abdomen  -AB      Infections or Cellulitis?  no  -AB         Posture/Observations    Posture- WNL  Posture is WNL  -AB         Lymphedema Edema Assessment    Ptting Edema Category  By severity  -AB      Pitting Edema  Severe  -AB      Edema Assessment Comment  increased swelling to BLE's  -AB         Skin Changes/Observations    Location/Assessment  Lower Extremity  -AB      Lower Extremity Conditions  bilateral:;dry;hairless;scaly;fragile  -AB      Lower Extremity Color/Pigment  bilateral:;red;blanchable;hyperpigmented;brawny;fibrosis  -AB      Skin Observations Comment  pt. reports that he got scratched on LLE and weeping was noted.   -AB         Lymphedema Sensation    Lymphedema Sensation Reports  RLE:;LLE:;numbness;tingling  -AB      Lymphedema Sensation Tests  light touch  -AB      Lymphedema  Light Touch  RLE:;LLE:;severe impairment  -AB         Lymphedema Measurements    Measurement Type(s)  Quick Girth  -AB      Quick Girth Areas  Lower extremities  -AB         LLE Quick Girth (cm)    Met-heads  31.5 cm  -AB      Mid foot  35.1 cm  -AB      Smallest ankle  39.8 cm  -AB      Largest calf  60.5 cm  -AB      Tib tuberosity  54 cm  -AB      Mid patella  59.6 cm  -AB      Distal thigh  70.4 cm  -AB      Other 1  42.5 cm  -AB      Other 2  49 cm  -AB         RLE Quick Girth (cm)    Met-heads  34.7 cm  -AB      Mid foot  37.2 cm  -AB      Smallest ankle  42.8 cm  -AB      Largest calf  63.9 cm  -AB      Tib tuberosity  55.5 cm  -AB      Mid patella  57.6 cm  -AB      Distal thigh  68.3 cm  -AB      Other 1  46.8 cm  -AB      Other 2  53.7 cm  -AB      RLE Quick Girth Total  460.5  -AB         Manual Lymphatic Drainage    Manual Lymphatic Drainage  initial sequence;extremity treatment;opened regional lymph nodes  -AB      Initial Sequence  abdomen  -AB      Opened Regional Lymph Nodes  axillary;inguinal  -AB      Axillary  right;left  -AB      Inguinal  right;left  -AB      Extremity Treatment  MLD to full limb  -AB      MLD to Full Limb  BLE's  -AB      Manual Therapy  MLD to BLE's, abdomen, axillary, inguinals  -AB         Compression/Skin Care    Compression/Skin Care  skin care;wrapping location;bandaging  -AB      Skin Care  washed/dried lotion not applied d/t kinesiotape  -AB      Wrapping Location  lower extremity  -AB      Wrapping Location LE  bilateral: base of toes to below knee per pt. request  -AB      Bandage Layers  cotton elastic stocking- single layer (comment size);soft foam- 1/4 inch;short-stretch bandages (comment size/quantity)  -AB      Bandaging Comments  kinesiotape to bilateral ankles, dorsum of foot, calves to popliteal lnn's  -AB      Bandaging Technique  circumferential/spiral;moderate compression  -AB      Compression/Skin Care Comments  compression pump to abdomen and BLE's  -AB         User Key  (r) = Recorded By, (t) = Taken By, (c) = Cosigned By    Initials Name Provider Type    AB Chely Sierra, OT Occupational Therapist                                Therapy Education  Given: HEP, Symptoms/condition management, Edema management, Bandaging/dressing change  Program: Reinforced  How Provided: Verbal, Demonstration  Provided to: Patient  Level of Understanding: Verbalized                Time Calculation:   OT Start Time: 0820  OT Stop Time: 1035  OT Time Calculation (min): 135 min  OT Non-Billable Time (min): 25 min  Total Timed Code Minutes- OT: 135 minute(s)     Therapy Charges for Today     Code Description Service Date Service Provider Modifiers Qty    98826978276 HC OT VASOPNEUMAT DEVIC 1 OR MORE AREAS 8/19/2020 Chely Sierra OT GO, KX 1    [id number]  OT MANUAL THERAPY EA 15 MIN 8/19/2020 Chely Sierra OT GO, KX 4    50878303609 HC OT LYMPHEDEMA MANAGEMENT-15 MIN 8/19/2020 Chely Sierra, OT KX 1    56873906822 HC OT SELF CARE/MGMT/TRAIN EA 15 MIN 8/19/2020 Chely Sierra OT GO, KX 3                      Chely Sierra OT  8/19/2020

## 2020-08-24 ENCOUNTER — APPOINTMENT (OUTPATIENT)
Dept: OCCUPATIONAL THERAPY | Facility: HOSPITAL | Age: 55
End: 2020-08-24

## 2020-08-26 ENCOUNTER — HOSPITAL ENCOUNTER (OUTPATIENT)
Dept: OCCUPATIONAL THERAPY | Facility: HOSPITAL | Age: 55
Setting detail: THERAPIES SERIES
Discharge: HOME | End: 2020-08-26

## 2020-08-26 DIAGNOSIS — Z98.890 H/O VASCULAR SURGERY: ICD-10-CM

## 2020-08-26 DIAGNOSIS — I89.0 LYMPHEDEMA: ICD-10-CM

## 2020-08-26 DIAGNOSIS — R60.0 BILATERAL LOWER EXTREMITY EDEMA: Primary | ICD-10-CM

## 2020-08-26 PROCEDURE — 97139 UNLISTED THERAPEUTIC PX: CPT

## 2020-08-26 PROCEDURE — 97140 MANUAL THERAPY 1/> REGIONS: CPT

## 2020-08-26 PROCEDURE — 97016 VASOPNEUMATIC DEVICE THERAPY: CPT

## 2020-09-14 ENCOUNTER — APPOINTMENT (OUTPATIENT)
Dept: OCCUPATIONAL THERAPY | Facility: HOSPITAL | Age: 55
End: 2020-09-14

## 2020-09-17 ENCOUNTER — HOSPITAL ENCOUNTER (OUTPATIENT)
Dept: OCCUPATIONAL THERAPY | Facility: HOSPITAL | Age: 55
Setting detail: THERAPIES SERIES
Discharge: HOME | End: 2020-09-17

## 2020-09-17 DIAGNOSIS — I89.0 LYMPHEDEMA: ICD-10-CM

## 2020-09-17 DIAGNOSIS — Z98.890 H/O VASCULAR SURGERY: ICD-10-CM

## 2020-09-17 DIAGNOSIS — R60.0 BILATERAL LOWER EXTREMITY EDEMA: Primary | ICD-10-CM

## 2020-09-17 PROCEDURE — 97140 MANUAL THERAPY 1/> REGIONS: CPT

## 2020-09-17 PROCEDURE — 97139 UNLISTED THERAPEUTIC PX: CPT

## 2020-09-17 PROCEDURE — 97535 SELF CARE MNGMENT TRAINING: CPT

## 2020-09-17 PROCEDURE — 97016 VASOPNEUMATIC DEVICE THERAPY: CPT

## 2020-09-17 NOTE — THERAPY TREATMENT NOTE
Outpatient Occupational Therapy Lymphedema Treatment Note   [place]     Patient Name: El Nielsen  : 1965  MRN: [id number]  Today's Date: 2020      Visit Date: 2020    There is no problem list on file for this patient.       Past Medical History:   Diagnosis Date   • Arthritis    • Elevated cholesterol    • GERD (gastroesophageal reflux disease)    • Hypertension    • Sleep apnea         Past Surgical History:   Procedure Laterality Date   • EYE SURGERY     • VASCULAR SURGERY           Visit Dx:      ICD-10-CM ICD-9-CM   1. Bilateral lower extremity edema  R60.0 782.3   2. H/O vascular surgery  Z98.890 V45.89   3. Lymphedema  I89.0 457.1       Lymphedema     Row Name 20 1300             Subjective Pain    Able to rate subjective pain?  yes  -BC      Pre-Treatment Pain Level  7  -BC      Post-Treatment Pain Level  6  -BC      Subjective Pain Comment  BLE's  -BC         Subjective Comments    Subjective Comments  Pt. presents this date with no new complaints voiced.  Pt. advised of attendance recommendations for cont. tx. and is agreeable to maintain.   -BC         Lymphedema Assessment    Lymphedema Classification  RLE:;LLE:;stage 2 (Spontaneously Irreversible);primary abdomen  -BC      Infections or Cellulitis?  no  -BC         Posture/Observations    Posture- WNL  Posture is WNL  -BC         Lymphedema Edema Assessment    Ptting Edema Category  By severity  -BC      Pitting Edema  Severe  -BC         Skin Changes/Observations    Location/Assessment  Lower Extremity  -BC      Lower Extremity Conditions  bilateral:;dry;hairless;scaly;fragile  -BC      Lower Extremity Color/Pigment  bilateral:;red;blanchable;hyperpigmented;brawny;fibrosis  -BC         Lymphedema Sensation    Lymphedema Sensation Reports  RLE:;LLE:;numbness;tingling  -BC      Lymphedema Sensation Tests  light touch  -BC      Lymphedema Light Touch  RLE:;LLE:;severe impairment  -BC         Lymphedema Measurements     Measurement Type(s)  Quick Girth  -BC      Quick Girth Areas  Lower extremities  -BC         LLE Quick Girth (cm)    Met-heads  30.8 cm  -BC      Mid foot  34 cm  -BC      Smallest ankle  37.9 cm  -BC      Largest calf  58 cm  -BC      Tib tuberosity  53.5 cm  -BC      Mid patella  59.8 cm  -BC      Distal thigh  69 cm  -BC      Other 1  42.8 cm  -BC      Other 2  48.3 cm  -BC         RLE Quick Girth (cm)    Met-heads  34.5 cm  -BC      Mid foot  36.5 cm  -BC      Smallest ankle  40.7 cm  -BC      Largest calf  61.5 cm  -BC      Tib tuberosity  54 cm  -BC      Mid patella  59.3 cm  -BC      Distal thigh  67.5 cm  -BC      Other 1  43.8 cm  -BC      Other 2  54 cm  -BC      RLE Quick Girth Total  451.8  -BC         Manual Lymphatic Drainage    Manual Lymphatic Drainage  extremity treatment  -BC      Initial Sequence  --  -BC      Opened Regional Lymph Nodes  --  -BC      Axillary  --  -BC      Inguinal  right;left  -BC      Extremity Treatment  MLD to full limb  -BC      MLD to Full Limb  BLE's  -BC      Manual Therapy  MLD to BLE's, Abd., inguinals  -BC         Compression/Skin Care    Compression/Skin Care  skin care;wrapping location;bandaging  -BC      Skin Care  washed/dried;moisturizing lotion applied  -BC      Wrapping Location  lower extremity  -BC      Wrapping Location LE  bilateral: base of toes to below knee per pt. request  -BC      Bandage Layers  cotton elastic stocking- single layer (comment size);soft foam- 1/4 inch;short-stretch bandages (comment size/quantity)  -BC      Bandaging Technique  circumferential/spiral;moderate compression  -BC      Compression/Skin Care Comments  Compression pump x Abd., inguinals  -BC        User Key  (r) = Recorded By, (t) = Taken By, (c) = Cosigned By    Initials Name Provider Type    Vera Mitchell OT Occupational Therapist                  OT Assessment/Plan     Row Name 09/17/20 3495          OT Assessment    Assessment Comments  Pt. positioned in supported  recline for manual lymph drainage, compression pump, skin care and multi layered bandaging of BLE's.  -BC       User Key  (r) = Recorded By, (t) = Taken By, (c) = Cosigned By    Initials Name Provider Type    Vera Mitchell OT Occupational Therapist                    Therapy Education  Given: Symptoms/condition management  Program: Reinforced  How Provided: Verbal  Provided to: Patient  Level of Understanding: Verbalized                Time Calculation:   OT Start Time: 0745  OT Stop Time: 0930  OT Time Calculation (min): 105 min  OT Non-Billable Time (min): 25 min     Therapy Charges for Today     Code Description Service Date Service Provider Modifiers Qty    73443295558 HC OT MANUAL THERAPY EA 15 MIN 9/17/2020 Vera Bowman OT GO, KX 4    43352141432 HC OT LYMPHEDEMA MANAGEMENT-15 MIN 9/17/2020 Vera Bowman OT KX 1    54076277560 HC OT SELF CARE/MGMT/TRAIN EA 15 MIN 9/17/2020 Vera Bowman OT GO KX 1    69925129287 HC OT VASOPNEUMAT DEVIC 1 OR MORE AREAS 9/17/2020 Vera Bowman OT GO KX 1                      Vera Bowman OT  9/17/2020

## 2020-09-18 ENCOUNTER — APPOINTMENT (OUTPATIENT)
Dept: OCCUPATIONAL THERAPY | Facility: HOSPITAL | Age: 55
End: 2020-09-18

## 2020-09-21 ENCOUNTER — APPOINTMENT (OUTPATIENT)
Dept: OCCUPATIONAL THERAPY | Facility: HOSPITAL | Age: 55
End: 2020-09-21

## 2020-09-25 ENCOUNTER — HOSPITAL ENCOUNTER (OUTPATIENT)
Dept: OCCUPATIONAL THERAPY | Facility: HOSPITAL | Age: 55
Setting detail: THERAPIES SERIES
Discharge: HOME | End: 2020-09-25

## 2020-09-25 DIAGNOSIS — R60.0 BILATERAL LOWER EXTREMITY EDEMA: Primary | ICD-10-CM

## 2020-09-25 DIAGNOSIS — Z98.890 H/O VASCULAR SURGERY: ICD-10-CM

## 2020-09-25 DIAGNOSIS — I89.0 LYMPHEDEMA: ICD-10-CM

## 2020-09-25 PROCEDURE — 97140 MANUAL THERAPY 1/> REGIONS: CPT

## 2020-09-25 PROCEDURE — 97016 VASOPNEUMATIC DEVICE THERAPY: CPT

## 2020-09-25 PROCEDURE — 97168 OT RE-EVAL EST PLAN CARE: CPT

## 2020-09-25 PROCEDURE — 97139 UNLISTED THERAPEUTIC PX: CPT

## 2020-09-25 NOTE — THERAPY RE-EVALUATION
Outpatient Occupational Therapy Lymphedema Re-Evaluation   [facility]     Patient Name: El Nielsen  : 1965  MRN: [id number]  Today's Date: 2020      Visit Date: 2020    There is no problem list on file for this patient.       Past Medical History:   Diagnosis Date   • Arthritis    • Elevated cholesterol    • GERD (gastroesophageal reflux disease)    • Hypertension    • Sleep apnea         Past Surgical History:   Procedure Laterality Date   • EYE SURGERY     • VASCULAR SURGERY           Visit Dx:     ICD-10-CM ICD-9-CM   1. Bilateral lower extremity edema  R60.0 782.3   2. Lymphedema  I89.0 457.1   3. H/O vascular surgery  Z98.890 V45.89           Lymphedema     Row Name 20 0800             Subjective Pain    Able to rate subjective pain?  yes  -AB      Pre-Treatment Pain Level  9  -AB      Subjective Pain Comment  R hip, BLE's  -AB         Subjective Comments    Subjective Comments  Pt. presents to treatment this date w/ scratch to LLE shin area that is weeping   -AB         Lymphedema Assessment    Lymphedema Classification  RLE:;LLE:;stage 2 (Spontaneously Irreversible);primary abdomen  -AB      Infections or Cellulitis?  no  -AB         Posture/Observations    Posture- WNL  Posture is WNL  -AB         Lymphedema Edema Assessment    Ptting Edema Category  By severity  -AB      Pitting Edema  Severe  -AB         Skin Changes/Observations    Location/Assessment  Lower Extremity  -AB      Lower Extremity Conditions  bilateral:;dry;hairless;scaly;fragile;other (comment) weeping LLE  -AB      Lower Extremity Color/Pigment  bilateral:;red;blanchable;hyperpigmented;brawny;fibrosis  -AB      Skin Observations Comment  weeping on LLE shin area from a recent scratch  -AB         Lymphedema Sensation    Lymphedema Sensation Reports  RLE:;LLE:;numbness;tingling  -AB      Lymphedema Sensation Tests  light touch  -AB      Lymphedema Light Touch  RLE:;LLE:;severe impairment  -AB         Lymphedema  Measurements    Measurement Type(s)  Quick Girth  -AB      Quick Girth Areas  Lower extremities  -AB         LLE Quick Girth (cm)    Met-heads  32.7 cm  -AB      Mid foot  35.2 cm  -AB      Smallest ankle  40.1 cm  -AB      Largest calf  60.1 cm  -AB      Tib tuberosity  54.5 cm  -AB      Mid patella  60 cm  -AB      Distal thigh  71.2 cm  -AB      Other 1  42.4 cm  -AB      Other 2  49.4 cm  -AB         RLE Quick Girth (cm)    Met-heads  35.8 cm  -AB      Mid foot  37.8 cm  -AB      Smallest ankle  43.4 cm  -AB      Largest calf  63.6 cm  -AB      Tib tuberosity  55.4 cm  -AB      Mid patella  58.8 cm  -AB      Distal thigh  68.1 cm  -AB      Other 1  46.9 cm  -AB      Other 2  56 cm  -AB      RLE Quick Girth Total  465.8  -AB         Manual Lymphatic Drainage    Manual Lymphatic Drainage  extremity treatment;opened regional lymph nodes  -AB      Initial Sequence  abdomen  -AB      Opened Regional Lymph Nodes  axillary;inguinal  -AB      Axillary  right;left  -AB      Inguinal  right;left  -AB      Extremity Treatment  MLD to full limb  -AB      MLD to Full Limb  BLE's  -AB      Manual Therapy  MLD to BLE's, abdomen, axillary, inguinals  -AB         Compression/Skin Care    Compression/Skin Care  skin care;wrapping location;bandaging  -AB      Skin Care  moisturizing lotion applied  -AB      Wrapping Location  lower extremity  -AB      Wrapping Location LE  bilateral: toes to below knee  -AB      Bandage Layers  cotton elastic stocking- single layer (comment size);soft foam- 1/4 inch;short-stretch bandages (comment size/quantity)  -AB      Bandaging Comments  kinesiotape to dorsum of feet  -AB      Bandaging Technique  circumferential/spiral;moderate compression  -AB      Compression/Skin Care Comments  compression pump to abdomen and BLE's  -AB        User Key  (r) = Recorded By, (t) = Taken By, (c) = Cosigned By    Initials Name Provider Type    Chely Mckenna OT Occupational Therapist                   Therapy Education  Given: HEP, Edema management, Symptoms/condition management, Bandaging/dressing change  Program: Reinforced  How Provided: Verbal, Demonstration  Provided to: Patient  Level of Understanding: Verbalized        OT Goals     Row Name 09/25/20 1500          OT Short Term Goals    STG Date to Achieve  10/25/20  -AB     STG 1  Pt./caregiver will be familiar with precautions, skin care, and self management of lymphedema.   -AB     STG 1 Progress  Partially Met  -AB     STG 2  Pt. will decrease overall edema to moderate-severe to decrease risk of infection.   -AB     STG 2 Progress  Ongoing  -AB     STG 3  Pt. will reduce overall girth by 5cm to reduce risk of infection.   -AB     STG 3 Progress  Met  -AB     STG 4  Pt. will be familiar with HEP to assist with improved lymphatic flow.   -AB     STG 4 Progress  Partially Met;Ongoing  -AB     STG 5  Pt./caregiver will be familiar with self care instructions for home MLD for volume reduction.   -AB     STG 5 Progress  Partially Met;Ongoing  -AB        Long Term Goals    LTG Date to Achieve  09/16/20  -AB     LTG 1  Pt./caregiver will be independent with short stretch compression bandaging for volume reduction  -AB     LTG 1 Progress  Ongoing  -AB     LTG 2  Pt. will decrease edema to moderate for decreased risk of infection.   -AB     LTG 2 Progress  Ongoing  -AB     LTG 3  Pt. will reduce overall girth by 10cm to reduce risk of infection.   -AB     LTG 3 Progress  Ongoing  -AB     LTG 4  Pt./caregiver will be independent with donning/doffing compression garment.   -AB     LTG 4 Progress  Ongoing  -AB     LTG 5  Pt./caregiver will be independent with lymphedema management and HEP.   -AB     LTG 5 Progress  Ongoing  -AB        Time Calculation    OT Goal Re-Cert Due Date  12/25/20  -AB       User Key  (r) = Recorded By, (t) = Taken By, (c) = Cosigned By    Initials Name Provider Type    Chely Mckenna, OT Occupational Therapist           OT Assessment/Plan     Row Name 09/25/20 1539          OT Assessment    Functional Limitations  Decreased safety during functional activities;Impaired gait;Performance in work activities;Performance in self-care ADL;Limitations in functional capacity and performance  -AB     Impairments  Balance;Edema;Endurance;Gait;Impaired aerobic capacity;Impaired flexibility;Impaired lymphatic circulation;Impaired sensory integrity;Pain;Sensation  -AB     OT Diagnosis  Lymphedema  -AB     OT Rehab Potential  Good  -AB     Patient/caregiver participated in establishment of treatment plan and goals  Yes  -AB     Patient would benefit from skilled therapy intervention  Yes  -AB        OT Plan    OT Frequency  2x/week  -AB     Planned CPT's?  OT RE-EVAL: 43469;OT THER ACT EA 15 MIN: 72402OI;OT THER PROC EA 15 MIN: 45870MT;OT SELF CARE/MGMT/TRAIN 15 MIN: 06522;OT MANUAL THERAPY EA 15 MIN: 52641;OT BIS XTRACELL FLUID ANALYSIS: 37683;OT VASOPNEUMATIC DEVICE: 52126 lymphedema management  -AB     Planned Therapy Interventions (Optional Details)  home exercise program;manual therapy techniques;patient/family education  -AB     OT Plan Comments  Pt. has missed several appointments within the last month, and his progress is minimal. He would benefit from continued skilled OT services to address ongoing lymphedema and skin integrity needs. Continue tx per POC.  -AB       User Key  (r) = Recorded By, (t) = Taken By, (c) = Cosigned By    Initials Name Provider Type    AB Chely Sierra OT Occupational Therapist                    Time Calculation:   OT Start Time: 0800  OT Stop Time: 0930  OT Time Calculation (min): 90 min  OT Non-Billable Time (min): 45 min     Therapy Charges for Today     Code Description Service Date Service Provider Modifiers Qty    62783444837  OT MANUAL THERAPY EA 15 MIN 9/25/2020 Chely Sierra OT GO, KX 4    55473257545 HC OT LYMPHEDEMA MANAGEMENT-15 MIN 9/25/2020 Chely Sierra  OT KX 1    10635367146 HC OT RE-EVAL 2 9/25/2020 Chely Sierra OT JEREMY, KX 1    80607953124 HC OT VASOPNEUMAT DEVIC 1 OR MORE AREAS 9/25/2020 Chely Sierra OT GO KX 1                    Chely Sierra OT  9/25/2020

## 2020-10-08 ENCOUNTER — HOSPITAL ENCOUNTER (OUTPATIENT)
Dept: OCCUPATIONAL THERAPY | Facility: HOSPITAL | Age: 55
Setting detail: THERAPIES SERIES
Discharge: HOME | End: 2020-10-08

## 2020-10-08 DIAGNOSIS — R60.0 BILATERAL LOWER EXTREMITY EDEMA: Primary | ICD-10-CM

## 2020-10-08 DIAGNOSIS — I89.0 LYMPHEDEMA: ICD-10-CM

## 2020-10-08 DIAGNOSIS — Z98.890 H/O VASCULAR SURGERY: ICD-10-CM

## 2020-10-08 PROCEDURE — 97140 MANUAL THERAPY 1/> REGIONS: CPT

## 2020-10-08 PROCEDURE — 97139 UNLISTED THERAPEUTIC PX: CPT

## 2020-10-08 PROCEDURE — 97535 SELF CARE MNGMENT TRAINING: CPT

## 2020-10-08 PROCEDURE — 97016 VASOPNEUMATIC DEVICE THERAPY: CPT

## 2020-10-08 NOTE — THERAPY TREATMENT NOTE
Outpatient Occupational Therapy Lymphedema Treatment Note   [facility]     Patient Name: El Nielsen  : 1965  MRN: [id number]  Today's Date: 10/8/2020      Visit Date: 10/08/2020    There is no problem list on file for this patient.       Past Medical History:   Diagnosis Date   • Arthritis    • Elevated cholesterol    • GERD (gastroesophageal reflux disease)    • Hypertension    • Sleep apnea         Past Surgical History:   Procedure Laterality Date   • EYE SURGERY     • VASCULAR SURGERY           Visit Dx:      ICD-10-CM ICD-9-CM   1. Bilateral lower extremity edema  R60.0 782.3   2. Lymphedema  I89.0 457.1   3. H/O vascular surgery  Z98.890 V45.89       Lymphedema     Row Name 10/08/20 0800             Subjective Pain    Able to rate subjective pain?  yes  -BC      Pre-Treatment Pain Level  5  -BC      Subjective Pain Comment  Legs  -BC         Subjective Comments    Subjective Comments  Pt. presents this date with no new complaints.   -BC         Lymphedema Assessment    Lymphedema Classification  RLE:;LLE:;stage 2 (Spontaneously Irreversible);primary abdomen  -BC      Infections or Cellulitis?  no  -BC         Posture/Observations    Posture- WNL  Posture is WNL  -BC         Lymphedema Edema Assessment    Ptting Edema Category  By severity  -BC      Pitting Edema  Severe  -BC         Skin Changes/Observations    Location/Assessment  Lower Extremity  -BC      Lower Extremity Conditions  bilateral:;dry;hairless;scaly;fragile;other (comment) weeping LLE  -BC      Lower Extremity Color/Pigment  bilateral:;red;blanchable;hyperpigmented;brawny;fibrosis  -BC         Lymphedema Sensation    Lymphedema Sensation Reports  RLE:;LLE:;numbness;tingling  -BC      Lymphedema Sensation Tests  light touch  -BC      Lymphedema Light Touch  RLE:;LLE:;severe impairment  -BC         Lymphedema Measurements    Measurement Type(s)  Quick Girth  -BC      Quick Girth Areas  Lower extremities  -BC         LLE Quick Girth  (cm)    Met-heads  33.6 cm  -BC      Mid foot  34.6 cm  -BC      Smallest ankle  37.5 cm  -BC      Largest calf  55.8 cm  -BC      Tib tuberosity  50.4 cm  -BC      Mid patella  58.6 cm  -BC      Distal thigh  69.6 cm  -BC      Other 1  39.5 cm  -BC      Other 2  49.2 cm  -BC         RLE Quick Girth (cm)    Met-heads  34.3 cm  -BC      Mid foot  36.8 cm  -BC      Smallest ankle  41.7 cm  -BC      Largest calf  61.1 cm  -BC      Tib tuberosity  53.7 cm  -BC      Mid patella  60.1 cm  -BC      Distal thigh  69 cm  -BC      Other 1  44.5 cm  -BC      Other 2  52.5 cm  -BC      RLE Quick Girth Total  453.7  -BC         Manual Lymphatic Drainage    Manual Lymphatic Drainage  extremity treatment;opened regional lymph nodes  -BC      Initial Sequence  abdomen  -BC      Opened Regional Lymph Nodes  axillary;inguinal  -BC      Axillary  right;left  -BC      Inguinal  right;left  -BC      Extremity Treatment  MLD to full limb  -BC      MLD to Full Limb  BLE's  -BC      Manual Therapy  MLD to BLE's, Abd., inguinals, axillary.  -BC         Compression/Skin Care    Compression/Skin Care  skin care;wrapping location;bandaging  -BC      Skin Care  moisturizing lotion applied  -BC      Wrapping Location  lower extremity  -BC      Wrapping Location LE  bilateral: toes to below knee  -BC      Bandage Layers  cotton elastic stocking- single layer (comment size);soft foam- 1/4 inch;short-stretch bandages (comment size/quantity)  -BC      Bandaging Technique  circumferential/spiral;moderate compression  -BC      Compression/Skin Care Comments  Compression pump X Abd., BLE.   -BC        User Key  (r) = Recorded By, (t) = Taken By, (c) = Cosigned By    Initials Name Provider Type    Vera Mitchell, OT Occupational Therapist                  OT Assessment/Plan     Row Name 10/08/20 7048          OT Assessment    Assessment Comments  Pt. positioned in supported recline for manual lymph draingage, compression pump, skin care and multi  layered bandaging of BLE's base of toes to above knee.  -BC       User Key  (r) = Recorded By, (t) = Taken By, (c) = Cosigned By    Initials Name Provider Type    Vera Mitchell OT Occupational Therapist                                     Time Calculation:   OT Start Time: 0800  OT Stop Time: 0945  OT Time Calculation (min): 105 min  OT Non-Billable Time (min): 20 min     Therapy Charges for Today     Code Description Service Date Service Provider Modifiers Qty    79205612335 HC OT LYMPHEDEMA MANAGEMENT-15 MIN 10/8/2020 Vera Bowman OT KX 1    34347554660 HC OT MANUAL THERAPY EA 15 MIN 10/8/2020 Vera Bowman OT GO, KX 4    95529155781 HC OT SELF CARE/MGMT/TRAIN EA 15 MIN 10/8/2020 Vera Bowman OT GO, KX 1    17636027141 HC OT VASOPNEUMAT DEVIC 1 OR MORE AREAS 10/8/2020 Vera Bowman OT GO, KX 1                      Vera Bowman OT  10/8/2020

## 2020-10-13 ENCOUNTER — HOSPITAL ENCOUNTER (OUTPATIENT)
Dept: OCCUPATIONAL THERAPY | Facility: HOSPITAL | Age: 55
Setting detail: THERAPIES SERIES
Discharge: HOME | End: 2020-10-13

## 2020-10-13 DIAGNOSIS — I89.0 LYMPHEDEMA: ICD-10-CM

## 2020-10-13 DIAGNOSIS — Z98.890 H/O VASCULAR SURGERY: ICD-10-CM

## 2020-10-13 DIAGNOSIS — R60.0 BILATERAL LOWER EXTREMITY EDEMA: Primary | ICD-10-CM

## 2020-10-13 PROCEDURE — 97535 SELF CARE MNGMENT TRAINING: CPT

## 2020-10-13 PROCEDURE — 97016 VASOPNEUMATIC DEVICE THERAPY: CPT

## 2020-10-13 PROCEDURE — 97140 MANUAL THERAPY 1/> REGIONS: CPT

## 2020-10-13 PROCEDURE — 97139 UNLISTED THERAPEUTIC PX: CPT

## 2020-10-13 NOTE — THERAPY TREATMENT NOTE
Outpatient Occupational Therapy Lymphedema Treatment Note   [place]     Patient Name: El Nielsen  : 1965  MRN: [id number]  Today's Date: 10/13/2020      Visit Date: 10/13/2020    There is no problem list on file for this patient.       Past Medical History:   Diagnosis Date   • Arthritis    • Elevated cholesterol    • GERD (gastroesophageal reflux disease)    • Hypertension    • Sleep apnea         Past Surgical History:   Procedure Laterality Date   • EYE SURGERY     • VASCULAR SURGERY           Visit Dx:      ICD-10-CM ICD-9-CM   1. Bilateral lower extremity edema  R60.0 782.3   2. Lymphedema  I89.0 457.1   3. H/O vascular surgery  Z98.890 V45.89       Lymphedema     Row Name 10/13/20 0900             Subjective Pain    Able to rate subjective pain?  yes  -BC      Pre-Treatment Pain Level  5  -BC      Subjective Pain Comment  Ankles  -BC         Subjective Comments    Subjective Comments  Pt. reports increased 'bee sting' type pains in RLE.   -BC         Lymphedema Assessment    Lymphedema Classification  RLE:;LLE:;stage 2 (Spontaneously Irreversible);primary abdomen  -BC      Infections or Cellulitis?  no  -BC         Posture/Observations    Posture- WNL  Posture is WNL  -BC         Lymphedema Edema Assessment    Ptting Edema Category  By severity  -BC      Pitting Edema  Severe  -BC         Skin Changes/Observations    Location/Assessment  Lower Extremity  -BC      Lower Extremity Conditions  bilateral:;dry;hairless;scaly;fragile;other (comment) weeping LLE  -BC      Lower Extremity Color/Pigment  bilateral:;red;blanchable;hyperpigmented;brawny;fibrosis  -BC         Lymphedema Sensation    Lymphedema Sensation Reports  RLE:;LLE:;numbness;tingling  -BC      Lymphedema Sensation Tests  light touch  -BC      Lymphedema Light Touch  RLE:;LLE:;severe impairment  -BC         Lymphedema Measurements    Measurement Type(s)  Quick Girth  -BC      Quick Girth Areas  Lower extremities  -BC         LLE  Quick Girth (cm)    Met-heads  33 cm  -BC      Mid foot  34.8 cm  -BC      Smallest ankle  38 cm  -BC      Largest calf  56 cm  -BC      Tib tuberosity  50.4 cm  -BC      Mid patella  59.3 cm  -BC      Distal thigh  68.5 cm  -BC      Other 1  41.2 cm  -BC      Other 2  49.5 cm  -BC         RLE Quick Girth (cm)    Met-heads  35 cm  -BC      Mid foot  36.8 cm  -BC      Smallest ankle  41.8 cm  -BC      Largest calf  59.5 cm  -BC      Tib tuberosity  53 cm  -BC      Mid patella  57.4 cm  -BC      Distal thigh  66.5 cm  -BC      Other 1  44.5 cm  -BC      Other 2  53.1 cm  -BC      RLE Quick Girth Total  447.6  -BC         Manual Lymphatic Drainage    Manual Lymphatic Drainage  extremity treatment;opened regional lymph nodes  -BC      Initial Sequence  abdomen  -BC      Opened Regional Lymph Nodes  axillary;inguinal  -BC      Axillary  right;left  -BC      Inguinal  right;left  -BC      Extremity Treatment  MLD to full limb  -BC      MLD to Full Limb  BLE's  -BC      Manual Therapy  MLD to BLE's, Abd., inguinals, axillary  -BC         Compression/Skin Care    Compression/Skin Care  skin care;wrapping location;bandaging  -BC      Skin Care  moisturizing lotion applied  -BC      Wrapping Location  lower extremity  -BC      Wrapping Location LE  bilateral: toes to below knee  -BC      Bandage Layers  cotton elastic stocking- single layer (comment size);soft foam- 1/4 inch;short-stretch bandages (comment size/quantity)  -BC      Bandaging Technique  circumferential/spiral;moderate compression  -BC      Compression/Skin Care Comments  Compression pump x Abd., BLE's  -BC        User Key  (r) = Recorded By, (t) = Taken By, (c) = Cosigned By    Initials Name Provider Type    Vera Mitchell OT Occupational Therapist                  OT Assessment/Plan     Row Name 10/13/20 1020          OT Assessment    Assessment Comments  Pt. positioned in supported recline for manual lymph drainage, compression pump, skin care and multi  layered bandaging of BLE's base of toes to above knee.  -BC       User Key  (r) = Recorded By, (t) = Taken By, (c) = Cosigned By    Initials Name Provider Type    Vera Mitchell OT Occupational Therapist                    Therapy Education  Given: HEP, Edema management, Symptoms/condition management, Bandaging/dressing change  Program: Reinforced  How Provided: Verbal  Provided to: Patient  Level of Understanding: Verbalized                Time Calculation:   OT Start Time: 0830  OT Stop Time: 1000  OT Time Calculation (min): 90 min  OT Non-Billable Time (min): 20 min     Therapy Charges for Today     Code Description Service Date Service Provider Modifiers Qty    55808489083 HC OT LYMPHEDEMA MANAGEMENT-15 MIN 10/13/2020 Vera Bowman OT KX 1    46787333147 HC OT MANUAL THERAPY EA 15 MIN 10/13/2020 Vera Bowman OT GO, KX 3    12018776418 HC OT SELF CARE/MGMT/TRAIN EA 15 MIN 10/13/2020 Vera Bowman OT GO KX 1    33539775665 HC OT VASOPNEUMAT DEVIC 1 OR MORE AREAS 10/13/2020 Vera Bowman OT GO, KX 1                      Vera Bowman OT  10/13/2020

## 2020-10-15 ENCOUNTER — APPOINTMENT (OUTPATIENT)
Dept: OCCUPATIONAL THERAPY | Facility: HOSPITAL | Age: 55
End: 2020-10-15

## 2020-10-22 ENCOUNTER — HOSPITAL ENCOUNTER (OUTPATIENT)
Dept: OCCUPATIONAL THERAPY | Facility: HOSPITAL | Age: 55
Setting detail: THERAPIES SERIES
Discharge: HOME | End: 2020-10-22

## 2020-10-22 DIAGNOSIS — Z98.890 H/O VASCULAR SURGERY: ICD-10-CM

## 2020-10-22 DIAGNOSIS — I89.0 LYMPHEDEMA: ICD-10-CM

## 2020-10-22 DIAGNOSIS — R60.0 BILATERAL LOWER EXTREMITY EDEMA: Primary | ICD-10-CM

## 2020-10-22 PROCEDURE — 97139 UNLISTED THERAPEUTIC PX: CPT

## 2020-10-22 PROCEDURE — 97016 VASOPNEUMATIC DEVICE THERAPY: CPT

## 2020-10-22 PROCEDURE — 97140 MANUAL THERAPY 1/> REGIONS: CPT

## 2020-10-22 PROCEDURE — 97150 GROUP THERAPEUTIC PROCEDURES: CPT

## 2020-10-22 NOTE — THERAPY PROGRESS REPORT/RE-CERT
"Outpatient Occupational Therapy Lymphedema Progress Note   [facility]     Patient Name: El Nielsen  : 1965  MRN: [id number]  Today's Date: 10/22/2020      Visit Date: 10/22/2020    There is no problem list on file for this patient.       Past Medical History:   Diagnosis Date   • Arthritis    • Elevated cholesterol    • GERD (gastroesophageal reflux disease)    • Hypertension    • Sleep apnea         Past Surgical History:   Procedure Laterality Date   • EYE SURGERY     • VASCULAR SURGERY           Visit Dx:      ICD-10-CM ICD-9-CM   1. Bilateral lower extremity edema  R60.0 782.3   2. Lymphedema  I89.0 457.1   3. H/O vascular surgery  Z98.890 V45.89       Lymphedema     Row Name 10/22/20 0800             Subjective Pain    Able to rate subjective pain?  yes  -AB      Pre-Treatment Pain Level  6  -AB      Subjective Pain Comment  BLE's  -AB         Subjective Comments    Subjective Comments  Pt. reports no c/o of \"bee sting\" type pain. However, he does c/o pain in BLE's and feet.   -AB         Lymphedema Assessment    Lymphedema Classification  RLE:;LLE:;stage 2 (Spontaneously Irreversible);primary abdomen  -AB      Infections or Cellulitis?  no  -AB         Posture/Observations    Posture- WNL  Posture is WNL  -AB         Lymphedema Edema Assessment    Ptting Edema Category  By severity  -AB      Pitting Edema  Severe;+ 4/4  -AB      Edema Assessment Comment  more pliability noted in LLE  -AB         Skin Changes/Observations    Location/Assessment  Lower Extremity  -AB      Lower Extremity Conditions  bilateral:;dry;hairless;scaly;fragile  -AB      Lower Extremity Color/Pigment  bilateral:;red;blanchable;hyperpigmented;brawny;fibrosis  -AB         Lymphedema Sensation    Lymphedema Sensation Reports  RLE:;LLE:;numbness;tingling  -AB      Lymphedema Sensation Tests  light touch  -AB      Lymphedema Light Touch  RLE:;LLE:;severe impairment  -AB         Lymphedema Measurements    Measurement Type(s)  " Quick Girth  -AB      Quick Girth Areas  Lower extremities  -AB         LLE Quick Girth (cm)    Met-heads  32.5 cm  -AB      Mid foot  33.8 cm  -AB      Smallest ankle  39.3 cm  -AB      Largest calf  56.5 cm  -AB      Tib tuberosity  50.9 cm  -AB      Mid patella  55.3 cm  -AB      Distal thigh  69.4 cm  -AB      Other 1  41.3 cm  -AB      Other 2  48.6 cm  -AB         RLE Quick Girth (cm)    Met-heads  33 cm  -AB      Mid foot  35.3 cm  -AB      Smallest ankle  41 cm  -AB      Largest calf  61.1 cm  -AB      Tib tuberosity  52 cm  -AB      Mid patella  57.5 cm  -AB      Distal thigh  66.4 cm  -AB      Other 1  44.6 cm  -AB      Other 2  54.9 cm  -AB      RLE Quick Girth Total  445.8  -AB         Manual Lymphatic Drainage    Manual Lymphatic Drainage  extremity treatment;opened regional lymph nodes  -AB      Initial Sequence  abdomen  -AB      Opened Regional Lymph Nodes  axillary;inguinal  -AB      Axillary  right;left  -AB      Inguinal  right;left  -AB      Extremity Treatment  MLD to full limb  -AB      MLD to Full Limb  BLE's  -AB      Manual Therapy  MLD to BLE's, abdomen, axillary, inguinals  -AB         Compression/Skin Care    Compression/Skin Care  skin care;wrapping location;bandaging  -AB      Skin Care  moisturizing lotion applied  -AB      Wrapping Location  lower extremity  -AB      Wrapping Location LE  bilateral: toes to below knee  -AB      Bandage Layers  cotton elastic stocking- single layer (comment size);soft foam- 1/4 inch;short-stretch bandages (comment size/quantity)  -AB      Bandaging Comments  kinesiotape to bilateral toes  -AB      Bandaging Technique  circumferential/spiral;moderate compression  -AB      Compression/Skin Care Comments  compression pump to abdomen and BLE's  -AB        User Key  (r) = Recorded By, (t) = Taken By, (c) = Cosigned By    Initials Name Provider Type    Chely Mckenna, OT Occupational Therapist                  OT Assessment/Plan     Row Name  10/22/20 1155          OT Plan    Planned Therapy Interventions (Optional Details)  home exercise program;patient/family education;manual therapy techniques  -AB     OT Plan Comments  Pt. continues to make progress towards goals and will benefit from continued skilled OT services to address s/s of Lymphedema.  -AB       User Key  (r) = Recorded By, (t) = Taken By, (c) = Cosigned By    Initials Name Provider Type    Chely Mckenna, OT Occupational Therapist                OT Goals     Row Name 10/22/20 1100          OT Short Term Goals    STG Date to Achieve  10/25/20  -AB     STG 1  Pt./caregiver will be familiar with precautions, skin care, and self management of lymphedema.   -AB     STG 1 Progress  Met  -AB     STG 2  Pt. will decrease overall edema to moderate-severe to decrease risk of infection.   -AB     STG 2 Progress  Ongoing  -AB     STG 3  Pt. will reduce overall girth by 5cm to reduce risk of infection.   -AB     STG 3 Progress  Met  -AB     STG 4  Pt. will be familiar with HEP to assist with improved lymphatic flow.   -AB     STG 4 Progress  Met  -AB     STG 5  Pt./caregiver will be familiar with self care instructions for home MLD for volume reduction.   -AB     STG 5 Progress  Partially Met;Ongoing  -AB        Long Term Goals    LTG Date to Achieve  10/22/20  -AB     LTG 1  Pt./caregiver will be independent with short stretch compression bandaging for volume reduction  -AB     LTG 1 Progress  Ongoing  -AB     LTG 2  Pt. will decrease edema to moderate for decreased risk of infection.   -AB     LTG 2 Progress  Ongoing  -AB     LTG 3  Pt. will reduce overall girth by 10cm to reduce risk of infection.   -AB     LTG 3 Progress  Met  -AB     LTG 4  Pt./caregiver will be independent with donning/doffing compression garment.   -AB     LTG 4 Progress  Ongoing  -AB     LTG 5  Pt./caregiver will be independent with lymphedema management and HEP.   -AB     LTG 5 Progress  Partially Met  -AB         Time Calculation    OT Goal Re-Cert Due Date  12/25/20  -AB       User Key  (r) = Recorded By, (t) = Taken By, (c) = Cosigned By    Initials Name Provider Type    Chely Mckenna OT Occupational Therapist          Therapy Education  Given: HEP, Edema management, Symptoms/condition management, Bandaging/dressing change  Program: Reinforced  How Provided: Verbal, Demonstration  Provided to: Patient  Level of Understanding: Verbalized                Time Calculation:   OT Start Time: 0800  OT Stop Time: 0945  OT Time Calculation (min): 105 min  OT Non-Billable Time (min): 35 min  Total Timed Code Minutes- OT: 105 minute(s)     Therapy Charges for Today     Code Description Service Date Service Provider Modifiers Qty    48100222791  OT MANUAL THERAPY EA 15 MIN 10/22/2020 Chely Sierra OT GO, KX 4    [id number]  OT LYMPHEDEMA MANAGEMENT-15 MIN 10/22/2020 Chely Sierra, OT KX 1    [id number]  OT VASOPNEUMAT DEVIC 1 OR MORE AREAS 10/22/2020 Chely Sierra OT GO, KX 1    [id number]  OT THER PROC GROUP 10/22/2020 Chely Sierra OT GO, KX 1                      Chely Sierra OT  10/22/2020

## 2020-10-27 ENCOUNTER — HOSPITAL ENCOUNTER (OUTPATIENT)
Dept: OCCUPATIONAL THERAPY | Facility: HOSPITAL | Age: 55
Setting detail: THERAPIES SERIES
Discharge: HOME | End: 2020-10-27

## 2020-10-27 DIAGNOSIS — I89.0 LYMPHEDEMA: ICD-10-CM

## 2020-10-27 DIAGNOSIS — Z98.890 H/O VASCULAR SURGERY: ICD-10-CM

## 2020-10-27 DIAGNOSIS — R60.0 BILATERAL LOWER EXTREMITY EDEMA: Primary | ICD-10-CM

## 2020-10-27 PROCEDURE — 97140 MANUAL THERAPY 1/> REGIONS: CPT

## 2020-10-27 PROCEDURE — 97139 UNLISTED THERAPEUTIC PX: CPT

## 2020-10-27 PROCEDURE — 97016 VASOPNEUMATIC DEVICE THERAPY: CPT

## 2020-10-27 NOTE — THERAPY TREATMENT NOTE
Outpatient Occupational Therapy Lymphedema Treatment Note   [facility]     Patient Name: El Nielsen  : 1965  MRN: [id number]  Today's Date: 10/27/2020      Visit Date: 10/27/2020    There is no problem list on file for this patient.       Past Medical History:   Diagnosis Date   • Arthritis    • Elevated cholesterol    • GERD (gastroesophageal reflux disease)    • Hypertension    • Sleep apnea         Past Surgical History:   Procedure Laterality Date   • EYE SURGERY     • VASCULAR SURGERY           Visit Dx:      ICD-10-CM ICD-9-CM   1. Bilateral lower extremity edema  R60.0 782.3   2. Lymphedema  I89.0 457.1   3. H/O vascular surgery  Z98.890 V45.89       Lymphedema     Row Name 10/27/20 1300             Subjective Pain    Able to rate subjective pain?  yes  -AB      Pre-Treatment Pain Level  7  -AB      Subjective Pain Comment  R foot  -AB         Subjective Comments    Subjective Comments  Pt. presents to treatment w/ kinesiotape still donned to bilateral feet and legs. He reports 7/10 pain in his R foot.   -AB         Lymphedema Assessment    Lymphedema Classification  RLE:;LLE:;stage 2 (Spontaneously Irreversible);primary abdomen  -AB      Infections or Cellulitis?  no  -AB         Posture/Observations    Posture- WNL  Posture is WNL  -AB         Lymphedema Edema Assessment    Ptting Edema Category  By severity  -AB      Pitting Edema  Severe;+ 4/4  -AB         Skin Changes/Observations    Location/Assessment  Lower Extremity  -AB      Lower Extremity Conditions  bilateral:;dry;hairless;scaly;fragile  -AB      Lower Extremity Color/Pigment  bilateral:;red;blanchable;hyperpigmented;brawny;fibrosis  -AB         Lymphedema Sensation    Lymphedema Sensation Reports  RLE:;LLE:;numbness;tingling  -AB      Lymphedema Sensation Tests  light touch  -AB      Lymphedema Light Touch  RLE:;LLE:;severe impairment  -AB         Lymphedema Measurements    Measurement Type(s)  Quick Girth  -AB      Quick Girth  Areas  Lower extremities  -AB      Lymphedema Measurements Comments  increased swelling noted in BLE's, especially RLE. Pt. reports being up on them a lot this AM w/out compression.   -AB         LLE Quick Girth (cm)    Met-heads  33.6 cm  -AB      Mid foot  34.4 cm  -AB      Smallest ankle  39.4 cm  -AB      Largest calf  56.9 cm  -AB      Tib tuberosity  50.7 cm  -AB      Mid patella  55.8 cm  -AB      Distal thigh  68.8 cm  -AB      Other 1  40.1 cm  -AB      Other 2  52.6 cm  -AB         RLE Quick Girth (cm)    Met-heads  35.8 cm  -AB      Mid foot  37.7 cm  -AB      Smallest ankle  43.3 cm  -AB      Largest calf  61.9 cm  -AB      Tib tuberosity  53.9 cm  -AB      Mid patella  58.8 cm  -AB      Distal thigh  67.8 cm  -AB      Other 1  45.2 cm  -AB      Other 2  53.8 cm  -AB      RLE Quick Girth Total  458.2  -AB         Manual Lymphatic Drainage    Manual Lymphatic Drainage  extremity treatment;opened regional lymph nodes  -AB      Initial Sequence  abdomen  -AB      Abdomen  superficial  -AB      Opened Regional Lymph Nodes  axillary;inguinal  -AB      Axillary  right;left  -AB      Inguinal  right;left  -AB      Extremity Treatment  MLD to full limb  -AB      MLD to Full Limb  BLE's   -AB      Manual Therapy  MLD to BLE's, abdomen, axillary, inguinals  -AB         Compression/Skin Care    Compression/Skin Care  skin care;wrapping location;bandaging  -AB      Skin Care  moisturizing lotion applied  -AB      Wrapping Location  lower extremity  -AB      Wrapping Location LE  bilateral: base of toes to below knee  -AB      Bandage Layers  cotton elastic stocking- single layer (comment size);soft foam- 1/4 inch;short-stretch bandages (comment size/quantity)  -AB      Bandaging Technique  circumferential/spiral;moderate compression  -AB      Compression/Skin Care Comments  compression pump to abdomen and BLE's  -AB        User Key  (r) = Recorded By, (t) = Taken By, (c) = Cosigned By    Initials Name Provider Type     Chely Mckenna OT Occupational Therapist                                Therapy Education  Given: HEP, Symptoms/condition management, Edema management, Bandaging/dressing change  Program: Reinforced  How Provided: Verbal, Demonstration  Provided to: Patient  Level of Understanding: Verbalized                Time Calculation:   OT Start Time: 1255  OT Stop Time: 1430  OT Time Calculation (min): 95 min  OT Non-Billable Time (min): 25 min  Total Timed Code Minutes- OT: 95 minute(s)     Therapy Charges for Today     Code Description Service Date Service Provider Modifiers Qty    [id number]  OT MANUAL THERAPY EA 15 MIN 10/27/2020 Chely Sierra, OT GO, KX 4    [id number]  OT LYMPHEDEMA MANAGEMENT-15 MIN 10/27/2020 Chely Sierra OT KX 1    [id number]  OT VASOPNEUMAT DEVIC 1 OR MORE AREAS 10/27/2020 Chely Sierra OT GO, KX 1                      Chely Sierra OT  10/27/2020

## 2020-11-09 ENCOUNTER — HOSPITAL ENCOUNTER (OUTPATIENT)
Dept: OCCUPATIONAL THERAPY | Facility: HOSPITAL | Age: 55
Setting detail: THERAPIES SERIES
Discharge: HOME | End: 2020-11-09

## 2020-11-09 DIAGNOSIS — I89.0 LYMPHEDEMA: ICD-10-CM

## 2020-11-09 DIAGNOSIS — R60.0 BILATERAL LOWER EXTREMITY EDEMA: Primary | ICD-10-CM

## 2020-11-09 DIAGNOSIS — Z98.890 H/O VASCULAR SURGERY: ICD-10-CM

## 2020-11-09 PROCEDURE — 97016 VASOPNEUMATIC DEVICE THERAPY: CPT

## 2020-11-09 PROCEDURE — 97535 SELF CARE MNGMENT TRAINING: CPT

## 2020-11-09 PROCEDURE — 97140 MANUAL THERAPY 1/> REGIONS: CPT

## 2020-11-09 PROCEDURE — 97139 UNLISTED THERAPEUTIC PX: CPT

## 2020-11-09 NOTE — THERAPY TREATMENT NOTE
Outpatient Occupational Therapy Lymphedema Treatment Note   [place]     Patient Name: El Nielsen  : 1965  MRN: [id number]  Today's Date: 2020      Visit Date: 2020    There is no problem list on file for this patient.       Past Medical History:   Diagnosis Date   • Arthritis    • Elevated cholesterol    • GERD (gastroesophageal reflux disease)    • Hypertension    • Sleep apnea         Past Surgical History:   Procedure Laterality Date   • EYE SURGERY     • VASCULAR SURGERY           Visit Dx:      ICD-10-CM ICD-9-CM   1. Bilateral lower extremity edema  R60.0 782.3   2. Lymphedema  I89.0 457.1   3. H/O vascular surgery  Z98.890 V45.89       Lymphedema     Row Name 20 0800             Subjective Pain    Able to rate subjective pain?  yes  -BC      Pre-Treatment Pain Level  9  -BC      Subjective Pain Comment  BLE's/hips  -BC         Subjective Comments    Subjective Comments  Pt. presents this date with c/o high pain levels today.   -BC         Lymphedema Assessment    Lymphedema Classification  RLE:;LLE:;stage 2 (Spontaneously Irreversible);primary abdomen  -BC      Infections or Cellulitis?  no  -BC         Posture/Observations    Posture- WNL  Posture is WNL  -BC         Lymphedema Edema Assessment    Ptting Edema Category  By severity  -BC      Pitting Edema  Severe;+ 4/4  -BC         Skin Changes/Observations    Location/Assessment  Lower Extremity  -BC      Lower Extremity Conditions  bilateral:;dry;hairless;scaly;fragile  -BC      Lower Extremity Color/Pigment  bilateral:;red;blanchable;hyperpigmented;brawny;fibrosis  -BC         Lymphedema Sensation    Lymphedema Sensation Reports  RLE:;LLE:;numbness;tingling  -BC      Lymphedema Sensation Tests  light touch  -BC      Lymphedema Light Touch  RLE:;LLE:;severe impairment  -BC         Lymphedema Measurements    Measurement Type(s)  Quick Girth  -BC      Quick Girth Areas  Lower extremities  -BC         LLE Quick Girth (cm)     Met-heads  32.3 cm  -BC      Mid foot  34.9 cm  -BC      Smallest ankle  36.6 cm  -BC      Largest calf  54 cm  -BC      Tib tuberosity  48.7 cm  -BC      Mid patella  59 cm  -BC      Distal thigh  70 cm  -BC      Other 1  39.5 cm  -BC      Other 2  46 cm  -BC         RLE Quick Girth (cm)    Met-heads  34.2 cm  -BC      Mid foot  36.8 cm  -BC      Smallest ankle  40.5 cm  -BC      Largest calf  60.5 cm  -BC      Tib tuberosity  51.6 cm  -BC      Mid patella  59.1 cm  -BC      Distal thigh  68.8 cm  -BC      Other 1  43.6 cm  -BC      Other 2  53.2 cm  -BC      RLE Quick Girth Total  448.3  -BC         Manual Lymphatic Drainage    Manual Lymphatic Drainage  extremity treatment;opened regional lymph nodes  -BC      Initial Sequence  abdomen  -BC      Abdomen  superficial  -BC      Opened Regional Lymph Nodes  axillary;inguinal  -BC      Axillary  right;left  -BC      Inguinal  right;left  -BC      Extremity Treatment  MLD to full limb  -BC      MLD to Full Limb  BLE's  -BC      Manual Therapy  MLD to BLE's  -BC         Compression/Skin Care    Compression/Skin Care  skin care;wrapping location;bandaging  -BC      Skin Care  moisturizing lotion applied  -BC      Wrapping Location  lower extremity  -BC      Wrapping Location LE  bilateral: base of toes to below knee  -BC      Bandage Layers  cotton elastic stocking- single layer (comment size);soft foam- 1/4 inch;short-stretch bandages (comment size/quantity)  -BC      Bandaging Technique  circumferential/spiral;moderate compression  -BC      Compression/Skin Care Comments  Compression pump x Abd., BLE's  -BC        User Key  (r) = Recorded By, (t) = Taken By, (c) = Cosigned By    Initials Name Provider Type    Vera Mitchell OT Occupational Therapist                  OT Assessment/Plan     Row Name 11/09/20 7100          OT Assessment    Assessment Comments  Pt. positioned in supported recline for manual lymph drainage, compression pump, skin care and multi  layered bandaging of BLE's base of toes to below knee.  -BC       User Key  (r) = Recorded By, (t) = Taken By, (c) = Cosigned By    Initials Name Provider Type    Vera Mitchell OT Occupational Therapist                    Therapy Education  Given: Symptoms/condition management  Program: Reinforced  How Provided: Verbal  Provided to: Patient  Level of Understanding: Verbalized                Time Calculation:   OT Start Time: 0800  OT Stop Time: 0915  OT Time Calculation (min): 75 min  OT Non-Billable Time (min): 20 min     Therapy Charges for Today     Code Description Service Date Service Provider Modifiers Qty    35091636588 HC OT LYMPHEDEMA MANAGEMENT-15 MIN 11/9/2020 Vera Bowman OT KX 1    50594296554 HC OT MANUAL THERAPY EA 15 MIN 11/9/2020 Vera Bowman OT GO, KX 2    17442173229 HC OT SELF CARE/MGMT/TRAIN EA 15 MIN 11/9/2020 Vera Bowman OT GO KX 1    74149333063 HC OT VASOPNEUMAT DEVIC 1 OR MORE AREAS 11/9/2020 Vera Bowman OT GO KX 1                      Vera Bowman OT  11/9/2020

## 2020-11-30 ENCOUNTER — HOSPITAL ENCOUNTER (OUTPATIENT)
Dept: OCCUPATIONAL THERAPY | Facility: HOSPITAL | Age: 55
Setting detail: THERAPIES SERIES
Discharge: HOME | End: 2020-11-30

## 2020-11-30 DIAGNOSIS — I89.0 LYMPHEDEMA: ICD-10-CM

## 2020-11-30 DIAGNOSIS — R60.0 BILATERAL LOWER EXTREMITY EDEMA: Primary | ICD-10-CM

## 2020-11-30 DIAGNOSIS — Z98.890 H/O VASCULAR SURGERY: ICD-10-CM

## 2020-11-30 PROCEDURE — 97139 UNLISTED THERAPEUTIC PX: CPT

## 2020-11-30 PROCEDURE — 97016 VASOPNEUMATIC DEVICE THERAPY: CPT

## 2020-11-30 PROCEDURE — 97140 MANUAL THERAPY 1/> REGIONS: CPT

## 2020-11-30 PROCEDURE — 97535 SELF CARE MNGMENT TRAINING: CPT

## 2020-12-11 ENCOUNTER — APPOINTMENT (OUTPATIENT)
Dept: OCCUPATIONAL THERAPY | Facility: HOSPITAL | Age: 55
End: 2020-12-11

## 2020-12-15 ENCOUNTER — APPOINTMENT (OUTPATIENT)
Dept: OCCUPATIONAL THERAPY | Facility: HOSPITAL | Age: 55
End: 2020-12-15

## 2020-12-16 ENCOUNTER — HOSPITAL ENCOUNTER (OUTPATIENT)
Dept: OCCUPATIONAL THERAPY | Facility: HOSPITAL | Age: 55
Discharge: HOME | End: 2020-12-16
Admitting: FAMILY MEDICINE

## 2020-12-16 DIAGNOSIS — R60.0 BILATERAL LOWER EXTREMITY EDEMA: Primary | ICD-10-CM

## 2020-12-16 DIAGNOSIS — Z98.890 H/O VASCULAR SURGERY: ICD-10-CM

## 2020-12-16 DIAGNOSIS — I89.0 LYMPHEDEMA: ICD-10-CM

## 2020-12-16 PROCEDURE — 97535 SELF CARE MNGMENT TRAINING: CPT

## 2020-12-16 PROCEDURE — 97016 VASOPNEUMATIC DEVICE THERAPY: CPT

## 2020-12-16 PROCEDURE — 97139 UNLISTED THERAPEUTIC PX: CPT

## 2020-12-16 PROCEDURE — 97140 MANUAL THERAPY 1/> REGIONS: CPT

## 2020-12-16 NOTE — THERAPY TREATMENT NOTE
Outpatient Occupational Therapy Lymphedema Treatment Note   [place]     Patient Name: El Nielsen  : 1965  MRN: [id number]  Today's Date: 2020      Visit Date: 2020    There is no problem list on file for this patient.       Past Medical History:   Diagnosis Date   • Arthritis    • Elevated cholesterol    • GERD (gastroesophageal reflux disease)    • Hypertension    • Sleep apnea         Past Surgical History:   Procedure Laterality Date   • EYE SURGERY     • VASCULAR SURGERY           Visit Dx:      ICD-10-CM ICD-9-CM   1. Bilateral lower extremity edema  R60.0 782.3   2. Lymphedema  I89.0 457.1   3. H/O vascular surgery  Z98.890 V45.89       Lymphedema     Row Name 20 1300             Subjective Pain    Able to rate subjective pain?  yes  -AB      Pre-Treatment Pain Level  4  -AB      Subjective Pain Comment  BLE's  -AB         Subjective Comments    Subjective Comments  Pt. presents to tx w/ no new c/o this date. He states that his skin is still dry, but not hurting like previous visit.    -AB         Lymphedema Assessment    Lymphedema Classification  RLE:;LLE:;stage 2 (Spontaneously Irreversible);primary abdomen  -AB      Infections or Cellulitis?  no  -AB         Posture/Observations    Posture- WNL  Posture is WNL  -AB         Lymphedema Edema Assessment    Ptting Edema Category  By severity  -AB      Pitting Edema  Severe;+ 4/4  -AB         Skin Changes/Observations    Location/Assessment  Lower Extremity  -AB      Lower Extremity Conditions  bilateral:;dry;hairless;scaly;fragile  -AB      Lower Extremity Color/Pigment  bilateral:;red;blanchable;hyperpigmented;brawny;fibrosis  -AB         Lymphedema Sensation    Lymphedema Sensation Reports  RLE:;LLE:;numbness;tingling  -AB      Lymphedema Sensation Tests  light touch  -AB      Lymphedema Light Touch  RLE:;LLE:;severe impairment  -AB         Lymphedema Measurements    Measurement Type(s)  Quick Girth  -AB      Quick Girth  Areas  Lower extremities  -AB         LLE Quick Girth (cm)    Met-heads  33.5 cm  -AB      Mid foot  35.6 cm  -AB      Smallest ankle  38.7 cm  -AB      Largest calf  56.3 cm  -AB      Tib tuberosity  50.3 cm  -AB      Mid patella  58.3 cm  -AB      Distal thigh  67 cm  -AB      Other 1  42 cm  -AB      Other 2  52.8 cm  -AB         RLE Quick Girth (cm)    Met-heads  35 cm  -AB      Mid foot  36.3 cm  -AB      Smallest ankle  42.3 cm  -AB      Largest calf  61.1 cm  -AB      Tib tuberosity  53.1 cm  -AB      Mid patella  59 cm  -AB      Distal thigh  68 cm  -AB      Other 1  43.8 cm  -AB      Other 2  53.3 cm  -AB      RLE Quick Girth Total  451.9  -AB         Manual Lymphatic Drainage    Manual Lymphatic Drainage  extremity treatment;opened regional lymph nodes  -AB      Initial Sequence  abdomen  -AB      Abdomen  superficial  -AB      Opened Regional Lymph Nodes  axillary;inguinal  -AB      Axillary  right;left  -AB      Inguinal  right;left  -AB      Extremity Treatment  MLD to full limb  -AB      MLD to Full Limb  BLE's  -AB      Manual Therapy  MLD to BLE's, abdomen, inguinals  -AB         Compression/Skin Care    Compression/Skin Care  skin care;wrapping location;bandaging  -AB      Skin Care  moisturizing lotion applied  -AB      Wrapping Location  lower extremity  -AB      Wrapping Location LE  bilateral: toes to below knees  -AB      Bandage Layers  cotton elastic stocking- single layer (comment size);soft foam- 1/4 inch;short-stretch bandages (comment size/quantity)  -AB      Bandaging Technique  circumferential/spiral;moderate compression  -AB      Compression/Skin Care Comments  compression pump to abdomen and BLE's  -AB        User Key  (r) = Recorded By, (t) = Taken By, (c) = Cosigned By    Initials Name Provider Type    Chely Mckenna, OT Occupational Therapist                                Therapy Education  Given: HEP, Edema management, Symptoms/condition management, Bandaging/dressing  change  Program: Reinforced  How Provided: Verbal, Demonstration  Provided to: Patient  Level of Understanding: Verbalized                Time Calculation:   OT Start Time: 1300  OT Stop Time: 1440  OT Time Calculation (min): 100 min  OT Non-Billable Time (min): 25 min  Total Timed Code Minutes- OT: 100 minute(s)     Therapy Charges for Today     Code Description Service Date Service Provider Modifiers Qty    38933668351 HC OT MANUAL THERAPY EA 15 MIN 12/16/2020 Chely Sierra OT GO, KX 4    28215807023 HC OT LYMPHEDEMA MANAGEMENT-15 MIN 12/16/2020 Chely Sierra, OT KX 1    51284283942 HC OT VASOPNEUMAT DEVIC 1 OR MORE AREAS 12/16/2020 Chely Sierra OT GO, KX 1    74174429588 HC OT SELF CARE/MGMT/TRAIN EA 15 MIN 12/16/2020 Chely Sierra OT GO, KX 1                      Chely Sierra OT  12/16/2020

## 2020-12-18 ENCOUNTER — APPOINTMENT (OUTPATIENT)
Dept: OCCUPATIONAL THERAPY | Facility: HOSPITAL | Age: 55
End: 2020-12-18

## 2020-12-22 ENCOUNTER — HOSPITAL ENCOUNTER (OUTPATIENT)
Dept: OCCUPATIONAL THERAPY | Facility: HOSPITAL | Age: 55
Setting detail: THERAPIES SERIES
Discharge: HOME | End: 2020-12-22

## 2020-12-22 DIAGNOSIS — I89.0 LYMPHEDEMA: ICD-10-CM

## 2020-12-22 DIAGNOSIS — Z98.890 H/O VASCULAR SURGERY: ICD-10-CM

## 2020-12-22 DIAGNOSIS — R60.0 BILATERAL LOWER EXTREMITY EDEMA: Primary | ICD-10-CM

## 2020-12-22 PROCEDURE — 97140 MANUAL THERAPY 1/> REGIONS: CPT

## 2020-12-22 PROCEDURE — 97535 SELF CARE MNGMENT TRAINING: CPT

## 2020-12-22 PROCEDURE — 97139 UNLISTED THERAPEUTIC PX: CPT

## 2020-12-22 PROCEDURE — 97016 VASOPNEUMATIC DEVICE THERAPY: CPT

## 2020-12-22 NOTE — THERAPY TREATMENT NOTE
"Outpatient Occupational Therapy Lymphedema Treatment Note   [place]     Patient Name: El Nielsen  : 1965  MRN: [id number]  Today's Date: 2020      Visit Date: 2020    There is no problem list on file for this patient.       Past Medical History:   Diagnosis Date   • Arthritis    • Elevated cholesterol    • GERD (gastroesophageal reflux disease)    • Hypertension    • Sleep apnea         Past Surgical History:   Procedure Laterality Date   • EYE SURGERY     • VASCULAR SURGERY           Visit Dx:      ICD-10-CM ICD-9-CM   1. Bilateral lower extremity edema  R60.0 782.3   2. Lymphedema  I89.0 457.1   3. H/O vascular surgery  Z98.890 V45.89       Lymphedema     Row Name 20 1000             Subjective Pain    Able to rate subjective pain?  yes  -AB      Pre-Treatment Pain Level  5  -AB      Subjective Pain Comment  BLE's  -AB         Subjective Comments    Subjective Comments  Pt. states that his pain isn't that bad today. He reports that he rode in a car yesterday on \"a trip\", and that could be why his legs are more swollen today.   -AB         Lymphedema Assessment    Lymphedema Classification  RLE:;LLE:;stage 2 (Spontaneously Irreversible);primary abdomen  -AB      Infections or Cellulitis?  no  -AB         Posture/Observations    Posture- WNL  Posture is WNL  -AB         Lymphedema Edema Assessment    Ptting Edema Category  By severity  -AB      Pitting Edema  Severe;+ 4/4  -AB         Skin Changes/Observations    Location/Assessment  Lower Extremity  -AB      Lower Extremity Conditions  bilateral:;dry;hairless;scaly;fragile  -AB      Lower Extremity Color/Pigment  bilateral:;red;blanchable;hyperpigmented;brawny;fibrosis  -AB         Lymphedema Sensation    Lymphedema Sensation Reports  RLE:;LLE:;numbness;tingling  -AB      Lymphedema Sensation Tests  light touch  -AB      Lymphedema Light Touch  RLE:;LLE:;severe impairment  -AB         Lymphedema Measurements    Measurement Type(s) "  Quick Girth  -AB      Quick Girth Areas  Lower extremities  -AB         LLE Quick Girth (cm)    Met-heads  33.5 cm  -AB      Mid foot  34.3 cm  -AB      Smallest ankle  39.6 cm  -AB      Largest calf  60 cm  -AB      Tib tuberosity  52 cm  -AB      Mid patella  61.1 cm  -AB      Distal thigh  68.8 cm  -AB      Other 1  42.9 cm  -AB      Other 2  55.9 cm  -AB         RLE Quick Girth (cm)    Met-heads  34.7 cm  -AB      Mid foot  36.4 cm  -AB      Smallest ankle  42.6 cm  -AB      Largest calf  63.4 cm  -AB      Tib tuberosity  54.8 cm  -AB      Mid patella  60.9 cm  -AB      Distal thigh  69 cm  -AB      Other 1  46.8 cm  -AB      Other 2  54.7 cm  -AB      RLE Quick Girth Total  463.3  -AB         Manual Lymphatic Drainage    Manual Lymphatic Drainage  extremity treatment;opened regional lymph nodes  -AB      Initial Sequence  abdomen  -AB      Abdomen  superficial  -AB      Opened Regional Lymph Nodes  axillary;inguinal  -AB      Axillary  right;left  -AB      Inguinal  right;left  -AB      Extremity Treatment  MLD to full limb  -AB      MLD to Full Limb  BLE's  -AB      Manual Therapy  MLD to BLE's, abdomen, inguinals  -AB         Compression/Skin Care    Compression/Skin Care  skin care;wrapping location;bandaging  -AB      Skin Care  moisturizing lotion applied  -AB      Wrapping Location  lower extremity  -AB      Wrapping Location LE  bilateral: base of toes to below knees  -AB      Bandage Layers  cotton elastic stocking- single layer (comment size);soft foam- 1/4 inch;short-stretch bandages (comment size/quantity)  -AB      Bandaging Technique  circumferential/spiral;moderate compression  -AB      Compression/Skin Care Comments  compression pump to abdomen and BLE's  -AB        User Key  (r) = Recorded By, (t) = Taken By, (c) = Cosigned By    Initials Name Provider Type    AB Chely Sierra, OT Occupational Therapist                                Therapy Education  Given: HEP, Edema management,  Symptoms/condition management, Bandaging/dressing change  Program: Reinforced  How Provided: Verbal, Demonstration  Provided to: Patient  Level of Understanding: Verbalized                Time Calculation:   OT Start Time: 0940  OT Stop Time: 1120  OT Time Calculation (min): 100 min  OT Non-Billable Time (min): 25 min  Total Timed Code Minutes- OT: 100 minute(s)     Therapy Charges for Today     Code Description Service Date Service Provider Modifiers Qty    82812674228 HC OT MANUAL THERAPY EA 15 MIN 12/22/2020 Chely Sierra OT GO, KX 4    03270478592 HC OT LYMPHEDEMA MANAGEMENT-15 MIN 12/22/2020 Chely Sierra OT KX 1    43812654983 HC OT VASOPNEUMAT DEVIC 1 OR MORE AREAS 12/22/2020 Chely Sierra OT JEREMY, KX 1    13784321809 HC OT SELF CARE/MGMT/TRAIN EA 15 MIN 12/22/2020 Chely Sierar OT JEREMY, KX 1                      Chely Sierra OT  12/22/2020

## 2021-01-01 ENCOUNTER — APPOINTMENT (OUTPATIENT)
Dept: OCCUPATIONAL THERAPY | Facility: HOSPITAL | Age: 56
End: 2021-01-01

## 2021-04-16 ENCOUNTER — HOSPITAL ENCOUNTER (OUTPATIENT)
Dept: OCCUPATIONAL THERAPY | Facility: HOSPITAL | Age: 56
Setting detail: THERAPIES SERIES
Discharge: HOME | End: 2021-04-16

## 2021-04-16 DIAGNOSIS — Z98.890 H/O VASCULAR SURGERY: ICD-10-CM

## 2021-04-16 DIAGNOSIS — R60.0 BILATERAL LOWER EXTREMITY EDEMA: ICD-10-CM

## 2021-04-16 DIAGNOSIS — E66.9 OBESITY, UNSPECIFIED CLASSIFICATION, UNSPECIFIED OBESITY TYPE, UNSPECIFIED WHETHER SERIOUS COMORBIDITY PRESENT: Primary | ICD-10-CM

## 2021-04-16 DIAGNOSIS — I89.0 LYMPHEDEMA: ICD-10-CM

## 2021-04-16 PROCEDURE — 97166 OT EVAL MOD COMPLEX 45 MIN: CPT

## 2021-04-16 NOTE — THERAPY EVALUATION
Outpatient Occupational Therapy Lymphedema Initial Evaluation   [place]     Patient Name: El Nielsen  : 1965  MRN: [id number]  Today's Date: 2021      Visit Date: 2021    There is no problem list on file for this patient.       Past Medical History:   Diagnosis Date   • Arthritis    • Elevated cholesterol    • GERD (gastroesophageal reflux disease)    • Hypertension    • Sleep apnea         Past Surgical History:   Procedure Laterality Date   • EYE SURGERY     • MENISCECTOMY Left    • VASCULAR SURGERY           Visit Dx:     ICD-10-CM ICD-9-CM   1. Obesity, unspecified classification, unspecified obesity type, unspecified whether serious comorbidity present  E66.9 278.00   2. H/O vascular surgery  Z98.890 V45.89   3. Lymphedema  I89.0 457.1   4. Bilateral lower extremity edema  R60.0 782.3       Patient History     Row Name 21 0800             History    Chief Complaint  Balance Problems;Burn;Cough;Difficulty Walking;Fatigue/poor endurance;Impaired sensation;Joint swelling;Muscle tenderness;Numbness;Pain;Problems breathing/shortness of breath;Swelling;Tightness;Tinglings  -AB      Type of Pain  Lower Extremity / Leg  -AB      Date Current Problem(s) Began  14  -AB      Brief Description of Current Complaint  BLE swelling, pain, burning  -AB      Previous treatment for THIS PROBLEM  Other (comment) lymphedema therapy  -AB      Patient/Caregiver Goals  Relieve pain;Return to prior level of function;Improve mobility;Know what to do to help the symptoms;Decrease swelling  -AB      Current Tobacco Use  yes  -AB      Smoking Status  current  -AB      Patient's Rating of General Health  Fair  -AB      Hand Dominance  right-handed  -AB      Patient seeing anyone else for problem(s)?  PCP; Cardiovascular MD; pt. reports that he has an appt to see vascular doctor in       How has patient tried to help current problem?  Pt. reports that he still uses sequential compression pump  "every other day, and wraps BLE's about every other day as well.   -AB      Related/Recent Hospitalizations  No  -AB      History of Previous Related Injuries  orthoscopic surgery to L knee X2 per pt. report, meniscectomy  -AB         Pain     Pain Location  Foot;Leg;Knee  -AB      Pain at Present  6  -AB      Pain at Best  4 \"laying in bed at night with feet/legs elevated\"  -AB      Pain at Worst  10  -AB      Pain Frequency  Constant/continuous  -AB      Pain Description  Aching;Burning;Cramping;Discomfort;Heaviness;Numbness;Jabbing;Stabbing;Pins and needles;Pounding;Spasm;Sore;Tender;Throbbing;Tightness;Tingling  -AB      What Performance Factors Make the Current Problem(s) WORSE?  Walking, sitting, standing for long periods of time  -AB      What Performance Factors Make the Current Problem(s) BETTER?  Elevation, pumping, wrapping  -AB      Is your sleep disturbed?  Yes  -AB      Is medication used to assist with sleep?  No  -AB      Total hours of sleep per night  -- Reports that he gets adequate amount of sleep, but broken  -AB      What position do you sleep in?  Supine;Right sidelying  -AB      Difficulties at work?  unable to work currently d/t lymphedema/debility  -AB      Difficulties with ADL's?  reports that he can perform ADL's independently, but requires increased time  -AB      Difficulties with recreational activities?  yes  -AB         Fall Risk Assessment    Any falls in the past year:  No  -AB      Number of falls reported in the last 12 months  none  -AB      Does patient have a fear of falling  No  -AB         Services    Prior Rehab/Home Health Experiences  No  -AB      Are you currently receiving Home Health services  No  -AB      Do you plan to receive Home Health services in the near future  No  -AB         Daily Activities    Primary Language  English  -AB      Patient is concerned about/has problems with  Climbing Stairs;Coordination;Flexibility;Performing home management (household " "chores, shopping, care of dependents);Performing sports, recreation, and play activities;Sitting;Standing;Transfers (getting out of a chair, bed);Walking  -AB      Does patient have problems with the following?  None  -AB      Explanation of Functional Status Problem  Requires assistance donning socks, limited in home management tasks he can perform  -AB      Pt Participated in POC and Goals  Yes  -AB         Safety    Are you being hurt, hit, or frightened by anyone at home or in your life?  No  -AB      Are you being neglected by a caregiver  No  -AB      Have you had any of the following issues with  N/A  -AB        User Key  (r) = Recorded By, (t) = Taken By, (c) = Cosigned By    Initials Name Provider Type    Chely Mckenna OT Occupational Therapist          Lymphedema     Row Name 04/16/21 0800             Subjective Pain    Able to rate subjective pain?  yes  -AB      Pre-Treatment Pain Level  6  -AB      Subjective Pain Comment  BLE's  -AB         Subjective Comments    Subjective Comments  Pt. presents to clinic for initial evaluation. He continues to display s/s of lymphedema, and is struggling with maintaining symptoms at home despite use of sequential compression pump and bandaging.   -AB         Lymphedema Assessment    Lymphedema Classification  RLE:;LLE:;Trunk:;stage 2 (Spontaneously Irreversible);stage 3 (Lymphostatic Elephantiasis);secondary  -AB      Infections or Cellulitis?  no none currently  -AB         Physical Concerns    The amount of pain associated with my lymphedema is:  3  -AB      The amount of limb heaviness associated with my lymphedema is:  4  -AB      The amount of skin tightness associated with my lymphedema is:  4  -AB      The size of my swollen limb(s) seems:  4  -AB      Lymphedema affects the movement of my swollen limb(s):  4  -AB      The strength in my swollen limb(s) is:  2  -AB         Psychosocial Concerns    Lymphedema affects my body image (i.e., \"how I " "think I look\").  4  -AB      Lymphedema affects my socializing with others.  4  -AB      Lymphedema affects my intimate relations with spouse or partner (rate 0 if not applicable  -- deferred  -AB      Lymphedema \"gets me down\" (i.e., depression, frustration, or anger)  3  -AB      I must rely on others for help due to my lymphedema.  3  -AB      I know what to do to manage my lymphedema  0  -AB         Functional Concerns    Lymphedema affects my ability to perform self-care activities (i.e. eating, dressing, hygiene)  1  -AB      Lymphedema affects my ability to perform routine home or work-related activities.  3  -AB      Lymphedema affects my performance of preferred leisure activities.  3  -AB      Lymphedema affects proper fit of clothing/shoes  4  -AB      Lymphedema affects my sleep  3  -AB         Posture/Observations    Posture- WNL  Posture is WNL  -AB      Observations  Edema  -AB         General ROM    GENERAL ROM COMMENTS  BUE AROM WFL, BLE AROM impaired ~25-50%  -AB         Lymphedema Edema Assessment    Ptting Edema Category  By grade out of 4;By severity  -AB      Pitting Edema  Severe;+ 4/4  -AB      Stemmer Sign  bilateral:;positive  -AB         Skin Changes/Observations    Location/Assessment  Lower Extremity  -AB      Lower Extremity Conditions  bilateral:;dry;hairless;scaly;crust  -AB      Lower Extremity Color/Pigment  bilateral:;purple;red;blanchable;non-blanchable;hyperpigmented;brawny;woody;fibrosis  -AB         Lymphedema Sensation    Lymphedema Sensation Reports  RLE:;LLE:;numbness;tingling  -AB      Lymphedema Sensation Tests  light touch;deep touch  -AB      Lymphedema Light Touch  absent sensation;LLE:;RLE:  -AB      Lymphedema Deep Touch  RLE:;LLE:;severe impairment RLE more impaired  -AB         Lymphedema Pulses/Capillary Refill    Lymphedema Pulses/Capillary Refill  capillary refill  -AB      Capillary Refill  lower extremity capillary refill  -AB      Lower Extremity Capillary " Refill  left:;right:;greater than 3 seconds  -AB         Lymphedema Measurements    Measurement Type(s)  Quick Girth;Circumferential  -AB      Quick Girth Areas  Lower extremities  -AB      Circumferential Areas  Trunk  -AB         LLE Quick Girth (cm)    Met-heads  34.1 cm  -AB      Mid foot  33.8 cm  -AB      Smallest ankle  40.6 cm  -AB      Largest calf  59.1 cm  -AB      Tib tuberosity  50.2 cm  -AB      Mid patella  57 cm  -AB      Distal thigh  67.3 cm  -AB      Proximal thigh  73.1 cm  -AB      Other 1  40.8 cm  -AB      Other 2  47.7 cm  -AB         RLE Quick Girth (cm)    Met-heads  34.2 cm  -AB      Mid foot  36.7 cm  -AB      Smallest ankle  44.7 cm  -AB      Largest calf  60.7 cm  -AB      Tib tuberosity  51.2 cm  -AB      Mid patella  56 cm  -AB      Distal thigh  66 cm  -AB      Proximal thigh  73 cm  -AB      Other 1  45.4 cm  -AB      Other 2  49.2 cm  -AB      RLE Quick Girth Total  517.1  -AB         Trunk Circumferential (cm)    Measurement Location 1  Chest  -AB      Trunk 1  164 cm  -AB      Measurement Location 2  Navel  -AB      Trunk 2  178 cm  -AB      Trunk Circumferential Total  342 cm  -AB         Compression/Skin Care    Compression/Skin Care  bandaging;skin care  -AB      Skin Care  moisturizing lotion applied  -AB      Bandaging Comments  tg  size E to bilateral feet/ankles, tg soft size Large to bilateral ankles to below knee  -AB         Lymphedema Life Impact Scale Totals    A.  Total Q1 - Q17 (Do not include Q18)  49  -AB      B.  Total number of questions answered (Q1-Q17)  15  -AB      C. Divide A by B  3.27  -AB      D. Multiple C by 25  81.75  -AB        User Key  (r) = Recorded By, (t) = Taken By, (c) = Cosigned By    Initials Name Provider Type    Chely Mckenna, OT Occupational Therapist                  Therapy Education  Given: HEP, Edema management, Symptoms/condition management, Bandaging/dressing change  Program: Reinforced  How Provided: Verbal,  Demonstration  Provided to: Patient  Level of Understanding: Verbalized        OT Goals     Row Name 04/16/21 1100          OT Short Term Goals    STG Date to Achieve  05/16/21  -AB     STG 1  Pt. will decrease pitting edema to 3to4/4 to decrease risk of infection.   -AB     STG 2  Pt. will reduce overall girth by 5cm to decrease risk of infection.   -AB     STG 3  Pt. will be more compliant w/ HEP to assist w/ lymphatic flow.   -AB        Long Term Goals    LTG Date to Achieve  07/16/21  -AB     LTG 1  Pt. will decrease pitting edema to 3/4 to decrease risk of infection.   -AB     LTG 2  Pt. will reduce overall girth by 10cm to decrease risk of infection.   -AB     LTG 3  Pt. will be independent w/ donning of reduction kit.   -AB        Time Calculation    OT Goal Re-Cert Due Date  07/16/21  -AB       User Key  (r) = Recorded By, (t) = Taken By, (c) = Cosigned By    Initials Name Provider Type    Chely Mckenna, OT Occupational Therapist          OT Assessment/Plan     Row Name 04/16/21 1146          OT Assessment    Functional Limitations  Decreased safety during functional activities;Limitations in functional capacity and performance;Impaired gait;Performance in leisure activities;Performance in self-care ADL;Limitation in home management;Performance in work activities  -AB     Impairments  Balance;Coordination;Edema;Endurance;Gait;Impaired aerobic capacity;Impaired flexibility;Impaired lymphatic circulation;Impaired muscle endurance;Impaired sensory integrity;Pain;Range of motion;Sensation  -AB     OT Diagnosis  Lymphedema  -AB     OT Rehab Potential  Good  -AB     Patient/caregiver participated in establishment of treatment plan and goals  Yes  -AB     Patient would benefit from skilled therapy intervention  Yes  -AB        OT Plan    OT Frequency  2x/week  -AB     Predicted Duration of Therapy Intervention (OT)  90 days  -AB     Planned CPT's?  OT EVAL MOD COMPLEXITY: 09142;OT RE-EVAL: 11098;OT  THER ACT EA 15 MIN: 96081JZ;OT THER PROC EA 15 MIN: 17895VX;OT SELF CARE/MGMT/TRAIN 15 MIN: 64891;OT MANUAL THERAPY EA 15 MIN: 97249;OT BIS XTRACELL FLUID ANALYSIS: 95838;OT VASOPNEUMATIC DEVICE: 08373 lymphedema management  -AB     OT Plan Comments  Pt. will benefit from skilled OT services to address ongoing s/s of lymphedema in BLE's, requiring tx 2x/week for approximately 90 days.  -AB       User Key  (r) = Recorded By, (t) = Taken By, (c) = Cosigned By    Initials Name Provider Type    Chely Mckenna, OT Occupational Therapist                    Time Calculation:   OT Start Time: 0810  OT Stop Time: 0930  OT Time Calculation (min): 80 min  OT Non-Billable Time (min): 60 min  Total Timed Code Minutes- OT: 80 minute(s)     Therapy Charges for Today     Code Description Service Date Service Provider Modifiers Qty    16889472189 HC OT EVAL MOD COMPLEXITY 4 4/16/2021 Chely Sierra OT GO, KX 1                    Chely Sierra OT  4/16/2021

## 2021-05-04 ENCOUNTER — HOSPITAL ENCOUNTER (OUTPATIENT)
Dept: OCCUPATIONAL THERAPY | Facility: HOSPITAL | Age: 56
Setting detail: THERAPIES SERIES
Discharge: HOME | End: 2021-05-04

## 2021-05-04 DIAGNOSIS — E66.9 OBESITY, UNSPECIFIED CLASSIFICATION, UNSPECIFIED OBESITY TYPE, UNSPECIFIED WHETHER SERIOUS COMORBIDITY PRESENT: Primary | ICD-10-CM

## 2021-05-04 DIAGNOSIS — I89.0 LYMPHEDEMA: ICD-10-CM

## 2021-05-04 DIAGNOSIS — Z98.890 H/O VASCULAR SURGERY: ICD-10-CM

## 2021-05-04 DIAGNOSIS — R60.0 BILATERAL LOWER EXTREMITY EDEMA: ICD-10-CM

## 2021-05-04 PROCEDURE — 97139 UNLISTED THERAPEUTIC PX: CPT

## 2021-05-04 PROCEDURE — 97140 MANUAL THERAPY 1/> REGIONS: CPT

## 2021-05-04 PROCEDURE — 97016 VASOPNEUMATIC DEVICE THERAPY: CPT

## 2021-05-04 PROCEDURE — 97535 SELF CARE MNGMENT TRAINING: CPT

## 2021-05-04 NOTE — THERAPY TREATMENT NOTE
Outpatient Occupational Therapy Lymphedema Treatment Note  [facility]     Patient Name: El Nielsen  : 1965  MRN: [id number]  Today's Date: 2021      Visit Date: 2021    There is no problem list on file for this patient.       Past Medical History:   Diagnosis Date   • Arthritis    • Elevated cholesterol    • GERD (gastroesophageal reflux disease)    • Hypertension    • Sleep apnea         Past Surgical History:   Procedure Laterality Date   • EYE SURGERY     • MENISCECTOMY Left    • VASCULAR SURGERY           Visit Dx:      ICD-10-CM ICD-9-CM   1. Obesity, unspecified classification, unspecified obesity type, unspecified whether serious comorbidity present  E66.9 278.00   2. H/O vascular surgery  Z98.890 V45.89   3. Lymphedema  I89.0 457.1   4. Bilateral lower extremity edema  R60.0 782.3       Lymphedema     Row Name 21 1000             Subjective Pain    Able to rate subjective pain?  yes  -AB      Pre-Treatment Pain Level  7  -AB      Subjective Pain Comment  BLE's  -AB         Subjective Comments    Subjective Comments  Pt. presents to tx this date w/ no new c/o. He reports that his feet are more sensitive today.   -AB         Lymphedema Assessment    Lymphedema Classification  RLE:;LLE:;Trunk:;stage 2 (Spontaneously Irreversible);stage 3 (Lymphostatic Elephantiasis);secondary  -AB      Infections or Cellulitis?  no none currently  -AB         Posture/Observations    Posture- WNL  Posture is WNL  -AB      Observations  Edema  -AB         Lymphedema Edema Assessment    Ptting Edema Category  By grade out of 4;By severity  -AB      Pitting Edema  Severe;+ 4/4  -AB      Stemmer Sign  bilateral:;positive  -AB         Skin Changes/Observations    Location/Assessment  Lower Extremity  -AB      Lower Extremity Conditions  bilateral:;dry;hairless;scaly;crust  -AB      Lower Extremity Color/Pigment  bilateral:;purple;red;blanchable;non-blanchable;hyperpigmented;brawny;woody;fibrosis  -AB          Lymphedema Sensation    Lymphedema Sensation Reports  RLE:;LLE:;numbness;tingling  -AB      Lymphedema Sensation Tests  light touch;deep touch  -AB      Lymphedema Light Touch  absent sensation;LLE:;RLE:  -AB      Lymphedema Deep Touch  RLE:;LLE:;severe impairment RLE more impaired  -AB         Lymphedema Pulses/Capillary Refill    Lymphedema Pulses/Capillary Refill  capillary refill  -AB      Capillary Refill  lower extremity capillary refill  -AB      Lower Extremity Capillary Refill  left:;right:;greater than 3 seconds  -AB         Lymphedema Measurements    Measurement Type(s)  Quick Girth;Circumferential  -AB      Quick Girth Areas  Lower extremities  -AB      Circumferential Areas  Trunk  -AB         LLE Quick Girth (cm)    Met-heads  35.3 cm  -AB      Mid foot  36 cm  -AB      Smallest ankle  40 cm  -AB      Largest calf  59.9 cm  -AB      Tib tuberosity  53.5 cm  -AB      Mid patella  63.9 cm  -AB      Distal thigh  70 cm  -AB      Other 1  41.5 cm  -AB      Other 2  49 cm  -AB         RLE Quick Girth (cm)    Met-heads  36.3 cm  -AB      Mid foot  37.5 cm  -AB      Smallest ankle  42.8 cm  -AB      Largest calf  60.9 cm  -AB      Tib tuberosity  54 cm  -AB      Mid patella  60.9 cm  -AB      Distal thigh  66.8 cm  -AB      Other 1  44.3 cm  -AB      Other 2  50.8 cm  -AB      RLE Quick Girth Total  454.3  -AB         Trunk Circumferential (cm)    Measurement Location 1  Chest  -AB      Measurement Location 2  Navel  -AB         Compression/Skin Care    Compression/Skin Care  bandaging;skin care;wrapping location  -AB      Skin Care  moisturizing lotion applied  -AB      Wrapping Location  lower extremity  -AB      Wrapping Location LE  bilateral: toes to knees  -AB      Bandage Layers  short-stretch bandages (comment size/quantity);cotton elastic stocking- single layer (comment size);soft foam- 1/4 inch  -AB      Bandaging Technique  circumferential/spiral;light compression;moderate compression  -AB       Compression/Skin Care Comments  compression pump to abdomen and BLE's  -AB        User Key  (r) = Recorded By, (t) = Taken By, (c) = Cosigned By    Initials Name Provider Type    Chely Mckenna OT Occupational Therapist                                Therapy Education  Given: HEP, Edema management, Symptoms/condition management, Bandaging/dressing change  Program: Reinforced  How Provided: Verbal, Demonstration  Provided to: Patient  Level of Understanding: Verbalized                Time Calculation:   OT Start Time: 0940  OT Stop Time: 1130  OT Time Calculation (min): 110 min  OT Non-Billable Time (min): 25 min  Total Timed Code Minutes- OT: 110 minute(s)     Therapy Charges for Today     Code Description Service Date Service Provider Modifiers Qty    30722694389 HC OT MANUAL THERAPY EA 15 MIN 5/4/2021 Chely Sierra OT EFFIE LUNAX 4    [id number]  OT LYMPHEDEMA MANAGEMENT-15 MIN 5/4/2021 Chely Sierra OT KX 1    52727191108  OT VASOPNEUMAT DEVIC 1 OR MORE AREAS 5/4/2021 Chely Sierra OT JEREMY, KX 1    35228795809 HC OT SELF CARE/MGMT/TRAIN EA 15 MIN 5/4/2021 Chely Sierra OT JEREMY KX 1                      Chely Sierra OT  5/4/2021

## 2021-05-06 ENCOUNTER — HOSPITAL ENCOUNTER (OUTPATIENT)
Dept: OCCUPATIONAL THERAPY | Facility: HOSPITAL | Age: 56
Setting detail: THERAPIES SERIES
Discharge: HOME | End: 2021-05-06

## 2021-05-06 DIAGNOSIS — E66.9 OBESITY, UNSPECIFIED CLASSIFICATION, UNSPECIFIED OBESITY TYPE, UNSPECIFIED WHETHER SERIOUS COMORBIDITY PRESENT: Primary | ICD-10-CM

## 2021-05-06 DIAGNOSIS — I89.0 LYMPHEDEMA: ICD-10-CM

## 2021-05-06 DIAGNOSIS — Z98.890 H/O VASCULAR SURGERY: ICD-10-CM

## 2021-05-06 DIAGNOSIS — R60.0 BILATERAL LOWER EXTREMITY EDEMA: ICD-10-CM

## 2021-05-06 PROCEDURE — 97140 MANUAL THERAPY 1/> REGIONS: CPT

## 2021-05-06 PROCEDURE — 97535 SELF CARE MNGMENT TRAINING: CPT

## 2021-05-06 PROCEDURE — 97139 UNLISTED THERAPEUTIC PX: CPT

## 2021-05-06 PROCEDURE — 97016 VASOPNEUMATIC DEVICE THERAPY: CPT

## 2021-05-06 NOTE — THERAPY TREATMENT NOTE
Outpatient Occupational Therapy Lymphedema Treatment Note  CASSANDRA Alonso     Patient Name: El Nielsen  : 1965  MRN: [id number]  Today's Date: 2021      Visit Date: 2021    There is no problem list on file for this patient.       Past Medical History:   Diagnosis Date   • Arthritis    • Elevated cholesterol    • GERD (gastroesophageal reflux disease)    • Hypertension    • Sleep apnea         Past Surgical History:   Procedure Laterality Date   • EYE SURGERY     • MENISCECTOMY Left    • VASCULAR SURGERY           Visit Dx:      ICD-10-CM ICD-9-CM   1. Obesity, unspecified classification, unspecified obesity type, unspecified whether serious comorbidity present  E66.9 278.00   2. H/O vascular surgery  Z98.890 V45.89   3. Lymphedema  I89.0 457.1   4. Bilateral lower extremity edema  R60.0 782.3       Lymphedema     Row Name 21 1000             Subjective Pain    Able to rate subjective pain?  yes  -BC      Pre-Treatment Pain Level  3  -BC      Subjective Pain Comment  Mild   -BC         Subjective Comments    Subjective Comments  Pt. presents this date w/no new complaints voiced.  -BC         Lymphedema Assessment    Lymphedema Classification  RLE:;LLE:;Trunk:;stage 2 (Spontaneously Irreversible);stage 3 (Lymphostatic Elephantiasis);secondary  -BC      Infections or Cellulitis?  no none currently  -BC         Posture/Observations    Posture- WNL  Posture is WNL  -BC      Observations  Edema  -BC         Lymphedema Edema Assessment    Ptting Edema Category  By grade out of 4;By severity  -BC      Pitting Edema  Severe;+ 4/4  -BC      Stemmer Sign  bilateral:;positive  -BC         Skin Changes/Observations    Location/Assessment  Lower Extremity  -BC      Lower Extremity Conditions  bilateral:;dry;hairless;scaly;crust  -BC      Lower Extremity Color/Pigment  bilateral:;purple;red;blanchable;non-blanchable;hyperpigmented;brawny;woody;fibrosis  -BC      Skin Observations Comment  Dry/scaly/dark  red/purple in coloration.  -BC         Lymphedema Sensation    Lymphedema Sensation Reports  RLE:;LLE:;numbness;tingling  -BC      Lymphedema Sensation Tests  light touch;deep touch  -BC      Lymphedema Light Touch  absent sensation;LLE:;RLE:  -BC      Lymphedema Deep Touch  RLE:;LLE:;severe impairment RLE more impaired  -BC      Lymphedema Sensation Comments  hyper sensitive on all toes.  -BC         Lymphedema Pulses/Capillary Refill    Lymphedema Pulses/Capillary Refill  capillary refill  -BC      Capillary Refill  lower extremity capillary refill  -BC      Lower Extremity Capillary Refill  left:;right:;greater than 3 seconds  -BC         Lymphedema Measurements    Measurement Type(s)  Quick Girth;Circumferential  -BC      Quick Girth Areas  Lower extremities  -BC      Circumferential Areas  Trunk  -BC         LLE Quick Girth (cm)    Met-heads  31.3 cm  -BC      Mid foot  35 cm  -BC      Smallest ankle  40.6 cm  -BC      Largest calf  56.5 cm  -BC      Tib tuberosity  51.4 cm  -BC      Mid patella  59 cm  -BC      Distal thigh  68.7 cm  -BC      Other 1  40.6 cm  -BC      Other 2  48.4 cm  -BC         RLE Quick Girth (cm)    Met-heads  33 cm  -BC      Mid foot  36.1 cm  -BC      Smallest ankle  41.5 cm  -BC      Largest calf  57 cm  -BC      Tib tuberosity  51 cm  -BC      Mid patella  61.4 cm  -BC      Distal thigh  67.6 cm  -BC      Other 1  41.8 cm  -BC      Other 2  48.2 cm  -BC      RLE Quick Girth Total  437.6  -BC         Trunk Circumferential (cm)    Measurement Location 1  Chest  -BC      Measurement Location 2  Navel  -BC         Compression/Skin Care    Compression/Skin Care  bandaging;skin care;wrapping location  -BC      Skin Care  moisturizing lotion applied  -BC      Wrapping Location  lower extremity  -BC      Wrapping Location LE  bilateral: toes to knees  -BC      Bandage Layers  short-stretch bandages (comment size/quantity);cotton elastic stocking- single layer (comment size);soft foam- 1/4  inch  -BC      Bandaging Technique  circumferential/spiral;light compression;moderate compression  -BC      Compression/Skin Care Comments  Compression pump x Abd., BLE's  -BC        User Key  (r) = Recorded By, (t) = Taken By, (c) = Cosigned By    Initials Name Provider Type    Vera Mitchell OT Occupational Therapist                  OT Assessment/Plan     Row Name 05/06/21 1626          OT Assessment    Assessment Comments  Pt. positioned in supported recline for manual lymph drainage, compression pump, skin care, multi layered bandaging of BLE's, toes to below knees.  -BC       User Key  (r) = Recorded By, (t) = Taken By, (c) = Cosigned By    Initials Name Provider Type    Vera Mitchell OT Occupational Therapist                    Therapy Education  Given: Symptoms/condition management  Program: Reinforced  How Provided: Verbal  Provided to: Patient  Level of Understanding: Verbalized                Time Calculation:   OT Start Time: 0930  OT Stop Time: 1130  OT Time Calculation (min): 120 min  OT Non-Billable Time (min): 30 min     Therapy Charges for Today     Code Description Service Date Service Provider Modifiers Qty    36919227130 HC OT MANUAL THERAPY EA 15 MIN 5/6/2021 Vera Bowman OT GO KX 3    99790028225 HC OT SELF CARE/MGMT/TRAIN EA 15 MIN 5/6/2021 Vera Bowman OT GO, KX 3    84006555947 HC OT LYMPHEDEMA MANAGEMENT-15 MIN 5/6/2021 Vera Bowman OT KX 1    75497730673 HC OT VASOPNEUMAT DEVIC 1 OR MORE AREAS 5/6/2021 Vera Bowman OT GO KX 1                      Vera Bowman OT  5/6/2021

## 2021-05-11 ENCOUNTER — HOSPITAL ENCOUNTER (OUTPATIENT)
Dept: OCCUPATIONAL THERAPY | Facility: HOSPITAL | Age: 56
Setting detail: THERAPIES SERIES
Discharge: HOME | End: 2021-05-11

## 2021-05-11 DIAGNOSIS — E66.9 OBESITY, UNSPECIFIED CLASSIFICATION, UNSPECIFIED OBESITY TYPE, UNSPECIFIED WHETHER SERIOUS COMORBIDITY PRESENT: Primary | ICD-10-CM

## 2021-05-11 DIAGNOSIS — I89.0 LYMPHEDEMA: ICD-10-CM

## 2021-05-11 DIAGNOSIS — Z98.890 H/O VASCULAR SURGERY: ICD-10-CM

## 2021-05-11 DIAGNOSIS — R60.0 BILATERAL LOWER EXTREMITY EDEMA: ICD-10-CM

## 2021-05-11 PROCEDURE — 97016 VASOPNEUMATIC DEVICE THERAPY: CPT

## 2021-05-11 PROCEDURE — 97535 SELF CARE MNGMENT TRAINING: CPT

## 2021-05-11 PROCEDURE — 97139 UNLISTED THERAPEUTIC PX: CPT

## 2021-05-11 PROCEDURE — 97140 MANUAL THERAPY 1/> REGIONS: CPT

## 2021-05-11 NOTE — THERAPY TREATMENT NOTE
Outpatient Occupational Therapy Lymphedema Treatment Note   Gavin     Patient Name: El Nielsen  : 1965  MRN: [id number]  Today's Date: 2021      Visit Date: 2021    There is no problem list on file for this patient.       Past Medical History:   Diagnosis Date   • Arthritis    • Elevated cholesterol    • GERD (gastroesophageal reflux disease)    • Hypertension    • Sleep apnea         Past Surgical History:   Procedure Laterality Date   • EYE SURGERY     • MENISCECTOMY Left    • VASCULAR SURGERY           Visit Dx:      ICD-10-CM ICD-9-CM   1. Obesity, unspecified classification, unspecified obesity type, unspecified whether serious comorbidity present  E66.9 278.00   2. H/O vascular surgery  Z98.890 V45.89   3. Lymphedema  I89.0 457.1   4. Bilateral lower extremity edema  R60.0 782.3       Lymphedema     Row Name 21 0900             Subjective Pain    Able to rate subjective pain?  yes  -BC      Pre-Treatment Pain Level  2  -BC      Subjective Pain Comment  BLE's  -BC         Subjective Comments    Subjective Comments  Pt. presents this date reporting that he had wore his bandages until Sat. morning on his legs .  -BC         Lymphedema Assessment    Lymphedema Classification  RLE:;LLE:;Trunk:;stage 2 (Spontaneously Irreversible);stage 3 (Lymphostatic Elephantiasis);secondary  -BC      Infections or Cellulitis?  no none currently  -BC         Posture/Observations    Posture- WNL  Posture is WNL  -BC      Observations  Edema  -BC         Lymphedema Edema Assessment    Ptting Edema Category  By grade out of 4;By severity  -BC      Pitting Edema  Severe;+ 4/4  -BC      Stemmer Sign  bilateral:;positive  -BC         Skin Changes/Observations    Location/Assessment  Lower Extremity  -BC      Lower Extremity Conditions  bilateral:;dry;hairless;scaly;crust  -BC      Lower Extremity Color/Pigment  bilateral:;purple;red;blanchable;non-blanchable;hyperpigmented;brawny;woody;fibrosis  -BC       Skin Observations Comment  Discoloration continues.  -BC         Lymphedema Sensation    Lymphedema Sensation Reports  RLE:;LLE:;numbness;tingling  -BC      Lymphedema Sensation Tests  light touch;deep touch  -BC      Lymphedema Light Touch  absent sensation;LLE:;RLE:  -BC      Lymphedema Deep Touch  RLE:;LLE:;severe impairment RLE more impaired  -BC      Lymphedema Sensation Comments  Hyper sensitivity continues on B feet/toes.  -BC         Lymphedema Pulses/Capillary Refill    Lymphedema Pulses/Capillary Refill  capillary refill  -BC      Capillary Refill  lower extremity capillary refill  -BC      Lower Extremity Capillary Refill  left:;right:;greater than 3 seconds  -BC         Lymphedema Measurements    Measurement Type(s)  Quick Girth;Circumferential  -BC      Quick Girth Areas  Lower extremities  -BC      Circumferential Areas  Trunk  -BC         LLE Quick Girth (cm)    Met-heads  31.2 cm  -BC      Mid foot  33.8 cm  -BC      Smallest ankle  36.7 cm  -BC      Largest calf  55.9 cm  -BC      Tib tuberosity  49.9 cm  -BC      Mid patella  59 cm  -BC      Distal thigh  67.2 cm  -BC      Other 1  37.8 cm  -BC      Other 2  45.5 cm  -BC         RLE Quick Girth (cm)    Met-heads  31.2 cm  -BC      Mid foot  35.8 cm  -BC      Smallest ankle  38.8 cm  -BC      Largest calf  56.8 cm  -BC      Tib tuberosity  51.4 cm  -BC      Mid patella  57.5 cm  -BC      Distal thigh  66.5 cm  -BC      Other 1  41.1 cm  -BC      Other 2  46.7 cm  -BC      RLE Quick Girth Total  425.8  -BC         Trunk Circumferential (cm)    Measurement Location 1  Chest  -BC      Measurement Location 2  Navel  -BC         Compression/Skin Care    Compression/Skin Care  bandaging;skin care;wrapping location  -BC      Skin Care  moisturizing lotion applied  -BC      Wrapping Location  lower extremity  -BC      Wrapping Location LE  bilateral: toes to knees  -BC      Bandage Layers  short-stretch bandages (comment size/quantity);cotton elastic  stocking- single layer (comment size);soft foam- 1/4 inch  -BC      Bandaging Technique  circumferential/spiral;light compression;moderate compression  -BC      Compression/Skin Care Comments  Compression pump x Abd., BLE's  -BC        User Key  (r) = Recorded By, (t) = Taken By, (c) = Cosigned By    Initials Name Provider Type    Vera Mitchell OT Occupational Therapist                  OT Assessment/Plan     Row Name 05/11/21 1325          OT Assessment    Assessment Comments  Pt. positioned in supported recline for manual lymph drainage, compression pump, skin care, and multi layered bandaging of BLE's, toes to below knees.  -BC       User Key  (r) = Recorded By, (t) = Taken By, (c) = Cosigned By    Initials Name Provider Type    Vera Mitchell OT Occupational Therapist                    Therapy Education  Given: Symptoms/condition management  Program: Reinforced  How Provided: Verbal  Provided to: Patient  Level of Understanding: Verbalized                Time Calculation:   OT Start Time: 0830  OT Stop Time: 1045  OT Time Calculation (min): 135 min     Therapy Charges for Today     Code Description Service Date Service Provider Modifiers Qty    40880005303 HC OT MANUAL THERAPY EA 15 MIN 5/11/2021 Vera Bowman OT GO, KX 4    61428638898 HC OT LYMPHEDEMA MANAGEMENT-15 MIN 5/11/2021 Vera Bowman OT KX 1    64862745257 HC OT SELF CARE/MGMT/TRAIN EA 15 MIN 5/11/2021 Vera Bowman OT GO, KX 2    75839143573 HC OT VASOPNEUMAT DEVIC 1 OR MORE AREAS 5/11/2021 Vera Bowman OT GO, KX 1                      Vera Bowman OT  5/11/2021

## 2021-05-13 ENCOUNTER — APPOINTMENT (OUTPATIENT)
Dept: OCCUPATIONAL THERAPY | Facility: HOSPITAL | Age: 56
End: 2021-05-13

## 2021-05-18 ENCOUNTER — HOSPITAL ENCOUNTER (OUTPATIENT)
Dept: OCCUPATIONAL THERAPY | Facility: HOSPITAL | Age: 56
Setting detail: THERAPIES SERIES
Discharge: HOME | End: 2021-05-18

## 2021-05-18 DIAGNOSIS — E66.9 OBESITY, UNSPECIFIED CLASSIFICATION, UNSPECIFIED OBESITY TYPE, UNSPECIFIED WHETHER SERIOUS COMORBIDITY PRESENT: Primary | ICD-10-CM

## 2021-05-18 DIAGNOSIS — R60.0 BILATERAL LOWER EXTREMITY EDEMA: ICD-10-CM

## 2021-05-18 DIAGNOSIS — I89.0 LYMPHEDEMA: ICD-10-CM

## 2021-05-18 DIAGNOSIS — Z98.890 H/O VASCULAR SURGERY: ICD-10-CM

## 2021-05-18 PROCEDURE — 97016 VASOPNEUMATIC DEVICE THERAPY: CPT

## 2021-05-18 PROCEDURE — 97139 UNLISTED THERAPEUTIC PX: CPT

## 2021-05-18 PROCEDURE — 97535 SELF CARE MNGMENT TRAINING: CPT

## 2021-05-18 PROCEDURE — 97140 MANUAL THERAPY 1/> REGIONS: CPT

## 2021-05-18 NOTE — THERAPY PROGRESS REPORT/RE-CERT
Outpatient Occupational Therapy Lymphedema Progress Note  CASSANDRA Alonso     Patient Name: El Nielsen  : 1965  MRN: [id number]  Today's Date: 2021      Visit Date: 2021    There is no problem list on file for this patient.       Past Medical History:   Diagnosis Date   • Arthritis    • Elevated cholesterol    • GERD (gastroesophageal reflux disease)    • Hypertension    • Sleep apnea         Past Surgical History:   Procedure Laterality Date   • EYE SURGERY     • MENISCECTOMY Left    • VASCULAR SURGERY           Visit Dx:      ICD-10-CM ICD-9-CM   1. Obesity, unspecified classification, unspecified obesity type, unspecified whether serious comorbidity present  E66.9 278.00   2. H/O vascular surgery  Z98.890 V45.89   3. Lymphedema  I89.0 457.1   4. Bilateral lower extremity edema  R60.0 782.3       Lymphedema     Row Name 21 1000             Subjective Pain    Able to rate subjective pain?  yes  -AB      Pre-Treatment Pain Level  5  -AB      Subjective Pain Comment  BLE's  -AB         Subjective Comments    Subjective Comments  Pt. presents to tx this date w/ no new c/o voiced.   -AB         Lymphedema Assessment    Lymphedema Classification  RLE:;LLE:;Trunk:;stage 2 (Spontaneously Irreversible);stage 3 (Lymphostatic Elephantiasis);secondary  -AB      Infections or Cellulitis?  no none currently  -AB         Posture/Observations    Posture- WNL  Posture is WNL  -AB      Observations  Edema  -AB         Lymphedema Edema Assessment    Ptting Edema Category  By grade out of 4;By severity  -AB      Pitting Edema  Severe;+ 4/4  -AB      Stemmer Sign  bilateral:;positive  -AB      Edema Assessment Comment  pitting edema much improved to bilateral feet, 2to3/4  -AB         Skin Changes/Observations    Location/Assessment  Lower Extremity  -AB      Lower Extremity Conditions  bilateral:;dry;hairless;scaly;crust  -AB      Lower Extremity Color/Pigment   bilateral:;purple;red;blanchable;non-blanchable;hyperpigmented;brawny;woody;fibrosis  -AB         Lymphedema Sensation    Lymphedema Sensation Reports  RLE:;LLE:;numbness;tingling  -AB      Lymphedema Sensation Tests  light touch;deep touch  -AB      Lymphedema Light Touch  absent sensation;LLE:;RLE:  -AB      Lymphedema Deep Touch  RLE:;LLE:;severe impairment RLE more impaired  -AB         Lymphedema Pulses/Capillary Refill    Lymphedema Pulses/Capillary Refill  capillary refill  -AB      Capillary Refill  lower extremity capillary refill  -AB      Lower Extremity Capillary Refill  left:;right:;greater than 3 seconds  -AB         Lymphedema Measurements    Measurement Type(s)  Quick Girth;Circumferential  -AB      Quick Girth Areas  Lower extremities  -AB      Circumferential Areas  Trunk  -AB         LLE Quick Girth (cm)    Met-heads  32.9 cm  -AB      Mid foot  32.9 cm  -AB      Smallest ankle  40.3 cm  -AB      Largest calf  56.7 cm  -AB      Tib tuberosity  49.5 cm  -AB      Mid patella  60.7 cm  -AB      Distal thigh  68.1 cm  -AB      Other 1  40.7 cm  -AB      Other 2  46.3 cm  -AB         RLE Quick Girth (cm)    Met-heads  35.4 cm  -AB      Mid foot  36.5 cm  -AB      Smallest ankle  42.3 cm  -AB      Largest calf  61.3 cm  -AB      Tib tuberosity  53.4 cm  -AB      Mid patella  60.8 cm  -AB      Distal thigh  66.7 cm  -AB      Other 1  44.7 cm  -AB      Other 2  49 cm  -AB      RLE Quick Girth Total  450.1  -AB         Trunk Circumferential (cm)    Measurement Location 1  Chest  -AB      Measurement Location 2  Navel  -AB         Compression/Skin Care    Compression/Skin Care  bandaging;skin care;wrapping location  -AB      Skin Care  moisturizing lotion applied  -AB      Wrapping Location  lower extremity  -AB      Wrapping Location LE  bilateral: base of toes to knees  -AB      Bandage Layers  short-stretch bandages (comment size/quantity);cotton elastic stocking- single layer (comment size);soft foam-  1/4 inch  -AB      Bandaging Technique  circumferential/spiral;moderate compression  -AB      Compression/Skin Care Comments  compression pump to abdomen and BLE's  -AB        User Key  (r) = Recorded By, (t) = Taken By, (c) = Cosigned By    Initials Name Provider Type    Chely Mckenna OT Occupational Therapist                  OT Assessment/Plan     Row Name 05/18/21 1535          OT Assessment    OT Rehab Potential  Good  -AB     Patient/caregiver participated in establishment of treatment plan and goals  Yes  -AB     Patient would benefit from skilled therapy intervention  Yes  -AB        OT Plan    OT Frequency  2x/week  -AB     Predicted Duration of Therapy Intervention (OT)  30 days  -AB     Planned Therapy Interventions (Optional Details)  home exercise program;patient/family education;manual therapy techniques  -AB     OT Plan Comments  Pt. will benefit from continued skilled OT services to address ongoing s/s of lymphedema. Continue POC.  -AB       User Key  (r) = Recorded By, (t) = Taken By, (c) = Cosigned By    Initials Name Provider Type    Chely Mckenna OT Occupational Therapist                OT Goals     Row Name 05/18/21 1500          OT Short Term Goals    STG Date to Achieve  06/16/21  -AB     STG 1  Pt. will decrease pitting edema to 3to4/4 to decrease risk of infection.   -AB     STG 1 Progress  Partially Met;Ongoing  -AB     STG 2  Pt. will reduce overall girth by 5cm to decrease risk of infection.   -AB     STG 2 Progress  Ongoing  -AB     STG 3  Pt. will be more compliant w/ HEP to assist w/ lymphatic flow.   -AB     STG 3 Progress  Partially Met;Ongoing  -AB        Long Term Goals    LTG Date to Achieve  07/16/21  -AB     LTG 1  Pt. will decrease pitting edema to 3/4 to decrease risk of infection.   -AB     LTG 1 Progress  Ongoing  -AB     LTG 2  Pt. will reduce overall girth by 10cm to decrease risk of infection.   -AB     LTG 2 Progress  Ongoing  -AB     LTG 3   Pt. will be independent w/ donning of reduction kit.   -AB     LTG 3 Progress  Ongoing  -AB        Time Calculation    OT Goal Re-Cert Due Date  07/16/21  -AB       User Key  (r) = Recorded By, (t) = Taken By, (c) = Cosigned By    Initials Name Provider Type    Chely Mckenna, OT Occupational Therapist          Therapy Education  Given: HEP, Edema management, Symptoms/condition management, Bandaging/dressing change  Program: Reinforced  How Provided: Verbal, Demonstration  Provided to: Patient  Level of Understanding: Verbalized                Time Calculation:   OT Start Time: 0930  OT Stop Time: 1130  OT Time Calculation (min): 120 min  OT Non-Billable Time (min): 35 min  Total Timed Code Minutes- OT: 120 minute(s)     Therapy Charges for Today     Code Description Service Date Service Provider Modifiers Qty    43612048428 HC OT MANUAL THERAPY EA 15 MIN 5/18/2021 Chely Sierra OT GO, KX 4    23392105380 HC OT LYMPHEDEMA MANAGEMENT-15 MIN 5/18/2021 Chely iSerra, OT KX 1    35890070999 HC OT VASOPNEUMAT DEVIC 1 OR MORE AREAS 5/18/2021 Chely Sirera, OT GO, KX 1    39875202416 HC OT SELF CARE/MGMT/TRAIN EA 15 MIN 5/18/2021 Chely Sierra, OT GO, KX 2                      Chely Sierra OT  5/18/2021

## 2021-05-20 ENCOUNTER — APPOINTMENT (OUTPATIENT)
Dept: OCCUPATIONAL THERAPY | Facility: HOSPITAL | Age: 56
End: 2021-05-20

## 2021-05-26 ENCOUNTER — TRANSCRIBE ORDERS (OUTPATIENT)
Dept: ADMINISTRATIVE | Facility: HOSPITAL | Age: 56
End: 2021-05-26

## 2021-05-26 DIAGNOSIS — R10.0 ACUTE ABDOMINAL PAIN SYNDROME: Primary | ICD-10-CM

## 2021-05-27 ENCOUNTER — HOSPITAL ENCOUNTER (OUTPATIENT)
Dept: OCCUPATIONAL THERAPY | Facility: HOSPITAL | Age: 56
Setting detail: THERAPIES SERIES
Discharge: HOME | End: 2021-05-27

## 2021-05-27 ENCOUNTER — HOSPITAL ENCOUNTER (OUTPATIENT)
Dept: ULTRASOUND IMAGING | Facility: HOSPITAL | Age: 56
Discharge: HOME | End: 2021-05-27
Admitting: NURSE PRACTITIONER

## 2021-05-27 DIAGNOSIS — E66.9 OBESITY, UNSPECIFIED CLASSIFICATION, UNSPECIFIED OBESITY TYPE, UNSPECIFIED WHETHER SERIOUS COMORBIDITY PRESENT: Primary | ICD-10-CM

## 2021-05-27 DIAGNOSIS — R60.0 BILATERAL LOWER EXTREMITY EDEMA: ICD-10-CM

## 2021-05-27 DIAGNOSIS — R10.0 ACUTE ABDOMINAL PAIN SYNDROME: ICD-10-CM

## 2021-05-27 DIAGNOSIS — I89.0 LYMPHEDEMA: ICD-10-CM

## 2021-05-27 DIAGNOSIS — Z98.890 H/O VASCULAR SURGERY: ICD-10-CM

## 2021-05-27 PROCEDURE — 76700 US EXAM ABDOM COMPLETE: CPT

## 2021-05-27 PROCEDURE — 97016 VASOPNEUMATIC DEVICE THERAPY: CPT

## 2021-05-27 PROCEDURE — 97535 SELF CARE MNGMENT TRAINING: CPT

## 2021-05-27 PROCEDURE — 97140 MANUAL THERAPY 1/> REGIONS: CPT

## 2021-05-27 PROCEDURE — 76700 US EXAM ABDOM COMPLETE: CPT | Performed by: RADIOLOGY

## 2021-05-27 PROCEDURE — 97139 UNLISTED THERAPEUTIC PX: CPT

## 2021-05-27 NOTE — THERAPY TREATMENT NOTE
Outpatient Occupational Therapy Lymphedema Treatment Note   [facility]     Patient Name: El Nielsen  : 1965  MRN: [id number]  Today's Date: 2021      Visit Date: 2021    There is no problem list on file for this patient.       Past Medical History:   Diagnosis Date   • Arthritis    • Elevated cholesterol    • GERD (gastroesophageal reflux disease)    • Hypertension    • Sleep apnea         Past Surgical History:   Procedure Laterality Date   • EYE SURGERY     • MENISCECTOMY Left    • VASCULAR SURGERY           Visit Dx:      ICD-10-CM ICD-9-CM   1. Obesity, unspecified classification, unspecified obesity type, unspecified whether serious comorbidity present  E66.9 278.00   2. H/O vascular surgery  Z98.890 V45.89   3. Lymphedema  I89.0 457.1   4. Bilateral lower extremity edema  R60.0 782.3       Lymphedema     Row Name 21 1000             Subjective Pain    Able to rate subjective pain?  yes  -BC      Pre-Treatment Pain Level  3  -BC      Subjective Pain Comment  BLE's/Shins  -BC         Subjective Comments    Subjective Comments  Pt. presents this date with dry cough and  with c/o pain in his shin.   -BC         Lymphedema Assessment    Lymphedema Classification  RLE:;LLE:;Trunk:;stage 2 (Spontaneously Irreversible);stage 3 (Lymphostatic Elephantiasis);secondary  -BC      Infections or Cellulitis?  no none currently  -BC         Posture/Observations    Posture- WNL  Posture is WNL  -BC      Observations  Edema  -BC         Lymphedema Edema Assessment    Ptting Edema Category  By grade out of 4;By severity  -BC      Pitting Edema  Severe;+ 4/4  -BC      Stemmer Sign  bilateral:;positive  -BC      Edema Assessment Comment  Pitting edema +2-3/4  -BC         Skin Changes/Observations    Location/Assessment  Lower Extremity  -BC      Lower Extremity Conditions  bilateral:;dry;hairless;scaly;crust  -BC      Lower Extremity Color/Pigment   bilateral:;purple;red;blanchable;non-blanchable;hyperpigmented;brawny;woody;fibrosis  -BC      Skin Observations Comment  Discoloration BLE's, dry, scaly.   -BC         Lymphedema Sensation    Lymphedema Sensation Reports  RLE:;LLE:;numbness;tingling  -BC      Lymphedema Sensation Tests  light touch;deep touch  -BC      Lymphedema Light Touch  absent sensation;LLE:;RLE:  -BC      Lymphedema Deep Touch  RLE:;LLE:;severe impairment RLE more impaired  -BC      Lymphedema Sensation Comments  Hyper sensitive B feet.   -BC         Lymphedema Pulses/Capillary Refill    Lymphedema Pulses/Capillary Refill  capillary refill  -BC      Capillary Refill  lower extremity capillary refill  -BC      Lower Extremity Capillary Refill  left:;right:;greater than 3 seconds  -BC         Lymphedema Measurements    Measurement Type(s)  Quick Girth;Circumferential  -BC      Quick Girth Areas  Lower extremities  -BC      Circumferential Areas  Trunk  -BC         LLE Quick Girth (cm)    Met-heads  34.4 cm  -BC      Mid foot  35.7 cm  -BC      Smallest ankle  40.5 cm  -BC      Largest calf  61.5 cm  -BC      Tib tuberosity  54 cm  -BC      Mid patella  63.3 cm  -BC      Distal thigh  69 cm  -BC      Other 1  43.4 cm  -BC      Other 2  50.1 cm  -BC         RLE Quick Girth (cm)    Met-heads  33 cm  -BC      Mid foot  36.5 cm  -BC      Smallest ankle  42 cm  -BC      Largest calf  63.2 cm  -BC      Tib tuberosity  55.9 cm  -BC      Mid patella  63.9 cm  -BC      Distal thigh  69.7 cm  -BC      Other 1  45 cm  -BC      Other 2  52 cm  -BC      RLE Quick Girth Total  461.2  -BC         Trunk Circumferential (cm)    Measurement Location 1  Chest  -BC      Measurement Location 2  Navel  -BC         Compression/Skin Care    Compression/Skin Care  bandaging;skin care;wrapping location  -BC      Skin Care  moisturizing lotion applied  -BC      Wrapping Location  lower extremity  -BC      Wrapping Location LE  bilateral: base of toes to knees  -BC       Bandage Layers  short-stretch bandages (comment size/quantity);cotton elastic stocking- single layer (comment size);soft foam- 1/4 inch  -BC      Bandaging Comments  Added Kombrex size 1 foam pad on R ankle distal aspect.  -BC      Bandaging Technique  circumferential/spiral;moderate compression  -BC      Compression/Skin Care Comments  Compression pump x Abd., BLE's  -BC        User Key  (r) = Recorded By, (t) = Taken By, (c) = Cosigned By    Initials Name Provider Type    Vera Mitchell OT Occupational Therapist                  OT Assessment/Plan     Row Name 05/27/21 1231          OT Assessment    Assessment Comments  Pt. positioned in supported recline for manual lymph drainage, compression pump, skin care and multi layered bandaging of BLE's, toes to below knees.  -BC       User Key  (r) = Recorded By, (t) = Taken By, (c) = Cosigned By    Initials Name Provider Type    Vera Mitchell OT Occupational Therapist                    Therapy Education  Given: Symptoms/condition management  Program: Reinforced  How Provided: Verbal  Provided to: Patient  Level of Understanding: Verbalized                Time Calculation:   OT Start Time: 1000  OT Stop Time: 1140  OT Time Calculation (min): 100 min  OT Non-Billable Time (min): 20 min     Therapy Charges for Today     Code Description Service Date Service Provider Modifiers Qty    07540325287 HC OT MANUAL THERAPY EA 15 MIN 5/27/2021 Vera Bowman OT GO KX 4    82017245323 HC OT LYMPHEDEMA MANAGEMENT-15 MIN 5/27/2021 Vera Bowman OT KX 1    94087834089 HC OT SELF CARE/MGMT/TRAIN EA 15 MIN 5/27/2021 Vera Bowman OT GO KX 1    09253738021 HC OT VASOPNEUMAT DEVIC 1 OR MORE AREAS 5/27/2021 Vera Bowman OT GO, KX 1                      Vera Bowman OT  5/27/2021

## 2021-06-04 ENCOUNTER — APPOINTMENT (OUTPATIENT)
Dept: OCCUPATIONAL THERAPY | Facility: HOSPITAL | Age: 56
End: 2021-06-04

## 2021-06-07 ENCOUNTER — APPOINTMENT (OUTPATIENT)
Dept: OCCUPATIONAL THERAPY | Facility: HOSPITAL | Age: 56
End: 2021-06-07

## 2021-06-08 ENCOUNTER — OFFICE VISIT (OUTPATIENT)
Dept: SURGERY | Facility: CLINIC | Age: 56
End: 2021-06-08

## 2021-06-08 ENCOUNTER — HOSPITAL ENCOUNTER (OUTPATIENT)
Dept: OCCUPATIONAL THERAPY | Facility: HOSPITAL | Age: 56
Setting detail: THERAPIES SERIES
Discharge: HOME | End: 2021-06-08

## 2021-06-08 VITALS
DIASTOLIC BLOOD PRESSURE: 83 MMHG | HEART RATE: 85 BPM | SYSTOLIC BLOOD PRESSURE: 162 MMHG | HEIGHT: 75 IN | BODY MASS INDEX: 39.17 KG/M2 | WEIGHT: 315 LBS

## 2021-06-08 DIAGNOSIS — E66.9 OBESITY, UNSPECIFIED CLASSIFICATION, UNSPECIFIED OBESITY TYPE, UNSPECIFIED WHETHER SERIOUS COMORBIDITY PRESENT: Primary | ICD-10-CM

## 2021-06-08 DIAGNOSIS — I89.0 LYMPHEDEMA: ICD-10-CM

## 2021-06-08 DIAGNOSIS — R60.0 BILATERAL LOWER EXTREMITY EDEMA: ICD-10-CM

## 2021-06-08 DIAGNOSIS — K40.90 NON-RECURRENT UNILATERAL INGUINAL HERNIA WITHOUT OBSTRUCTION OR GANGRENE: Primary | ICD-10-CM

## 2021-06-08 DIAGNOSIS — Z98.890 H/O VASCULAR SURGERY: ICD-10-CM

## 2021-06-08 PROCEDURE — 99203 OFFICE O/P NEW LOW 30 MIN: CPT | Performed by: SURGERY

## 2021-06-08 PROCEDURE — 97016 VASOPNEUMATIC DEVICE THERAPY: CPT

## 2021-06-08 PROCEDURE — 97140 MANUAL THERAPY 1/> REGIONS: CPT

## 2021-06-08 PROCEDURE — 97535 SELF CARE MNGMENT TRAINING: CPT

## 2021-06-08 PROCEDURE — 97139 UNLISTED THERAPEUTIC PX: CPT

## 2021-06-08 RX ORDER — LISINOPRIL AND HYDROCHLOROTHIAZIDE 20; 12.5 MG/1; MG/1
1 TABLET ORAL DAILY
COMMUNITY

## 2021-06-08 RX ORDER — KETOCONAZOLE 20 MG/G
CREAM TOPICAL DAILY
COMMUNITY

## 2021-06-08 RX ORDER — AMLODIPINE BESYLATE 5 MG/1
5 TABLET ORAL DAILY
COMMUNITY

## 2021-06-08 RX ORDER — ATORVASTATIN CALCIUM 20 MG/1
20 TABLET, FILM COATED ORAL DAILY
COMMUNITY

## 2021-06-08 RX ORDER — SPIRONOLACTONE 25 MG/1
25 TABLET ORAL DAILY
COMMUNITY

## 2021-06-08 NOTE — PROGRESS NOTES
Subjective   El Nielsen is a 55 y.o. male is being seen for consultation today at the request of Arlin Wheeler APRN    El Nielsen is a 55 y.o. male With a body mass index 65.4.  He has concurrent venous stasis disease osteoarthritis hypertension and GERD.  The patient is concerned for right inguinal hernia.  He does have reducible abdominal hernia on examination.  He smokes more than 1 pack/day.  He has shortness of breath with ambulation and lower back pain.  No obstructive symptoms reported.  He is being evaluated by bariatric surgeon currently.      Past Medical History:   Diagnosis Date   • Arthritis    • Elevated cholesterol    • GERD (gastroesophageal reflux disease)    • Hypertension    • Sleep apnea        Family History   Problem Relation Age of Onset   • Arthritis Mother    • COPD Mother    • Hypertension Father    • Arthritis Maternal Grandmother        Social History     Socioeconomic History   • Marital status:      Spouse name: Not on file   • Number of children: Not on file   • Years of education: Not on file   • Highest education level: Not on file   Tobacco Use   • Smoking status: Current Every Day Smoker     Packs/day: 0.50     Types: Cigarettes   • Smokeless tobacco: Never Used   Substance and Sexual Activity   • Alcohol use: Defer   • Drug use: No   • Sexual activity: Defer       Past Surgical History:   Procedure Laterality Date   • EYE SURGERY     • MENISCECTOMY Left    • VASCULAR SURGERY         Review of Systems   Constitutional: Negative for activity change, appetite change, chills and fever.   HENT: Negative for sore throat and trouble swallowing.    Eyes: Negative for visual disturbance.   Respiratory: Negative for cough and shortness of breath.    Cardiovascular: Negative for chest pain and palpitations.   Gastrointestinal: Negative for abdominal distention, abdominal pain, blood in stool, constipation, diarrhea, nausea and vomiting.   Endocrine: Negative for  "cold intolerance and heat intolerance.   Genitourinary: Negative for dysuria.   Musculoskeletal: Negative for joint swelling.   Skin: Negative for color change, rash and wound.   Allergic/Immunologic: Negative for immunocompromised state.   Neurological: Negative for dizziness, seizures, weakness and headaches.   Hematological: Negative for adenopathy. Does not bruise/bleed easily.   Psychiatric/Behavioral: Negative for agitation and confusion.         /83   Pulse 85   Ht 190.5 cm (75\")   Wt (!) 237 kg (523 lb)   BMI 65.37 kg/m²   Objective   Physical Exam  Constitutional:       Appearance: He is well-developed.   HENT:      Head: Normocephalic and atraumatic.   Eyes:      Conjunctiva/sclera: Conjunctivae normal.      Pupils: Pupils are equal, round, and reactive to light.   Neck:      Thyroid: No thyromegaly.      Vascular: No JVD.      Trachea: No tracheal deviation.   Cardiovascular:      Rate and Rhythm: Normal rate and regular rhythm.      Heart sounds: No murmur heard.   No friction rub. No gallop.    Pulmonary:      Effort: Pulmonary effort is normal.      Breath sounds: Normal breath sounds.   Abdominal:      General: There is no distension.      Palpations: Abdomen is soft. There is no hepatomegaly or splenomegaly.      Tenderness: There is no abdominal tenderness.      Hernia: No hernia is present.   Musculoskeletal:         General: No deformity. Normal range of motion.      Cervical back: Neck supple.   Skin:     General: Skin is warm and dry.   Neurological:      Mental Status: He is alert and oriented to person, place, and time.               Assessment   There are no diagnoses linked to this encounter.  El Nielsen is a 55 y.o. male with right inguinal hernia.  The patient has morbid obesity and smokes.  He has been counseled on weight loss and he is undergoing weight loss surgery evaluation currently.  He will also attempt smoking cessation.  He is aware symptoms that should prompt " return to care.  He will require body mass index of 40 or less with no smoking prior to elective repair.    Patient's Body mass index is 65.37 kg/m². indicating that he is morbidly obese (BMI > 40 or > 35 with obesity - related health condition). Obesity-related health conditions include the following: obstructive sleep apnea, hypertension, coronary heart disease and lower extremity venous stasis disease. Obesity is unchanged. BMI is is above average; BMI management plan is completed. We discussed portion control, increasing exercise and consulting a Bariatric surgeon..

## 2021-06-08 NOTE — THERAPY TREATMENT NOTE
Outpatient Occupational Therapy Lymphedema Treatment Note  CASSANDRA Alonso     Patient Name: El Nielsen  : 1965  MRN: [id number]  Today's Date: 2021      Visit Date: 2021    Patient Active Problem List   Diagnosis   • Non-recurrent unilateral inguinal hernia without obstruction or gangrene        Past Medical History:   Diagnosis Date   • Arthritis    • Elevated cholesterol    • GERD (gastroesophageal reflux disease)    • Hypertension    • Sleep apnea         Past Surgical History:   Procedure Laterality Date   • EYE SURGERY     • MENISCECTOMY Left    • VASCULAR SURGERY           Visit Dx:      ICD-10-CM ICD-9-CM   1. Obesity, unspecified classification, unspecified obesity type, unspecified whether serious comorbidity present  E66.9 278.00   2. H/O vascular surgery  Z98.890 V45.89   3. Lymphedema  I89.0 457.1   4. Bilateral lower extremity edema  R60.0 782.3       Lymphedema     Row Name 21 1100             Subjective Pain    Able to rate subjective pain?  yes  -AB      Pre-Treatment Pain Level  5  -AB      Subjective Pain Comment  bilateral shins/calf tenderness  -AB         Subjective Comments    Subjective Comments  Pt. presents to tx this date w/ increased tenderness to bilateral shin/calf areas. He reports that he has had difficulty breathing and O2 sats dropping into 80's w/ activity over the last week. He has an appt w/ PCP this afternoon.   -AB         Lymphedema Assessment    Lymphedema Classification  RLE:;LLE:;Trunk:;stage 2 (Spontaneously Irreversible);stage 3 (Lymphostatic Elephantiasis);secondary  -AB      Infections or Cellulitis?  no none currently  -AB         Posture/Observations    Posture- WNL  Posture is WNL  -AB      Observations  Edema  -AB         Lymphedema Edema Assessment    Ptting Edema Category  By grade out of 4;By severity  -AB      Pitting Edema  Severe;+ 3/4  -AB      Stemmer Sign  bilateral:;positive  -AB         Skin Changes/Observations     Location/Assessment  Lower Extremity  -AB      Lower Extremity Conditions  bilateral:;dry;hairless;scaly;crust  -AB      Lower Extremity Color/Pigment  bilateral:;purple;red;blanchable;non-blanchable;hyperpigmented;brawny;woody;fibrosis  -AB         Lymphedema Sensation    Lymphedema Sensation Reports  RLE:;LLE:;numbness;tingling  -AB      Lymphedema Sensation Tests  light touch;deep touch  -AB      Lymphedema Light Touch  absent sensation;LLE:;RLE:  -AB      Lymphedema Deep Touch  RLE:;LLE:;severe impairment RLE more impaired  -AB         Lymphedema Pulses/Capillary Refill    Lymphedema Pulses/Capillary Refill  capillary refill  -AB      Capillary Refill  lower extremity capillary refill  -AB      Lower Extremity Capillary Refill  left:;right:;greater than 3 seconds  -AB         Lymphedema Measurements    Measurement Type(s)  Quick Girth;Circumferential  -AB      Quick Girth Areas  Lower extremities  -AB      Circumferential Areas  Trunk  -AB         LLE Quick Girth (cm)    Met-heads  35.3 cm  -AB      Mid foot  36.8 cm  -AB      Smallest ankle  40.4 cm  -AB      Largest calf  61.1 cm  -AB      Tib tuberosity  55.8 cm  -AB      Mid patella  64.7 cm  -AB      Distal thigh  69.2 cm  -AB      Other 1  42.4 cm  -AB      Other 2  49.8 cm  -AB         RLE Quick Girth (cm)    Met-heads  35.4 cm  -AB      Mid foot  37.6 cm  -AB      Smallest ankle  47 cm  -AB      Largest calf  63.7 cm  -AB      Tib tuberosity  56.4 cm  -AB      Mid patella  63.7 cm  -AB      Distal thigh  68.8 cm  -AB      Other 1  46.5 cm  -AB      Other 2  52.6 cm  -AB      RLE Quick Girth Total  471.7  -AB         Trunk Circumferential (cm)    Measurement Location 1  Chest  -AB      Measurement Location 2  Navel  -AB         Compression/Skin Care    Compression/Skin Care  bandaging;skin care;wrapping location  -AB      Skin Care  moisturizing lotion applied  -AB      Wrapping Location  lower extremity  -AB      Wrapping Location LE  bilateral: toes to  knees w/ feet wrapped separately  -AB      Bandage Layers  short-stretch bandages (comment size/quantity);cotton elastic stocking- single layer (comment size);soft foam- 1/4 inch  -AB      Bandaging Technique  circumferential/spiral;moderate compression  -AB      Compression/Skin Care Comments  compression pump to abdomen and BLE's  -AB        User Key  (r) = Recorded By, (t) = Taken By, (c) = Cosigned By    Initials Name Provider Type    AB Chely Sierra, OT Occupational Therapist                                Therapy Education  Given: HEP, Edema management, Symptoms/condition management, Bandaging/dressing change  Program: Reinforced  How Provided: Verbal, Demonstration  Provided to: Patient  Level of Understanding: Verbalized                Time Calculation:   OT Start Time: 1005  OT Stop Time: 1150  OT Time Calculation (min): 105 min  OT Non-Billable Time (min): 25 min  Total Timed Code Minutes- OT: 105 minute(s)     Therapy Charges for Today     Code Description Service Date Service Provider Modifiers Qty    13547268126 HC OT MANUAL THERAPY EA 15 MIN 6/8/2021 Chely Sierra OT JEREMY, EFFIEX 4    26140236265 HC OT LYMPHEDEMA MANAGEMENT-15 MIN 6/8/2021 Chely Sierra OT KX 1    42156779441 HC OT VASOPNEUMAT DEVIC 1 OR MORE AREAS 6/8/2021 Chely Sierra OT GO, KX 1    06456464702 HC OT SELF CARE/MGMT/TRAIN EA 15 MIN 6/8/2021 Chely Sierra OT JEREMY, KX 1                      Chely Sierra OT  6/8/2021

## 2021-06-17 ENCOUNTER — APPOINTMENT (OUTPATIENT)
Dept: OCCUPATIONAL THERAPY | Facility: HOSPITAL | Age: 56
End: 2021-06-17

## 2021-06-22 ENCOUNTER — APPOINTMENT (OUTPATIENT)
Dept: OCCUPATIONAL THERAPY | Facility: HOSPITAL | Age: 56
End: 2021-06-22

## 2021-06-22 ENCOUNTER — HOSPITAL ENCOUNTER (OUTPATIENT)
Dept: OCCUPATIONAL THERAPY | Facility: HOSPITAL | Age: 56
Setting detail: THERAPIES SERIES
Discharge: HOME | End: 2021-06-22

## 2021-06-22 DIAGNOSIS — I89.0 LYMPHEDEMA: Primary | ICD-10-CM

## 2021-06-22 DIAGNOSIS — E66.9 OBESITY, UNSPECIFIED CLASSIFICATION, UNSPECIFIED OBESITY TYPE, UNSPECIFIED WHETHER SERIOUS COMORBIDITY PRESENT: ICD-10-CM

## 2021-06-22 DIAGNOSIS — Z98.890 H/O VASCULAR SURGERY: ICD-10-CM

## 2021-06-22 DIAGNOSIS — R60.0 BILATERAL LOWER EXTREMITY EDEMA: ICD-10-CM

## 2021-06-22 PROCEDURE — 97140 MANUAL THERAPY 1/> REGIONS: CPT

## 2021-06-22 PROCEDURE — 97016 VASOPNEUMATIC DEVICE THERAPY: CPT

## 2021-06-22 PROCEDURE — 97139 UNLISTED THERAPEUTIC PX: CPT

## 2021-06-22 PROCEDURE — 97535 SELF CARE MNGMENT TRAINING: CPT

## 2021-06-22 NOTE — THERAPY TREATMENT NOTE
"Outpatient Occupational Therapy Lymphedema Treatment Note  CASSANDRA Alonso     Patient Name: El Nielsen  : 1965  MRN: [id number]  Today's Date: 2021      Visit Date: 2021    Patient Active Problem List   Diagnosis   • Non-recurrent unilateral inguinal hernia without obstruction or gangrene        Past Medical History:   Diagnosis Date   • Arthritis    • Elevated cholesterol    • GERD (gastroesophageal reflux disease)    • Hypertension    • Sleep apnea         Past Surgical History:   Procedure Laterality Date   • EYE SURGERY     • MENISCECTOMY Left    • VASCULAR SURGERY           Visit Dx:      ICD-10-CM ICD-9-CM   1. Lymphedema  I89.0 457.1   2. Obesity, unspecified classification, unspecified obesity type, unspecified whether serious comorbidity present  E66.9 278.00   3. H/O vascular surgery  Z98.890 V45.89   4. Bilateral lower extremity edema  R60.0 782.3       Lymphedema     Row Name 21 1400             Subjective Pain    Able to rate subjective pain?  yes  -AB      Pre-Treatment Pain Level  4  -AB      Subjective Pain Comment  BLE's  -AB         Subjective Comments    Subjective Comments  Pt. reports intermittent \"jabbing pains\" in BLE's  -AB         Lymphedema Assessment    Lymphedema Classification  RLE:;LLE:;Trunk:;stage 2 (Spontaneously Irreversible);stage 3 (Lymphostatic Elephantiasis);secondary  -AB      Infections or Cellulitis?  no none currently  -AB         Posture/Observations    Posture- WNL  Posture is WNL  -AB      Observations  Edema  -AB         Lymphedema Edema Assessment    Ptting Edema Category  By grade out of 4;By severity  -AB      Pitting Edema  Severe;+ 3/4  -AB      Stemmer Sign  bilateral:;positive  -AB         Skin Changes/Observations    Location/Assessment  Lower Extremity  -AB      Lower Extremity Conditions  bilateral:;dry;hairless;scaly;crust  -AB      Lower Extremity Color/Pigment  " bilateral:;purple;red;blanchable;non-blanchable;hyperpigmented;brawny;woody;fibrosis  -AB         Lymphedema Sensation    Lymphedema Sensation Reports  RLE:;LLE:;numbness;tingling  -AB      Lymphedema Sensation Tests  light touch;deep touch  -AB      Lymphedema Light Touch  absent sensation;LLE:;RLE:  -AB      Lymphedema Deep Touch  RLE:;LLE:;severe impairment RLE more impaired  -AB         Lymphedema Pulses/Capillary Refill    Lymphedema Pulses/Capillary Refill  capillary refill  -AB      Capillary Refill  lower extremity capillary refill  -AB      Lower Extremity Capillary Refill  left:;right:;greater than 3 seconds  -AB         Lymphedema Measurements    Measurement Type(s)  Quick Girth;Circumferential  -AB      Quick Girth Areas  Lower extremities  -AB      Circumferential Areas  Trunk  -AB         LLE Quick Girth (cm)    Met-heads  34 cm  -AB      Mid foot  34.2 cm  -AB      Smallest ankle  39.7 cm  -AB      Largest calf  57.8 cm  -AB      Tib tuberosity  53.3 cm  -AB      Mid patella  63.9 cm  -AB      Other 1  40 cm  -AB      Other 2  49.1 cm  -AB         RLE Quick Girth (cm)    Met-heads  34.8 cm  -AB      Mid foot  37.9 cm  -AB      Smallest ankle  44.7 cm  -AB      Largest calf  61.7 cm  -AB      Tib tuberosity  55.9 cm  -AB      Mid patella  66.1 cm  -AB      Distal thigh  69.1 cm  -AB      Other 1  45.5 cm  -AB      Other 2  52 cm  -AB      RLE Quick Girth Total  467.7  -AB         Trunk Circumferential (cm)    Measurement Location 1  Chest  -AB      Trunk 1  170 cm  -AB      Measurement Location 2  Navel  -AB      Trunk 2  178 cm  -AB      Trunk Circumferential Total  348 cm  -AB         Compression/Skin Care    Compression/Skin Care  bandaging;skin care;wrapping location  -AB      Skin Care  moisturizing lotion applied  -AB      Wrapping Location  lower extremity  -AB      Wrapping Location LE  bilateral: toes to knees  -AB      Bandage Layers  short-stretch bandages (comment size/quantity);cotton  elastic stocking- single layer (comment size);soft foam- 1/4 inch  -AB      Bandaging Technique  circumferential/spiral;moderate compression  -AB      Compression/Skin Care Comments  compression pump to abdomen and BLE's  -AB        User Key  (r) = Recorded By, (t) = Taken By, (c) = Cosigned By    Initials Name Provider Type    Chely Mckenna, OT Occupational Therapist                                Therapy Education  Given: HEP, Edema management, Symptoms/condition management, Bandaging/dressing change  Program: Reinforced  How Provided: Verbal, Demonstration  Provided to: Patient  Level of Understanding: Verbalized                Time Calculation:   OT Start Time: 1300  OT Stop Time: 1455  OT Time Calculation (min): 115 min  OT Non-Billable Time (min): 25 min  Total Timed Code Minutes- OT: 115 minute(s)     Therapy Charges for Today     Code Description Service Date Service Provider Modifiers Qty    66122291400  OT MANUAL THERAPY EA 15 MIN 6/22/2021 Chely Sierra OT EFFIE LUNAX 4    [id number]  OT LYMPHEDEMA MANAGEMENT-15 MIN 6/22/2021 Chely Sierra OT KX 1    [id number]  OT VASOPNEUMAT DEVIC 1 OR MORE AREAS 6/22/2021 Chely Sierra OT JEREMY, KX 1    [id number]  OT SELF CARE/MGMT/TRAIN EA 15 MIN 6/22/2021 Chely Sierra OT GO, KX 2                      Chely Sierra OT  6/22/2021

## 2021-06-24 ENCOUNTER — HOSPITAL ENCOUNTER (OUTPATIENT)
Dept: OCCUPATIONAL THERAPY | Facility: HOSPITAL | Age: 56
Setting detail: THERAPIES SERIES
Discharge: HOME | End: 2021-06-24

## 2021-06-24 ENCOUNTER — APPOINTMENT (OUTPATIENT)
Dept: OCCUPATIONAL THERAPY | Facility: HOSPITAL | Age: 56
End: 2021-06-24

## 2021-06-24 DIAGNOSIS — I89.0 LYMPHEDEMA: Primary | ICD-10-CM

## 2021-06-24 DIAGNOSIS — E66.9 OBESITY, UNSPECIFIED CLASSIFICATION, UNSPECIFIED OBESITY TYPE, UNSPECIFIED WHETHER SERIOUS COMORBIDITY PRESENT: ICD-10-CM

## 2021-06-24 DIAGNOSIS — Z98.890 H/O VASCULAR SURGERY: ICD-10-CM

## 2021-06-24 DIAGNOSIS — R60.0 BILATERAL LOWER EXTREMITY EDEMA: ICD-10-CM

## 2021-06-24 PROCEDURE — 97535 SELF CARE MNGMENT TRAINING: CPT

## 2021-06-24 PROCEDURE — 97140 MANUAL THERAPY 1/> REGIONS: CPT

## 2021-06-24 PROCEDURE — 97016 VASOPNEUMATIC DEVICE THERAPY: CPT

## 2021-06-24 PROCEDURE — 97139 UNLISTED THERAPEUTIC PX: CPT

## 2021-06-24 NOTE — THERAPY PROGRESS REPORT/RE-CERT
Outpatient Occupational Therapy Lymphedema Progress Note   [facility]     Patient Name: El Nielsen  : 1965  MRN: [id number]  Today's Date: 2021      Visit Date: 2021    Patient Active Problem List   Diagnosis   • Non-recurrent unilateral inguinal hernia without obstruction or gangrene        Past Medical History:   Diagnosis Date   • Arthritis    • Elevated cholesterol    • GERD (gastroesophageal reflux disease)    • Hypertension    • Sleep apnea         Past Surgical History:   Procedure Laterality Date   • EYE SURGERY     • MENISCECTOMY Left    • VASCULAR SURGERY           Visit Dx:      ICD-10-CM ICD-9-CM   1. Lymphedema  I89.0 457.1   2. Obesity, unspecified classification, unspecified obesity type, unspecified whether serious comorbidity present  E66.9 278.00   3. H/O vascular surgery  Z98.890 V45.89   4. Bilateral lower extremity edema  R60.0 782.3       Lymphedema     Row Name 21 1000             Subjective Pain    Able to rate subjective pain?  yes  -AB      Pre-Treatment Pain Level  4  -AB      Subjective Pain Comment  BLE's  -AB         Subjective Comments    Subjective Comments  No new c/o voiced this date.   -AB         Lymphedema Assessment    Lymphedema Classification  RLE:;LLE:;Trunk:;stage 2 (Spontaneously Irreversible);stage 3 (Lymphostatic Elephantiasis);secondary  -AB      Infections or Cellulitis?  no none currently  -AB         Posture/Observations    Posture- WNL  Posture is WNL  -AB      Observations  Edema  -AB         Lymphedema Edema Assessment    Ptting Edema Category  By grade out of 4;By severity  -AB      Pitting Edema  Severe;+ 3/4  -AB      Stemmer Sign  bilateral:;positive  -AB         Skin Changes/Observations    Location/Assessment  Lower Extremity  -AB      Lower Extremity Conditions  bilateral:;dry;hairless;scaly;crust  -AB      Lower Extremity Color/Pigment  bilateral:;purple;red;blanchable;non-blanchable;hyperpigmented;brawny;woody;fibrosis  -AB       Skin Observations Comment  BLE's appear darker this date  -AB         Lymphedema Sensation    Lymphedema Sensation Reports  RLE:;LLE:;numbness;tingling  -AB      Lymphedema Sensation Tests  light touch;deep touch  -AB      Lymphedema Light Touch  absent sensation;LLE:;RLE:  -AB      Lymphedema Deep Touch  RLE:;LLE:;severe impairment RLE more impaired  -AB         Lymphedema Pulses/Capillary Refill    Lymphedema Pulses/Capillary Refill  capillary refill  -AB      Capillary Refill  lower extremity capillary refill  -AB      Lower Extremity Capillary Refill  left:;right:;greater than 3 seconds  -AB         Lymphedema Measurements    Measurement Type(s)  Quick Girth;Circumferential  -AB      Quick Girth Areas  Lower extremities  -AB      Circumferential Areas  Trunk  -AB         LLE Quick Girth (cm)    Met-heads  33.4 cm  -AB      Mid foot  34.8 cm  -AB      Smallest ankle  41.8 cm  -AB      Largest calf  56.5 cm  -AB      Tib tuberosity  51.7 cm  -AB      Mid patella  62 cm  -AB      Other 1  39.8 cm  -AB      Other 2  49.3 cm  -AB         RLE Quick Girth (cm)    Met-heads  35.9 cm  -AB      Mid foot  32.7 cm  -AB      Smallest ankle  42.6 cm  -AB      Largest calf  61.8 cm  -AB      Tib tuberosity  54.4 cm  -AB      Mid patella  62.7 cm  -AB      Distal thigh  68.4 cm  -AB      Other 1  44.9 cm  -AB      Other 2  50.5 cm  -AB      RLE Quick Girth Total  453.9  -AB         Trunk Circumferential (cm)    Measurement Location 1  Chest  -AB      Measurement Location 2  Navel  -AB         Compression/Skin Care    Compression/Skin Care  bandaging;skin care;wrapping location  -AB      Skin Care  moisturizing lotion applied  -AB      Wrapping Location  lower extremity  -AB      Wrapping Location LE  bilateral: toes to knees  -AB      Bandage Layers  short-stretch bandages (comment size/quantity);cotton elastic stocking- single layer (comment size);soft foam- 1/4 inch  -AB      Bandaging Technique   circumferential/spiral;moderate compression  -AB      Compression/Skin Care Comments  compression pump to abdomen and BLE's  -AB        User Key  (r) = Recorded By, (t) = Taken By, (c) = Cosigned By    Initials Name Provider Type    Chely Mckenna, OT Occupational Therapist                  OT Assessment/Plan     Row Name 06/24/21 1542          OT Assessment    Functional Limitations  Decreased safety during functional activities;Limitations in functional capacity and performance  -AB     OT Rehab Potential  Good  -AB     Patient/caregiver participated in establishment of treatment plan and goals  Yes  -AB     Patient would benefit from skilled therapy intervention  Yes  -AB        OT Plan    OT Frequency  2x/week  -AB     Predicted Duration of Therapy Intervention (OT)  30 days  -AB     Planned Therapy Interventions (Optional Details)  home exercise program;patient/family education;manual therapy techniques  -AB     OT Plan Comments  Pt. continues to make progress towards goals. He will benefit from continued skilled OT services. Continue POC.  -AB       User Key  (r) = Recorded By, (t) = Taken By, (c) = Cosigned By    Initials Name Provider Type    Chely Mckenna, OT Occupational Therapist                OT Goals     Row Name 06/24/21 1500          OT Short Term Goals    STG Date to Achieve  07/16/21  -AB     STG 1  Pt. will decrease pitting edema to 3to4/4 to decrease risk of infection.   -AB     STG 1 Progress  Partially Met;Ongoing  -AB     STG 2  Pt. will reduce overall girth by 5cm to decrease risk of infection.   -AB     STG 2 Progress  Met  -AB     STG 3  Pt. will be more compliant w/ HEP to assist w/ lymphatic flow.   -AB     STG 3 Progress  Partially Met;Ongoing  -AB        Long Term Goals    LTG Date to Achieve  07/16/21  -AB     LTG 1  Pt. will decrease pitting edema to 3/4 to decrease risk of infection.   -AB     LTG 1 Progress  Ongoing  -AB     LTG 2  Pt. will reduce overall  girth by 10cm to decrease risk of infection.   -AB     LTG 2 Progress  Partially Met;Ongoing  -AB     LTG 3  Pt. will be independent w/ donning of reduction kit.   -AB     LTG 3 Progress  Ongoing  -AB        Time Calculation    OT Goal Re-Cert Due Date  07/16/21  -AB       User Key  (r) = Recorded By, (t) = Taken By, (c) = Cosigned By    Initials Name Provider Type    AB Chely Sierra, OT Occupational Therapist          Therapy Education  Given: HEP, Edema management, Symptoms/condition management, Bandaging/dressing change  Program: Reinforced  How Provided: Verbal, Demonstration  Provided to: Patient  Level of Understanding: Verbalized                Time Calculation:   OT Start Time: 0935  OT Stop Time: 1125  OT Time Calculation (min): 110 min  OT Non-Billable Time (min): 25 min  Total Timed Code Minutes- OT: 110 minute(s)     Therapy Charges for Today     Code Description Service Date Service Provider Modifiers Qty    85104845937 HC OT MANUAL THERAPY EA 15 MIN 6/24/2021 Chely Sierra OT GO, KX 4    90901467297 HC OT LYMPHEDEMA MANAGEMENT-15 MIN 6/24/2021 Chely Sierra OT KX 1    78227572860 HC OT VASOPNEUMAT DEVIC 1 OR MORE AREAS 6/24/2021 Chely Sierra OT GO, KX 1    56773908328 HC OT SELF CARE/MGMT/TRAIN EA 15 MIN 6/24/2021 Chely Sierra OT GO, KX 1                      Chely Sierra OT  6/24/2021

## 2021-06-29 ENCOUNTER — APPOINTMENT (OUTPATIENT)
Dept: OCCUPATIONAL THERAPY | Facility: HOSPITAL | Age: 56
End: 2021-06-29

## 2021-06-29 ENCOUNTER — HOSPITAL ENCOUNTER (OUTPATIENT)
Dept: OCCUPATIONAL THERAPY | Facility: HOSPITAL | Age: 56
Setting detail: THERAPIES SERIES
Discharge: HOME | End: 2021-06-29

## 2021-06-29 DIAGNOSIS — E66.9 OBESITY, UNSPECIFIED CLASSIFICATION, UNSPECIFIED OBESITY TYPE, UNSPECIFIED WHETHER SERIOUS COMORBIDITY PRESENT: ICD-10-CM

## 2021-06-29 DIAGNOSIS — K40.90 NON-RECURRENT UNILATERAL INGUINAL HERNIA WITHOUT OBSTRUCTION OR GANGRENE: ICD-10-CM

## 2021-06-29 DIAGNOSIS — R60.0 BILATERAL LOWER EXTREMITY EDEMA: ICD-10-CM

## 2021-06-29 DIAGNOSIS — Z98.890 H/O VASCULAR SURGERY: ICD-10-CM

## 2021-06-29 DIAGNOSIS — I89.0 LYMPHEDEMA: Primary | ICD-10-CM

## 2021-06-29 PROCEDURE — 97139 UNLISTED THERAPEUTIC PX: CPT

## 2021-06-29 PROCEDURE — 97535 SELF CARE MNGMENT TRAINING: CPT

## 2021-06-29 PROCEDURE — 97140 MANUAL THERAPY 1/> REGIONS: CPT

## 2021-06-29 PROCEDURE — 97016 VASOPNEUMATIC DEVICE THERAPY: CPT

## 2021-06-29 NOTE — THERAPY TREATMENT NOTE
Outpatient Occupational Therapy Lymphedema Treatment Note   Gavin     Patient Name: El Nielsen  : 1965  MRN: [id number]  Today's Date: 2021      Visit Date: 2021    Patient Active Problem List   Diagnosis   • Non-recurrent unilateral inguinal hernia without obstruction or gangrene        Past Medical History:   Diagnosis Date   • Arthritis    • Elevated cholesterol    • GERD (gastroesophageal reflux disease)    • Hypertension    • Sleep apnea         Past Surgical History:   Procedure Laterality Date   • EYE SURGERY     • MENISCECTOMY Left    • VASCULAR SURGERY           Visit Dx:      ICD-10-CM ICD-9-CM   1. Lymphedema  I89.0 457.1   2. Obesity, unspecified classification, unspecified obesity type, unspecified whether serious comorbidity present  E66.9 278.00   3. H/O vascular surgery  Z98.890 V45.89   4. Bilateral lower extremity edema  R60.0 782.3   5. Non-recurrent unilateral inguinal hernia without obstruction or gangrene  K40.90 550.90       Lymphedema     Row Name 21 1000             Subjective Pain    Able to rate subjective pain?  yes  -BC      Pre-Treatment Pain Level  4  -BC      Subjective Pain Comment  BLE's  -BC         Subjective Comments    Subjective Comments  Pt. w/no new complaints voiced.   -BC         Lymphedema Assessment    Lymphedema Classification  RLE:;LLE:;Trunk:;stage 2 (Spontaneously Irreversible);stage 3 (Lymphostatic Elephantiasis);secondary  -BC      Infections or Cellulitis?  no none currently  -BC         Posture/Observations    Posture- WNL  Posture is WNL  -BC      Observations  Edema  -BC         Lymphedema Edema Assessment    Ptting Edema Category  By grade out of 4;By severity  -BC      Pitting Edema  Severe;+ 3/4  -BC      Stemmer Sign  bilateral:;positive  -BC         Skin Changes/Observations    Location/Assessment  Lower Extremity  -BC      Lower Extremity Conditions  bilateral:;dry;hairless;scaly;crust  -BC      Lower Extremity  "Color/Pigment  bilateral:;purple;red;blanchable;non-blanchable;hyperpigmented;brawny;woody;fibrosis  -BC      Skin Observations Comment  Less scaliness noted B\"ly.  -BC         Lymphedema Sensation    Lymphedema Sensation Reports  RLE:;LLE:;numbness;tingling  -BC      Lymphedema Sensation Tests  light touch;deep touch  -BC      Lymphedema Light Touch  absent sensation;LLE:;RLE:  -BC      Lymphedema Deep Touch  RLE:;LLE:;severe impairment RLE more impaired  -BC         Lymphedema Pulses/Capillary Refill    Lymphedema Pulses/Capillary Refill  capillary refill  -BC      Capillary Refill  lower extremity capillary refill  -BC      Lower Extremity Capillary Refill  left:;right:;greater than 3 seconds  -BC         Lymphedema Measurements    Measurement Type(s)  Quick Girth;Circumferential  -BC      Quick Girth Areas  Lower extremities  -BC      Circumferential Areas  Trunk  -BC         LLE Quick Girth (cm)    Met-heads  33.5 cm  -BC      Mid foot  34 cm  -BC      Smallest ankle  38.5 cm  -BC      Largest calf  57.4 cm  -BC      Tib tuberosity  51 cm  -BC      Mid patella  61.3 cm  -BC      Distal thigh  70.4 cm  -BC      Other 1  41 cm  -BC      Other 2  48.8 cm  -BC         RLE Quick Girth (cm)    Met-heads  32 cm  -BC      Mid foot  36.7 cm  -BC      Smallest ankle  41 cm  -BC      Largest calf  62 cm  -BC      Tib tuberosity  55 cm  -BC      Mid patella  59.5 cm  -BC      Distal thigh  69.5 cm  -BC      Other 1  44 cm  -BC      Other 2  49.5 cm  -BC      RLE Quick Girth Total  449.2  -BC         Trunk Circumferential (cm)    Measurement Location 1  Chest  -BC      Measurement Location 2  Navel  -BC         Compression/Skin Care    Compression/Skin Care  bandaging;skin care;wrapping location  -BC      Skin Care  moisturizing lotion applied  -BC      Wrapping Location  lower extremity  -BC      Wrapping Location LE  bilateral: toes to knees  -BC      Bandage Layers  short-stretch bandages (comment size/quantity);cotton " elastic stocking- single layer (comment size);soft foam- 1/4 inch  -BC      Bandaging Technique  circumferential/spiral;moderate compression  -BC      Compression/Skin Care Comments  Compression pump x Abd., BLE's.   -BC        User Key  (r) = Recorded By, (t) = Taken By, (c) = Cosigned By    Initials Name Provider Type    Vera Mitchell OT Occupational Therapist                  OT Assessment/Plan     Row Name 06/29/21 1509          OT Assessment    Assessment Comments  Pt. positioned in suppported recline for manual lymph drainage, compression pump, skin care, and multi layered bandaging of BLE's, toes to below knees.  -BC       User Key  (r) = Recorded By, (t) = Taken By, (c) = Cosigned By    Initials Name Provider Type    Vera Mitchell OT Occupational Therapist                    Therapy Education  Given: Symptoms/condition management  Program: Reinforced  How Provided: Verbal  Provided to: Patient  Level of Understanding: Verbalized                Time Calculation:   OT Start Time: 1000  OT Stop Time: 1145  OT Time Calculation (min): 105 min     Therapy Charges for Today     Code Description Service Date Service Provider Modifiers Qty    05668366045 HC OT LYMPHEDEMA MANAGEMENT-15 MIN 6/29/2021 Vera Bowman OT KX 1    35905614141 HC OT MANUAL THERAPY EA 15 MIN 6/29/2021 Vera Bowman OT GO, KX 3    32002643004 HC OT SELF CARE/MGMT/TRAIN EA 15 MIN 6/29/2021 Vera Bowman OT GO, KX 2    91357097665 HC OT VASOPNEUMAT DEVIC 1 OR MORE AREAS 6/29/2021 Vera Bowman OT GO, KX 1                      Vera Bowman OT  6/29/2021

## 2021-07-01 ENCOUNTER — HOSPITAL ENCOUNTER (OUTPATIENT)
Dept: OCCUPATIONAL THERAPY | Facility: HOSPITAL | Age: 56
Setting detail: THERAPIES SERIES
Discharge: HOME | End: 2021-07-01

## 2021-07-01 ENCOUNTER — APPOINTMENT (OUTPATIENT)
Dept: OCCUPATIONAL THERAPY | Facility: HOSPITAL | Age: 56
End: 2021-07-01

## 2021-07-01 DIAGNOSIS — R60.0 BILATERAL LOWER EXTREMITY EDEMA: ICD-10-CM

## 2021-07-01 DIAGNOSIS — Z98.890 H/O VASCULAR SURGERY: ICD-10-CM

## 2021-07-01 DIAGNOSIS — I89.0 LYMPHEDEMA: Primary | ICD-10-CM

## 2021-07-01 DIAGNOSIS — E66.9 OBESITY, UNSPECIFIED CLASSIFICATION, UNSPECIFIED OBESITY TYPE, UNSPECIFIED WHETHER SERIOUS COMORBIDITY PRESENT: ICD-10-CM

## 2021-07-01 PROCEDURE — 97535 SELF CARE MNGMENT TRAINING: CPT

## 2021-07-01 PROCEDURE — 97139 UNLISTED THERAPEUTIC PX: CPT

## 2021-07-01 PROCEDURE — 97016 VASOPNEUMATIC DEVICE THERAPY: CPT

## 2021-07-01 PROCEDURE — 97140 MANUAL THERAPY 1/> REGIONS: CPT

## 2021-07-01 NOTE — THERAPY TREATMENT NOTE
Outpatient Occupational Therapy Lymphedema Treatment Note  CASSANDRA Alonso     Patient Name: El Nielsen  : 1965  MRN: [id number]  Today's Date: 2021      Visit Date: 2021    Patient Active Problem List   Diagnosis   • Non-recurrent unilateral inguinal hernia without obstruction or gangrene        Past Medical History:   Diagnosis Date   • Arthritis    • Elevated cholesterol    • GERD (gastroesophageal reflux disease)    • Hypertension    • Sleep apnea         Past Surgical History:   Procedure Laterality Date   • EYE SURGERY     • MENISCECTOMY Left    • VASCULAR SURGERY           Visit Dx:      ICD-10-CM ICD-9-CM   1. Lymphedema  I89.0 457.1   2. Obesity, unspecified classification, unspecified obesity type, unspecified whether serious comorbidity present  E66.9 278.00   3. H/O vascular surgery  Z98.890 V45.89   4. Bilateral lower extremity edema  R60.0 782.3       Lymphedema     Row Name 21 1000             Subjective Pain    Able to rate subjective pain?  yes  -AB      Pre-Treatment Pain Level  4  -AB      Subjective Pain Comment  BLE's  -AB         Subjective Comments    Subjective Comments  Pt. presents w/ no new c/o this date.   -AB         Lymphedema Assessment    Lymphedema Classification  RLE:;LLE:;Trunk:;stage 2 (Spontaneously Irreversible);stage 3 (Lymphostatic Elephantiasis);secondary  -AB      Infections or Cellulitis?  no none currently  -AB         Posture/Observations    Posture- WNL  Posture is WNL  -AB      Observations  Edema  -AB         Lymphedema Edema Assessment    Ptting Edema Category  By grade out of 4;By severity  -AB      Pitting Edema  Severe;+ 3/4  -AB      Stemmer Sign  bilateral:;positive  -AB         Skin Changes/Observations    Location/Assessment  Lower Extremity  -AB      Lower Extremity Conditions  bilateral:;dry;hairless;scaly;crust  -AB      Lower Extremity Color/Pigment   bilateral:;purple;red;blanchable;non-blanchable;hyperpigmented;brawny;woody;fibrosis  -AB         Lymphedema Sensation    Lymphedema Sensation Reports  RLE:;LLE:;numbness;tingling  -AB      Lymphedema Sensation Tests  light touch;deep touch  -AB      Lymphedema Light Touch  absent sensation;LLE:;RLE:  -AB      Lymphedema Deep Touch  RLE:;LLE:;severe impairment RLE more impaired  -AB         Lymphedema Pulses/Capillary Refill    Lymphedema Pulses/Capillary Refill  capillary refill  -AB      Capillary Refill  lower extremity capillary refill  -AB      Lower Extremity Capillary Refill  left:;right:;greater than 3 seconds  -AB         Lymphedema Measurements    Measurement Type(s)  Quick Girth;Circumferential  -AB      Quick Girth Areas  Lower extremities  -AB      Circumferential Areas  Trunk  -AB         LLE Quick Girth (cm)    Met-heads  33.2 cm  -AB      Mid foot  33.3 cm  -AB      Smallest ankle  38.7 cm  -AB      Largest calf  55.1 cm  -AB      Tib tuberosity  50.3 cm  -AB      Mid patella  62 cm  -AB      Distal thigh  69.8 cm  -AB      Other 1  39.9 cm  -AB      Other 2  47.7 cm  -AB         RLE Quick Girth (cm)    Met-heads  35 cm  -AB      Mid foot  36.3 cm  -AB      Smallest ankle  41.7 cm  -AB      Largest calf  58.2 cm  -AB      Tib tuberosity  52.5 cm  -AB      Mid patella  63.2 cm  -AB      Distal thigh  69.9 cm  -AB      Other 1  43.8 cm  -AB      Other 2  50.8 cm  -AB      RLE Quick Girth Total  451.4  -AB         Trunk Circumferential (cm)    Measurement Location 1  Chest  -AB      Measurement Location 2  Navel  -AB         Compression/Skin Care    Compression/Skin Care  bandaging;skin care;wrapping location  -AB      Skin Care  moisturizing lotion applied  -AB      Wrapping Location  lower extremity  -AB      Wrapping Location LE  bilateral: base of toes to knees; pt. requests no toe wrappings today  -AB      Bandage Layers  short-stretch bandages (comment size/quantity);cotton elastic stocking-  single layer (comment size);soft foam- 1/4 inch  -AB      Bandaging Technique  circumferential/spiral;moderate compression  -AB      Compression/Skin Care Comments  compression pump to abdomen and BLE's  -AB        User Key  (r) = Recorded By, (t) = Taken By, (c) = Cosigned By    Initials Name Provider Type    Chely Mckenna OT Occupational Therapist                                Therapy Education  Given: HEP, Edema management, Symptoms/condition management, Bandaging/dressing change  Program: Reinforced  How Provided: Verbal, Demonstration  Provided to: Patient  Level of Understanding: Verbalized                Time Calculation:   OT Start Time: 0950  OT Stop Time: 1135  OT Time Calculation (min): 105 min  OT Non-Billable Time (min): 25 min  Total Timed Code Minutes- OT: 105 minute(s)     Therapy Charges for Today     Code Description Service Date Service Provider Modifiers Qty    53704856941  OT MANUAL THERAPY EA 15 MIN 7/1/2021 Chely Sierra OT JEREMY, KX 4    70442403011 HC OT LYMPHEDEMA MANAGEMENT-15 MIN 7/1/2021 Chely Sierra OT KX 1    13307184959  OT VASOPNEUMAT DEVIC 1 OR MORE AREAS 7/1/2021 Chely Sierra OT JEREMY, KX 1    87728552677 HC OT SELF CARE/MGMT/TRAIN EA 15 MIN 7/1/2021 Chely Sierra OT JEREMY, KX 1                      Chely Sierra OT  7/1/2021

## 2021-07-06 ENCOUNTER — APPOINTMENT (OUTPATIENT)
Dept: OCCUPATIONAL THERAPY | Facility: HOSPITAL | Age: 56
End: 2021-07-06

## 2021-07-08 ENCOUNTER — HOSPITAL ENCOUNTER (OUTPATIENT)
Dept: OCCUPATIONAL THERAPY | Facility: HOSPITAL | Age: 56
Setting detail: THERAPIES SERIES
Discharge: HOME | End: 2021-07-08

## 2021-07-08 DIAGNOSIS — K40.90 NON-RECURRENT UNILATERAL INGUINAL HERNIA WITHOUT OBSTRUCTION OR GANGRENE: ICD-10-CM

## 2021-07-08 DIAGNOSIS — I89.0 LYMPHEDEMA: Primary | ICD-10-CM

## 2021-07-08 DIAGNOSIS — Z98.890 H/O VASCULAR SURGERY: ICD-10-CM

## 2021-07-08 DIAGNOSIS — R60.0 BILATERAL LOWER EXTREMITY EDEMA: ICD-10-CM

## 2021-07-08 DIAGNOSIS — E66.9 OBESITY, UNSPECIFIED CLASSIFICATION, UNSPECIFIED OBESITY TYPE, UNSPECIFIED WHETHER SERIOUS COMORBIDITY PRESENT: ICD-10-CM

## 2021-07-08 PROCEDURE — 97016 VASOPNEUMATIC DEVICE THERAPY: CPT

## 2021-07-08 PROCEDURE — 97140 MANUAL THERAPY 1/> REGIONS: CPT

## 2021-07-08 PROCEDURE — 97139 UNLISTED THERAPEUTIC PX: CPT

## 2021-07-08 PROCEDURE — 97535 SELF CARE MNGMENT TRAINING: CPT

## 2021-07-08 NOTE — THERAPY TREATMENT NOTE
Outpatient Occupational Therapy Lymphedema Treatment Note  CASSANDRA Alonso     Patient Name: El Nielsen  : 1965  MRN: [id number]  Today's Date: 2021      Visit Date: 2021    Patient Active Problem List   Diagnosis   • Non-recurrent unilateral inguinal hernia without obstruction or gangrene        Past Medical History:   Diagnosis Date   • Arthritis    • Elevated cholesterol    • GERD (gastroesophageal reflux disease)    • Hypertension    • Sleep apnea         Past Surgical History:   Procedure Laterality Date   • EYE SURGERY     • MENISCECTOMY Left    • VASCULAR SURGERY           Visit Dx:      ICD-10-CM ICD-9-CM   1. Lymphedema  I89.0 457.1   2. Obesity, unspecified classification, unspecified obesity type, unspecified whether serious comorbidity present  E66.9 278.00   3. H/O vascular surgery  Z98.890 V45.89   4. Bilateral lower extremity edema  R60.0 782.3   5. Non-recurrent unilateral inguinal hernia without obstruction or gangrene  K40.90 550.90       Lymphedema     Row Name 21 1200             Subjective Pain    Able to rate subjective pain?  yes  -BC      Pre-Treatment Pain Level  2  -BC      Subjective Pain Comment  BLE's  -BC         Subjective Comments    Subjective Comments  Pt. presents w/no bandages on, no new complaints voiced.   -BC         Lymphedema Assessment    Lymphedema Classification  RLE:;LLE:;Trunk:;stage 2 (Spontaneously Irreversible);stage 3 (Lymphostatic Elephantiasis);secondary  -BC      Infections or Cellulitis?  no none currently  -BC         Posture/Observations    Posture- WNL  Posture is WNL  -BC      Observations  Edema  -BC         Lymphedema Edema Assessment    Ptting Edema Category  By grade out of 4;By severity  -BC      Pitting Edema  Severe;+ 3/4  -BC      Stemmer Sign  bilateral:;positive  -BC         Skin Changes/Observations    Location/Assessment  Lower Extremity  -BC      Lower Extremity Conditions  bilateral:;dry;hairless;scaly;crust  -BC       Lower Extremity Color/Pigment  bilateral:;purple;red;blanchable;non-blanchable;hyperpigmented;brawny;woody;fibrosis  -BC      Skin Observations Comment  BLE's improving in coloration and in scaliness, pt. feet also appear to have decreased scaliness/dry skin.   -BC         Lymphedema Sensation    Lymphedema Sensation Reports  RLE:;LLE:;numbness;tingling  -BC      Lymphedema Sensation Tests  light touch;deep touch  -BC      Lymphedema Light Touch  absent sensation;LLE:;RLE:  -BC      Lymphedema Deep Touch  RLE:;LLE:;severe impairment RLE more impaired  -BC         Lymphedema Pulses/Capillary Refill    Lymphedema Pulses/Capillary Refill  capillary refill  -BC      Capillary Refill  lower extremity capillary refill  -BC      Lower Extremity Capillary Refill  left:;right:;greater than 3 seconds  -BC         Lymphedema Measurements    Measurement Type(s)  Quick Girth;Circumferential  -BC      Quick Girth Areas  Lower extremities  -BC      Circumferential Areas  Trunk  -BC         LLE Quick Girth (cm)    Met-heads  33 cm  -BC      Mid foot  34.8 cm  -BC      Smallest ankle  37.2 cm  -BC      Largest calf  55.3 cm  -BC      Tib tuberosity  49.5 cm  -BC      Mid patella  61.5 cm  -BC      Distal thigh  69 cm  -BC      Other 1  40 cm  -BC      Other 2  47.6 cm  -BC         RLE Quick Girth (cm)    Met-heads  34.8 cm  -BC      Mid foot  36.5 cm  -BC      Smallest ankle  40 cm  -BC      Largest calf  59.8 cm  -BC      Tib tuberosity  53 cm  -BC      Mid patella  59 cm  -BC      Distal thigh  68 cm  -BC      Other 1  43.3 cm  -BC      Other 2  49 cm  -BC      RLE Quick Girth Total  443.4  -BC         Trunk Circumferential (cm)    Measurement Location 1  Chest  -BC      Measurement Location 2  Navel  -BC         Manual Lymphatic Drainage    Manual Lymphatic Drainage  extremity treatment  -BC      Extremity Treatment  MLD to full limb  -BC      MLD to Full Limb  BLE's  -BC      Manual Lymphatic Drainage Comments  Pliability  improving BLE's/ feet  -BC      Manual Therapy  MLD to Abd., BLE's.  -BC         Compression/Skin Care    Compression/Skin Care  bandaging;skin care;wrapping location  -BC      Skin Care  moisturizing lotion applied  -BC      Wrapping Location  lower extremity  -BC      Wrapping Location LE  bilateral: base of toes to knees; pt. requests no toe wrappings today  -BC      Bandage Layers  short-stretch bandages (comment size/quantity);cotton elastic stocking- single layer (comment size);soft foam- 1/4 inch  -BC      Bandaging Technique  circumferential/spiral;moderate compression  -BC      Compression/Skin Care Comments  Compression pump x Abd., BLE's  -BC        User Key  (r) = Recorded By, (t) = Taken By, (c) = Cosigned By    Initials Name Provider Type    Vera Mitchell OT Occupational Therapist                  OT Assessment/Plan     Row Name 07/08/21 1705          OT Assessment    Assessment Comments  Pt. positioned in supported recline for manual lymph drainage, compression pump, skin care and multi layered bandaging of BLE's, base of toes to below knees.  -BC       User Key  (r) = Recorded By, (t) = Taken By, (c) = Cosigned By    Initials Name Provider Type    Vera Mitchell OT Occupational Therapist                    Therapy Education  Given: Symptoms/condition management  Program: Reinforced  How Provided: Verbal  Provided to: Patient  Level of Understanding: Verbalized                Time Calculation:   OT Start Time: 1150  OT Stop Time: 1330  OT Time Calculation (min): 100 min  OT Non-Billable Time (min): 20 min     Therapy Charges for Today     Code Description Service Date Service Provider Modifiers Qty    41596891771 HC OT MANUAL THERAPY EA 15 MIN 7/8/2021 Vera Bowman OT GO, KX 3    96322581063 HC OT LYMPHEDEMA MANAGEMENT-15 MIN 7/8/2021 Vera Bowman OT KX 1    02716201793 HC OT SELF CARE/MGMT/TRAIN EA 15 MIN 7/8/2021 Vera Bowman OT GO, KX 2    [id number]  OT VASOPNEUMAT DEVIC  1 OR MORE AREAS 7/8/2021 Col[place], PAGE Gamez KX 1                      Vera Bowman OT  7/8/2021

## 2021-07-14 ENCOUNTER — HOSPITAL ENCOUNTER (OUTPATIENT)
Dept: OCCUPATIONAL THERAPY | Facility: HOSPITAL | Age: 56
Setting detail: THERAPIES SERIES
Discharge: HOME | End: 2021-07-14

## 2021-07-14 DIAGNOSIS — R60.0 BILATERAL LOWER EXTREMITY EDEMA: ICD-10-CM

## 2021-07-14 DIAGNOSIS — I89.0 LYMPHEDEMA: Primary | ICD-10-CM

## 2021-07-14 DIAGNOSIS — Z98.890 H/O VASCULAR SURGERY: ICD-10-CM

## 2021-07-14 DIAGNOSIS — E66.9 OBESITY, UNSPECIFIED CLASSIFICATION, UNSPECIFIED OBESITY TYPE, UNSPECIFIED WHETHER SERIOUS COMORBIDITY PRESENT: ICD-10-CM

## 2021-07-14 PROCEDURE — 97016 VASOPNEUMATIC DEVICE THERAPY: CPT

## 2021-07-14 PROCEDURE — 97140 MANUAL THERAPY 1/> REGIONS: CPT

## 2021-07-14 PROCEDURE — 97139 UNLISTED THERAPEUTIC PX: CPT

## 2021-07-14 PROCEDURE — 97535 SELF CARE MNGMENT TRAINING: CPT

## 2021-07-14 NOTE — THERAPY TREATMENT NOTE
"Outpatient Occupational Therapy Lymphedema Treatment Note   Gavin     Patient Name: El Nielsen  : 1965  MRN: [id number]  Today's Date: 2021      Visit Date: 2021    Patient Active Problem List   Diagnosis   • Non-recurrent unilateral inguinal hernia without obstruction or gangrene        Past Medical History:   Diagnosis Date   • Arthritis    • Elevated cholesterol    • GERD (gastroesophageal reflux disease)    • Hypertension    • Sleep apnea         Past Surgical History:   Procedure Laterality Date   • EYE SURGERY     • MENISCECTOMY Left    • VASCULAR SURGERY           Visit Dx:      ICD-10-CM ICD-9-CM   1. Lymphedema  I89.0 457.1   2. Obesity, unspecified classification, unspecified obesity type, unspecified whether serious comorbidity present  E66.9 278.00   3. H/O vascular surgery  Z98.890 V45.89   4. Bilateral lower extremity edema  R60.0 782.3       Lymphedema     Row Name 21 1000             Subjective Pain    Able to rate subjective pain?  yes  -AB      Pre-Treatment Pain Level  4  -AB      Subjective Pain Comment  BLE's  -AB         Subjective Comments    Subjective Comments  Pt. reports that his feet have been more sensitive lately. He states they feel more like \"pins and needles\".   -AB         Lymphedema Assessment    Lymphedema Classification  RLE:;LLE:;Trunk:;stage 2 (Spontaneously Irreversible);stage 3 (Lymphostatic Elephantiasis);secondary  -AB      Infections or Cellulitis?  no none currently  -AB         Posture/Observations    Posture- WNL  Posture is WNL  -AB      Observations  Edema  -AB         Lymphedema Edema Assessment    Ptting Edema Category  By grade out of 4;By severity  -AB      Pitting Edema  Severe;+ 3/4  -AB      Stemmer Sign  bilateral:;positive  -AB         Skin Changes/Observations    Location/Assessment  Lower Extremity  -AB      Lower Extremity Conditions  bilateral:;dry;hairless;scaly;crust  -AB      Lower Extremity Color/Pigment  " bilateral:;purple;red;blanchable;non-blanchable;hyperpigmented;brawny;woody;fibrosis  -AB         Lymphedema Sensation    Lymphedema Sensation Reports  RLE:;LLE:;numbness;tingling  -AB      Lymphedema Sensation Tests  light touch;deep touch  -AB      Lymphedema Light Touch  absent sensation;LLE:;RLE:  -AB      Lymphedema Deep Touch  RLE:;LLE:;severe impairment RLE more impaired  -AB         Lymphedema Pulses/Capillary Refill    Lymphedema Pulses/Capillary Refill  --  -AB      Capillary Refill  --  -AB      Lower Extremity Capillary Refill  --  -AB         Lymphedema Measurements    Measurement Type(s)  Quick Girth;Circumferential  -AB      Quick Girth Areas  Lower extremities  -AB      Circumferential Areas  Trunk  -AB         LLE Quick Girth (cm)    Met-heads  33.3 cm  -AB      Mid foot  34.7 cm  -AB      Smallest ankle  38.6 cm  -AB      Largest calf  56.2 cm  -AB      Tib tuberosity  49.4 cm  -AB      Mid patella  61.9 cm  -AB      Distal thigh  67.6 cm  -AB      Other 1  41 cm  -AB      Other 2  48 cm  -AB         RLE Quick Girth (cm)    Met-heads  35.2 cm  -AB      Mid foot  37.4 cm  -AB      Smallest ankle  41.3 cm  -AB      Largest calf  62.3 cm  -AB      Tib tuberosity  53.4 cm  -AB      Mid patella  60.4 cm  -AB      Distal thigh  67 cm  -AB      Other 1  44.2 cm  -AB      Other 2  51 cm  -AB      RLE Quick Girth Total  452.2  -AB         Trunk Circumferential (cm)    Measurement Location 1  Chest  -AB      Measurement Location 2  Navel  -AB         Manual Lymphatic Drainage    Manual Lymphatic Drainage  extremity treatment  -AB      Extremity Treatment  MLD to full limb  -AB      MLD to Full Limb  BLE's  -AB      Manual Lymphatic Drainage Comments  vibration to BLE's  -AB      Manual Therapy  MLD to abdomen, inguinals, BLE's  -AB         Compression/Skin Care    Compression/Skin Care  bandaging;skin care;wrapping location  -AB      Skin Care  other (comment) medicated lotion applied per pt. directive  -AB       Wrapping Location  lower extremity  -AB      Wrapping Location LE  bilateral: toes to knees  -AB      Bandage Layers  short-stretch bandages (comment size/quantity);cotton elastic stocking- single layer (comment size);soft foam- 1/4 inch  -AB      Bandaging Technique  circumferential/spiral;moderate compression  -AB      Compression/Skin Care Comments  compression pump to abdomen and BLE's  -AB        User Key  (r) = Recorded By, (t) = Taken By, (c) = Cosigned By    Initials Name Provider Type    AB Chely Sierra OT Occupational Therapist                                Therapy Education  Given: HEP, Edema management, Symptoms/condition management, Bandaging/dressing change  Program: Reinforced  How Provided: Verbal, Demonstration  Provided to: Patient  Level of Understanding: Verbalized                Time Calculation:   OT Start Time: 1000  OT Stop Time: 1145  OT Time Calculation (min): 105 min  OT Non-Billable Time (min): 25 min  Total Timed Code Minutes- OT: 105 minute(s)     Therapy Charges for Today     Code Description Service Date Service Provider Modifiers Qty    22940191817 HC OT MANUAL THERAPY EA 15 MIN 7/14/2021 Chely Sierra OT JEREMY, KX 4    01636765853 HC OT LYMPHEDEMA MANAGEMENT-15 MIN 7/14/2021 Chely Sierra OT KX 1    86623606600 HC OT VASOPNEUMAT DEVIC 1 OR MORE AREAS 7/14/2021 Chely Sierra OT JEREMY, KX 1    84960014382 HC OT SELF CARE/MGMT/TRAIN EA 15 MIN 7/14/2021 Chely Sierra OT GO KX 1                      Chely Sierra OT  7/14/2021

## 2021-07-19 ENCOUNTER — HOSPITAL ENCOUNTER (OUTPATIENT)
Dept: OCCUPATIONAL THERAPY | Facility: HOSPITAL | Age: 56
Setting detail: THERAPIES SERIES
Discharge: HOME | End: 2021-07-19

## 2021-07-19 ENCOUNTER — APPOINTMENT (OUTPATIENT)
Dept: OCCUPATIONAL THERAPY | Facility: HOSPITAL | Age: 56
End: 2021-07-19

## 2021-07-19 DIAGNOSIS — Z98.890 H/O VASCULAR SURGERY: ICD-10-CM

## 2021-07-19 DIAGNOSIS — I89.0 LYMPHEDEMA: Primary | ICD-10-CM

## 2021-07-19 DIAGNOSIS — R60.0 BILATERAL LOWER EXTREMITY EDEMA: ICD-10-CM

## 2021-07-19 DIAGNOSIS — E66.9 OBESITY, UNSPECIFIED CLASSIFICATION, UNSPECIFIED OBESITY TYPE, UNSPECIFIED WHETHER SERIOUS COMORBIDITY PRESENT: ICD-10-CM

## 2021-07-19 PROCEDURE — 97139 UNLISTED THERAPEUTIC PX: CPT

## 2021-07-19 PROCEDURE — 97535 SELF CARE MNGMENT TRAINING: CPT

## 2021-07-19 PROCEDURE — 97016 VASOPNEUMATIC DEVICE THERAPY: CPT

## 2021-07-19 PROCEDURE — 97140 MANUAL THERAPY 1/> REGIONS: CPT

## 2021-07-19 NOTE — THERAPY TREATMENT NOTE
Outpatient Occupational Therapy Lymphedema Treatment Note  CASSANDRA Alonso     Patient Name: El Nielsen  : 1965  MRN: [id number]  Today's Date: 2021      Visit Date: 2021    Patient Active Problem List   Diagnosis   • Non-recurrent unilateral inguinal hernia without obstruction or gangrene        Past Medical History:   Diagnosis Date   • Arthritis    • Elevated cholesterol    • GERD (gastroesophageal reflux disease)    • Hypertension    • Sleep apnea         Past Surgical History:   Procedure Laterality Date   • EYE SURGERY     • MENISCECTOMY Left    • VASCULAR SURGERY           Visit Dx:      ICD-10-CM ICD-9-CM   1. Lymphedema  I89.0 457.1   2. Obesity, unspecified classification, unspecified obesity type, unspecified whether serious comorbidity present  E66.9 278.00   3. H/O vascular surgery  Z98.890 V45.89   4. Bilateral lower extremity edema  R60.0 782.3       Lymphedema     Row Name 21 1200             Subjective Pain    Able to rate subjective pain?  yes  -AB      Pre-Treatment Pain Level  4  -AB      Subjective Pain Comment  BLE's  -AB         Subjective Comments    Subjective Comments  Pt. presents w/ no new c/o this date.   -AB         Lymphedema Assessment    Lymphedema Classification  RLE:;LLE:;Trunk:;stage 2 (Spontaneously Irreversible);stage 3 (Lymphostatic Elephantiasis);secondary  -AB      Infections or Cellulitis?  no none currently  -AB         Posture/Observations    Posture- WNL  Posture is WNL  -AB      Observations  Edema  -AB         Lymphedema Edema Assessment    Ptting Edema Category  By grade out of 4;By severity  -AB      Pitting Edema  Severe;+ 4/4  -AB      Stemmer Sign  bilateral:;positive  -AB         Skin Changes/Observations    Location/Assessment  Lower Extremity  -AB      Lower Extremity Conditions  bilateral:;dry;hairless;scaly;crust  -AB      Lower Extremity Color/Pigment   bilateral:;purple;red;blanchable;non-blanchable;hyperpigmented;brawny;woody;fibrosis  -AB      Skin Observations Comment  decreased scaliness noted  -AB         Lymphedema Sensation    Lymphedema Sensation Reports  RLE:;LLE:;numbness;tingling  -AB      Lymphedema Sensation Tests  light touch;deep touch  -AB      Lymphedema Light Touch  absent sensation;LLE:;RLE:  -AB      Lymphedema Deep Touch  RLE:;LLE:;severe impairment RLE more impaired  -AB         Lymphedema Measurements    Measurement Type(s)  Quick Girth;Circumferential  -AB      Quick Girth Areas  Lower extremities  -AB      Circumferential Areas  Trunk  -AB         LLE Quick Girth (cm)    Met-heads  32 cm  -AB      Mid foot  32.8 cm  -AB      Smallest ankle  39.5 cm  -AB      Largest calf  58.7 cm  -AB      Tib tuberosity  50.6 cm  -AB      Mid patella  61.5 cm  -AB      Distal thigh  70.4 cm  -AB      Other 1  41.4 cm  -AB      Other 2  47.9 cm  -AB         RLE Quick Girth (cm)    Met-heads  35.9 cm  -AB      Mid foot  36.3 cm  -AB      Smallest ankle  42.9 cm  -AB      Largest calf  63.9 cm  -AB      Tib tuberosity  53.9 cm  -AB      Mid patella  61.4 cm  -AB      Distal thigh  67.4 cm  -AB      Other 1  44.4 cm  -AB      Other 2  51.4 cm  -AB      RLE Quick Girth Total  457.5  -AB         Trunk Circumferential (cm)    Measurement Location 1  Chest  -AB      Measurement Location 2  Navel  -AB         Manual Lymphatic Drainage    Manual Lymphatic Drainage  extremity treatment  -AB      Extremity Treatment  MLD to full limb  -AB      MLD to Full Limb  BLE's  -AB      Manual Therapy  MLD to BLE's, abdomen, inguinals  -AB         Compression/Skin Care    Compression/Skin Care  bandaging;skin care;wrapping location  -AB      Skin Care  moisturizing lotion applied  -AB      Wrapping Location  lower extremity  -AB      Wrapping Location LE  bilateral: base of toes to knees  -AB      Bandage Layers  short-stretch bandages (comment size/quantity);cotton elastic  stocking- single layer (comment size);soft foam- 1/4 inch  -AB      Bandaging Technique  circumferential/spiral;moderate compression  -AB      Compression/Skin Care Comments  compression pump to abdomen and BLE's  -AB        User Key  (r) = Recorded By, (t) = Taken By, (c) = Cosigned By    Initials Name Provider Type    Chely Mckenna OT Occupational Therapist                                Therapy Education  Given: HEP, Edema management, Symptoms/condition management, Bandaging/dressing change  Program: Reinforced  How Provided: Verbal, Demonstration  Provided to: Patient  Level of Understanding: Verbalized                Time Calculation:   OT Start Time: 1140  OT Stop Time: 1320  OT Time Calculation (min): 100 min  OT Non-Billable Time (min): 25 min  Total Timed Code Minutes- OT: 100 minute(s)     Therapy Charges for Today     Code Description Service Date Service Provider Modifiers Qty    27631827203  OT MANUAL THERAPY EA 15 MIN 7/19/2021 Chely Sierra OT EFFIE LUNAX 4    83209670281 HC OT LYMPHEDEMA MANAGEMENT-15 MIN 7/19/2021 Chely Sierra OT KX 1    [id number]  OT VASOPNEUMAT DEVIC 1 OR MORE AREAS 7/19/2021 Chely Sierra OT JEREMY, KX 1    82186648949 HC OT SELF CARE/MGMT/TRAIN EA 15 MIN 7/19/2021 Chely Sierra OT JEREMY, KX 1                      Chely Sierra OT  7/19/2021

## 2021-07-21 ENCOUNTER — HOSPITAL ENCOUNTER (OUTPATIENT)
Dept: OCCUPATIONAL THERAPY | Facility: HOSPITAL | Age: 56
Setting detail: RECURRING SERIES
Discharge: HOME | End: 2021-07-21

## 2021-07-21 DIAGNOSIS — K40.90 NON-RECURRENT UNILATERAL INGUINAL HERNIA WITHOUT OBSTRUCTION OR GANGRENE: ICD-10-CM

## 2021-07-21 DIAGNOSIS — E66.9 OBESITY, UNSPECIFIED CLASSIFICATION, UNSPECIFIED OBESITY TYPE, UNSPECIFIED WHETHER SERIOUS COMORBIDITY PRESENT: ICD-10-CM

## 2021-07-21 DIAGNOSIS — R60.0 BILATERAL LOWER EXTREMITY EDEMA: ICD-10-CM

## 2021-07-21 DIAGNOSIS — I89.0 LYMPHEDEMA: Primary | ICD-10-CM

## 2021-07-21 DIAGNOSIS — Z98.890 H/O VASCULAR SURGERY: ICD-10-CM

## 2021-07-21 PROCEDURE — 97139 UNLISTED THERAPEUTIC PX: CPT

## 2021-07-21 PROCEDURE — 97140 MANUAL THERAPY 1/> REGIONS: CPT

## 2021-07-21 PROCEDURE — 97535 SELF CARE MNGMENT TRAINING: CPT

## 2021-07-21 PROCEDURE — 97016 VASOPNEUMATIC DEVICE THERAPY: CPT

## 2021-07-21 NOTE — THERAPY TREATMENT NOTE
Outpatient Occupational Therapy Lymphedema Treatment Note  CASSANDRA Alonso     Patient Name: El Nielsen  : 1965  MRN: [id number]  Today's Date: 2021      Visit Date: 2021    Patient Active Problem List   Diagnosis   • Non-recurrent unilateral inguinal hernia without obstruction or gangrene        Past Medical History:   Diagnosis Date   • Arthritis    • Elevated cholesterol    • GERD (gastroesophageal reflux disease)    • Hypertension    • Sleep apnea         Past Surgical History:   Procedure Laterality Date   • EYE SURGERY     • MENISCECTOMY Left    • VASCULAR SURGERY           Visit Dx:      ICD-10-CM ICD-9-CM   1. Lymphedema  I89.0 457.1   2. Obesity, unspecified classification, unspecified obesity type, unspecified whether serious comorbidity present  E66.9 278.00   3. H/O vascular surgery  Z98.890 V45.89   4. Bilateral lower extremity edema  R60.0 782.3   5. Non-recurrent unilateral inguinal hernia without obstruction or gangrene  K40.90 550.90       Lymphedema     Row Name 21 1100             Subjective Pain    Able to rate subjective pain?  yes  -AB      Pre-Treatment Pain Level  2  -AB      Subjective Pain Comment  BLE's  -AB         Subjective Comments    Subjective Comments  Pt. presents this date w/ no new c/o.   -AB         Lymphedema Assessment    Lymphedema Classification  RLE:;LLE:;Trunk:;stage 2 (Spontaneously Irreversible);stage 3 (Lymphostatic Elephantiasis);secondary  -AB      Infections or Cellulitis?  no none currently  -AB         Posture/Observations    Posture- WNL  Posture is WNL  -AB      Observations  Edema  -AB         Lymphedema Edema Assessment    Ptting Edema Category  By grade out of 4;By severity  -AB      Pitting Edema  Severe;+ 4/4  -AB      Stemmer Sign  bilateral:;positive  -AB         Skin Changes/Observations    Location/Assessment  Lower Extremity  -AB      Lower Extremity Conditions  bilateral:;dry;hairless;scaly;crust  -AB      Lower Extremity  Color/Pigment  bilateral:;purple;red;blanchable;non-blanchable;hyperpigmented;brawny;woody;fibrosis  -AB         Lymphedema Sensation    Lymphedema Sensation Reports  RLE:;LLE:;numbness;tingling  -AB      Lymphedema Sensation Tests  light touch;deep touch  -AB      Lymphedema Light Touch  absent sensation;LLE:;RLE:  -AB      Lymphedema Deep Touch  RLE:;LLE:;severe impairment RLE more impaired  -AB         Lymphedema Measurements    Measurement Type(s)  Quick Girth;Circumferential  -AB      Quick Girth Areas  Lower extremities  -AB      Circumferential Areas  Trunk  -AB         LLE Quick Girth (cm)    Met-heads  32 cm  -AB      Mid foot  33 cm  -AB      Smallest ankle  38.3 cm  -AB      Largest calf  55.2 cm  -AB      Tib tuberosity  49.8 cm  -AB      Mid patella  61 cm  -AB      Distal thigh  69.7 cm  -AB      Other 1  40.5 cm  -AB      Other 2  46.9 cm  -AB         RLE Quick Girth (cm)    Met-heads  33.1 cm  -AB      Mid foot  36.1 cm  -AB      Smallest ankle  40.9 cm  -AB      Largest calf  59.9 cm  -AB      Tib tuberosity  52 cm  -AB      Mid patella  64 cm  -AB      Distal thigh  70.4 cm  -AB      Other 1  44.6 cm  -AB      Other 2  50.3 cm  -AB      RLE Quick Girth Total  451.3  -AB         Trunk Circumferential (cm)    Measurement Location 1  Chest  -AB      Measurement Location 2  Navel  -AB         Manual Lymphatic Drainage    Manual Lymphatic Drainage  extremity treatment  -AB      Extremity Treatment  MLD to full limb  -AB      MLD to Full Limb  BLE's  -AB      Manual Therapy  MLD to BLE's, abdomen, inguinals  -AB         Compression/Skin Care    Compression/Skin Care  bandaging;skin care;wrapping location  -AB      Skin Care  moisturizing lotion applied  -AB      Wrapping Location  lower extremity  -AB      Wrapping Location LE  bilateral: toes to knees  -AB      Bandage Layers  short-stretch bandages (comment size/quantity);cotton elastic stocking- single layer (comment size);soft foam- 1/4 inch  -AB       Bandaging Technique  circumferential/spiral;moderate compression  -AB      Compression/Skin Care Comments  compression pump to abdomen and BLE's  -AB        User Key  (r) = Recorded By, (t) = Taken By, (c) = Cosigned By    Initials Name Provider Type    Chely Mckenna OT Occupational Therapist                                Therapy Education  Given: HEP, Edema management, Symptoms/condition management, Bandaging/dressing change  Program: Reinforced  How Provided: Verbal, Demonstration  Provided to: Patient  Level of Understanding: Verbalized                Time Calculation:   OT Start Time: 1000  OT Stop Time: 1140  OT Time Calculation (min): 100 min  OT Non-Billable Time (min): 25 min  Total Timed Code Minutes- OT: 100 minute(s)     Therapy Charges for Today     Code Description Service Date Service Provider Modifiers Qty    30468294518  OT MANUAL THERAPY EA 15 MIN 7/21/2021 Chely Sierra OT JEREMY, KX 4    [id number]  OT LYMPHEDEMA MANAGEMENT-15 MIN 7/21/2021 Chely Sierra OT KX 1    [id number]  OT VASOPNEUMAT DEVIC 1 OR MORE AREAS 7/21/2021 Chely Sierra OT JEREMY, KX 1    47523924049 HC OT SELF CARE/MGMT/TRAIN EA 15 MIN 7/21/2021 Chely Sierra OT JEREMY KX 1                      Chely Sierra OT  7/21/2021

## 2021-07-28 ENCOUNTER — HOSPITAL ENCOUNTER (OUTPATIENT)
Dept: OCCUPATIONAL THERAPY | Facility: HOSPITAL | Age: 56
Setting detail: THERAPIES SERIES
Discharge: HOME | End: 2021-07-28

## 2021-07-28 DIAGNOSIS — K40.90 NON-RECURRENT UNILATERAL INGUINAL HERNIA WITHOUT OBSTRUCTION OR GANGRENE: ICD-10-CM

## 2021-07-28 DIAGNOSIS — Z98.890 H/O VASCULAR SURGERY: ICD-10-CM

## 2021-07-28 DIAGNOSIS — R60.0 BILATERAL LOWER EXTREMITY EDEMA: ICD-10-CM

## 2021-07-28 DIAGNOSIS — E66.9 OBESITY, UNSPECIFIED CLASSIFICATION, UNSPECIFIED OBESITY TYPE, UNSPECIFIED WHETHER SERIOUS COMORBIDITY PRESENT: ICD-10-CM

## 2021-07-28 DIAGNOSIS — I89.0 LYMPHEDEMA: Primary | ICD-10-CM

## 2021-07-28 PROCEDURE — 97168 OT RE-EVAL EST PLAN CARE: CPT

## 2021-07-28 PROCEDURE — 97139 UNLISTED THERAPEUTIC PX: CPT

## 2021-07-28 PROCEDURE — 97016 VASOPNEUMATIC DEVICE THERAPY: CPT

## 2021-07-28 PROCEDURE — 97140 MANUAL THERAPY 1/> REGIONS: CPT

## 2021-07-28 PROCEDURE — 97535 SELF CARE MNGMENT TRAINING: CPT

## 2021-07-28 NOTE — THERAPY RE-EVALUATION
Outpatient Occupational Therapy Lymphedema Re-Evaluation   G[facility]     Patient Name: El Nielsen  : 1965  MRN: [id number]  Today's Date: 2021      Visit Date: 2021    Patient Active Problem List   Diagnosis   • Non-recurrent unilateral inguinal hernia without obstruction or gangrene        Past Medical History:   Diagnosis Date   • Arthritis    • Elevated cholesterol    • GERD (gastroesophageal reflux disease)    • Hypertension    • Sleep apnea         Past Surgical History:   Procedure Laterality Date   • EYE SURGERY     • MENISCECTOMY Left    • VASCULAR SURGERY           Visit Dx:     ICD-10-CM ICD-9-CM   1. Lymphedema  I89.0 457.1   2. Obesity, unspecified classification, unspecified obesity type, unspecified whether serious comorbidity present  E66.9 278.00   3. H/O vascular surgery  Z98.890 V45.89   4. Bilateral lower extremity edema  R60.0 782.3   5. Non-recurrent unilateral inguinal hernia without obstruction or gangrene  K40.90 550.90           Lymphedema     Row Name 21 1300 21 1200          Subjective Pain    Able to rate subjective pain?  --  yes  -BC     Pre-Treatment Pain Level  --  3  -BC     Subjective Pain Comment  --  BLE's  -BC        Subjective Comments    Subjective Comments  --  Pt. presents with no new complaints voiced.  -BC        Lymphedema Assessment    Lymphedema Classification  --  RLE:;LLE:;Trunk:;stage 2 (Spontaneously Irreversible);stage 3 (Lymphostatic Elephantiasis);secondary  -BC     Infections or Cellulitis?  --  no none currently  -BC        Posture/Observations    Posture- WNL  --  Posture is WNL  -BC     Observations  --  Edema  -BC        Lymphedema Edema Assessment    Ptting Edema Category  --  By grade out of 4;By severity  -BC     Pitting Edema  --  Severe;+ 4/4  -BC     Stemmer Sign  --  bilateral:;positive  -BC        Skin Changes/Observations    Location/Assessment  --  Lower Extremity  -BC     Lower Extremity Conditions  --   bilateral:;dry;hairless;scaly;crust  -BC     Lower Extremity Color/Pigment  --  bilateral:;purple;red;blanchable;non-blanchable;hyperpigmented;brawny;woody;fibrosis  -BC     Skin Observations Comment  --  Cont. improvement noted in discoloration/scaliness  -BC        Lymphedema Sensation    Lymphedema Sensation Reports  --  RLE:;LLE:;numbness;tingling  -BC     Lymphedema Sensation Tests  --  light touch;deep touch  -BC     Lymphedema Light Touch  --  absent sensation;LLE:;RLE:  -BC     Lymphedema Deep Touch  --  RLE:;LLE:;severe impairment RLE more impaired  -BC        Lymphedema Measurements    Measurement Type(s)  --  Quick Girth;Circumferential  -BC     Quick Girth Areas  --  Lower extremities  -BC     Circumferential Areas  --  Trunk  -BC        LLE Quick Girth (cm)    Met-heads  --  33 cm  -BC     Mid foot  --  33.3 cm  -BC     Smallest ankle  --  36.5 cm  -BC     Largest calf  --  58.1 cm  -BC     Tib tuberosity  --  49 cm  -BC     Mid patella  --  59.2 cm  -BC     Distal thigh  --  68 cm  -BC     Other 1  --  40.1 cm  -BC     Other 2  --  50 cm  -BC        RLE Quick Girth (cm)    Met-heads  --  -BC  34 cm  -BC     Mid foot  --  -BC  35.2 cm  -BC     Smallest ankle  --  -BC  40 cm  -BC     Largest calf  --  -BC  64 cm  -BC     Tib tuberosity  --  -BC  53.7 cm  -BC     Mid patella  --  -BC  56.9 cm  -BC     Distal thigh  --  -BC  68 cm  -BC     Other 1  --  -BC  44.2 cm  -BC     Other 2  --  -BC  52 cm  -BC     RLE Quick Girth Total  --  -BC  448  -BC        Trunk Circumferential (cm)    Measurement Location 1  --  Chest  -BC     Measurement Location 2  --  Navel  -BC        Manual Lymphatic Drainage    Manual Lymphatic Drainage  --  extremity treatment  -BC     Extremity Treatment  --  MLD to full limb  -BC     MLD to Full Limb  --  BLE's  -BC     Manual Therapy  --  MLD to BLE's, Abd.  -BC        Compression/Skin Care    Compression/Skin Care  --  bandaging;skin care;wrapping location  -BC     Skin Care  --   topical anti-fungal applied  -BC     Wrapping Location  --  lower extremity  -BC     Wrapping Location LE  --  bilateral: toes to knees  -BC     Bandage Layers  --  short-stretch bandages (comment size/quantity);cotton elastic stocking- single layer (comment size);soft foam- 1/4 inch  -BC     Bandaging Technique  --  circumferential/spiral;moderate compression  -BC     Compression/Skin Care Comments  --  Compression pump x Abd., BLE's  -BC       User Key  (r) = Recorded By, (t) = Taken By, (c) = Cosigned By    Initials Name Provider Type    Vera Mitchell OT Occupational Therapist                  Therapy Education  Given: Edema management, Symptoms/condition management, Bandaging/dressing change  Program: Reinforced  How Provided: Verbal  Provided to: Patient  Level of Understanding: Verbalized        OT Goals     Row Name 07/28/21 1500          OT Short Term Goals    STG Date to Achieve  08/28/21  -BC     STG 1  Pt. will decrease pitting edema to 3to4/4 to decrease risk of infection.   -BC     STG 1 Progress  Met;Ongoing  -BC     STG 2  Pt. will reduce overall girth by 5cm to decrease risk of infection.   -BC     STG 2 Progress  Met  -BC     STG 3  Pt. will be more compliant w/ HEP to assist w/ lymphatic flow.   -BC     STG 3 Progress  Partially Met;Ongoing  -BC        Long Term Goals    LTG Date to Achieve  10/28/21  -BC     LTG 1  Pt. will decrease pitting edema to 3/4 to decrease risk of infection.   -BC     LTG 1 Progress  Met;Ongoing  -BC     LTG 2  Pt. will reduce overall girth by 10cm to decrease risk of infection.   -BC     LTG 2 Progress  Partially Met;Ongoing  -BC     LTG 3  Pt. will be independent w/ donning of reduction kit.   -BC     LTG 3 Progress  Ongoing  -BC        Time Calculation    OT Goal Re-Cert Due Date  10/28/21  -BC       User Key  (r) = Recorded By, (t) = Taken By, (c) = Cosigned By    Initials Name Provider Type    Vera Mitchell OT Occupational Therapist          OT  Assessment/Plan     Row Name 07/28/21 1533          OT Assessment    Functional Limitations  Decreased safety during functional activities;Limitations in functional capacity and performance  -BC     Impairments  Balance;Coordination;Edema;Endurance;Gait;Impaired aerobic capacity;Impaired flexibility;Impaired lymphatic circulation;Impaired muscle endurance;Impaired sensory integrity;Pain;Range of motion;Sensation  -BC     Assessment Comments  Pt. positioned in supported recline for manual lymph drainage, compression pump, skin care and multi layered bandaging of BLE's, base of toes to below knees.  -BC     Please refer to paper survey for additional self-reported information  No  -BC     OT Diagnosis  Lymphedema  -BC     OT Rehab Potential  Good  -BC     Patient/caregiver participated in establishment of treatment plan and goals  Yes  -BC     Patient would benefit from skilled therapy intervention  Yes  -BC        OT Plan    OT Frequency  1x/week  -BC     Predicted Duration of Therapy Intervention (OT)  90 days  -BC     Planned CPT's?  OT EVAL MOD COMPLEXITY: 43582;OT RE-EVAL: 07800;OT THER ACT EA 15 MIN: 56287XI;OT THER PROC EA 15 MIN: 20231RD;OT SELF CARE/MGMT/TRAIN 15 MIN: 02428;OT MANUAL THERAPY EA 15 MIN: 22783;OT BIS XTRACELL FLUID ANALYSIS: 87925;OT VASOPNEUMATIC DEVICE: 94421 lymphedema management  -BC     Planned Therapy Interventions (Optional Details)  home exercise program;patient/family education;manual therapy techniques  -BC     OT Plan Comments  Pt. is progressing well w/POC and would benefit from cont. OT to maximize functional gains.  -BC       User Key  (r) = Recorded By, (t) = Taken By, (c) = Cosigned By    Initials Name Provider Type    Vera Mitchell OT Occupational Therapist                    Time Calculation:   OT Start Time: 1200  OT Stop Time: 1345  OT Time Calculation (min): 105 min  OT Non-Billable Time (min): 20 min     Therapy Charges for Today     Code Description Service Date  Service Provider Modifiers Qty    87499403664 HC OT MANUAL THERAPY EA 15 MIN 7/28/2021 Vera Bowman OT GO, KX 2    47352576099 HC OT LYMPHEDEMA MANAGEMENT-15 MIN 7/28/2021 Vera Bowman OT KX 1    40439115984 HC OT SELF CARE/MGMT/TRAIN EA 15 MIN 7/28/2021 Vera Bowman OT GO, KX 2    55128460389 HC OT VASOPNEUMAT DEVIC 1 OR MORE AREAS 7/28/2021 Vera Bowman OT GO, KX 1    60661196601 HC OT RE-EVAL 2 7/28/2021 Vera Bowman OT GO, KX 1                    Vera Bowman OT  7/28/2021

## 2021-08-12 ENCOUNTER — HOSPITAL ENCOUNTER (OUTPATIENT)
Dept: OCCUPATIONAL THERAPY | Facility: HOSPITAL | Age: 56
Setting detail: THERAPIES SERIES
Discharge: HOME | End: 2021-08-12

## 2021-08-12 DIAGNOSIS — E66.9 OBESITY, UNSPECIFIED CLASSIFICATION, UNSPECIFIED OBESITY TYPE, UNSPECIFIED WHETHER SERIOUS COMORBIDITY PRESENT: ICD-10-CM

## 2021-08-12 DIAGNOSIS — R60.0 BILATERAL LOWER EXTREMITY EDEMA: ICD-10-CM

## 2021-08-12 DIAGNOSIS — I89.0 LYMPHEDEMA: Primary | ICD-10-CM

## 2021-08-12 DIAGNOSIS — Z98.890 H/O VASCULAR SURGERY: ICD-10-CM

## 2021-08-12 PROCEDURE — 97016 VASOPNEUMATIC DEVICE THERAPY: CPT

## 2021-08-12 PROCEDURE — 97535 SELF CARE MNGMENT TRAINING: CPT

## 2021-08-12 PROCEDURE — 97139 UNLISTED THERAPEUTIC PX: CPT

## 2021-08-12 PROCEDURE — 97140 MANUAL THERAPY 1/> REGIONS: CPT

## 2021-08-12 NOTE — THERAPY TREATMENT NOTE
Outpatient Occupational Therapy Lymphedema Treatment Note  CASSANDRA Alonso     Patient Name: El Nielsen  : 1965  MRN: [id number]  Today's Date: 2021      Visit Date: 2021    Patient Active Problem List   Diagnosis   • Non-recurrent unilateral inguinal hernia without obstruction or gangrene        Past Medical History:   Diagnosis Date   • Arthritis    • Elevated cholesterol    • GERD (gastroesophageal reflux disease)    • Hypertension    • Sleep apnea         Past Surgical History:   Procedure Laterality Date   • EYE SURGERY     • MENISCECTOMY Left    • VASCULAR SURGERY           Visit Dx:      ICD-10-CM ICD-9-CM   1. Lymphedema  I89.0 457.1   2. Obesity, unspecified classification, unspecified obesity type, unspecified whether serious comorbidity present  E66.9 278.00   3. H/O vascular surgery  Z98.890 V45.89   4. Bilateral lower extremity edema  R60.0 782.3       Lymphedema     Row Name 21 1000             Subjective Pain    Able to rate subjective pain?  yes  -AB      Pre-Treatment Pain Level  1  -AB      Subjective Pain Comment  BLE's  -AB         Subjective Comments    Subjective Comments  No new c/o voiced.   -AB         Lymphedema Assessment    Lymphedema Classification  RLE:;LLE:;Trunk:;stage 2 (Spontaneously Irreversible);stage 3 (Lymphostatic Elephantiasis);secondary  -AB      Infections or Cellulitis?  no none currently  -AB         Posture/Observations    Posture- WNL  Posture is WNL  -AB      Observations  Edema  -AB         Lymphedema Edema Assessment    Ptting Edema Category  By grade out of 4;By severity  -AB      Pitting Edema  Severe;+ 4/4  -AB      Stemmer Sign  bilateral:;positive  -AB         Skin Changes/Observations    Location/Assessment  Lower Extremity  -AB      Lower Extremity Conditions  bilateral:;dry;hairless;scaly;crust  -AB      Lower Extremity Color/Pigment  bilateral:;red;blanchable;non-blanchable;hyperpigmented;brawny;woody;fibrosis  -AB      Skin  Observations Comment  skin discoloration much improved  -AB         Lymphedema Sensation    Lymphedema Sensation Reports  RLE:;LLE:;numbness;tingling  -AB      Lymphedema Sensation Tests  light touch;deep touch  -AB      Lymphedema Light Touch  absent sensation;LLE:;RLE:  -AB      Lymphedema Deep Touch  RLE:;LLE:;severe impairment RLE more impaired  -AB         Lymphedema Measurements    Measurement Type(s)  Quick Girth;Circumferential  -AB      Quick Girth Areas  Lower extremities  -AB      Circumferential Areas  Trunk  -AB         LLE Quick Girth (cm)    Met-heads  34.4 cm  -AB      Mid foot  35.8 cm  -AB      Smallest ankle  40 cm  -AB      Largest calf  58.8 cm  -AB      Tib tuberosity  51.2 cm  -AB      Mid patella  59.6 cm  -AB      Distal thigh  69.7 cm  -AB      Other 1  40.9 cm  -AB      Other 2  48.4 cm  -AB         RLE Quick Girth (cm)    Met-heads  35.5 cm  -AB      Mid foot  38.8 cm  -AB      Smallest ankle  44.2 cm  -AB      Largest calf  61.5 cm  -AB      Tib tuberosity  54 cm  -AB      Mid patella  60.6 cm  -AB      Distal thigh  68.3 cm  -AB      Other 1  44.2 cm  -AB      Other 2  50.8 cm  -AB      RLE Quick Girth Total  457.9  -AB         Trunk Circumferential (cm)    Measurement Location 1  Chest  -AB      Measurement Location 2  Navel  -AB         Manual Lymphatic Drainage    Manual Lymphatic Drainage  extremity treatment  -AB      Extremity Treatment  MLD to full limb  -AB      MLD to Full Limb  BLE's  -AB      Manual Therapy  MLD to BLE's, abdomen, inguinals, axillary  -AB         Compression/Skin Care    Compression/Skin Care  bandaging;skin care;wrapping location  -AB      Skin Care  moisturizing lotion applied  -AB      Wrapping Location  lower extremity  -AB      Wrapping Location LE  bilateral: toes to knees  -AB      Bandage Layers  short-stretch bandages (comment size/quantity);cotton elastic stocking- single layer (comment size);soft foam- 1/4 inch  -AB      Bandaging Technique   circumferential/spiral;moderate compression  -AB      Compression/Skin Care Comments  compression pump to abdomen and BLE's  -AB        User Key  (r) = Recorded By, (t) = Taken By, (c) = Cosigned By    Initials Name Provider Type    Chely Mckenna OT Occupational Therapist                                Therapy Education  Given: HEP, Edema management, Symptoms/condition management, Bandaging/dressing change  Program: Reinforced  How Provided: Verbal, Demonstration  Provided to: Patient  Level of Understanding: Verbalized                Time Calculation:   OT Start Time: 0930  OT Stop Time: 1115  OT Time Calculation (min): 105 min  OT Non-Billable Time (min): 25 min  Total Timed Code Minutes- OT: 105 minute(s)     Therapy Charges for Today     Code Description Service Date Service Provider Modifiers Qty    66083052223  OT MANUAL THERAPY EA 15 MIN 8/12/2021 Chely Sierra OT EFFIE LUNAX 4    [id number]  OT LYMPHEDEMA MANAGEMENT-15 MIN 8/12/2021 Chely Sierra OT KX 1    [id number]  OT VASOPNEUMAT DEVIC 1 OR MORE AREAS 8/12/2021 Chely Sierra OT JEREMY, KX 1    69716999900  OT SELF CARE/MGMT/TRAIN EA 15 MIN 8/12/2021 Chely Sierra OT EFFIE LUNAX 1                      Chely Sierra OT  8/12/2021

## 2021-08-19 ENCOUNTER — APPOINTMENT (OUTPATIENT)
Dept: OCCUPATIONAL THERAPY | Facility: HOSPITAL | Age: 56
End: 2021-08-19

## 2021-08-20 ENCOUNTER — HOSPITAL ENCOUNTER (OUTPATIENT)
Dept: OCCUPATIONAL THERAPY | Facility: HOSPITAL | Age: 56
Setting detail: THERAPIES SERIES
Discharge: HOME | End: 2021-08-20

## 2021-08-20 DIAGNOSIS — E66.9 OBESITY, UNSPECIFIED CLASSIFICATION, UNSPECIFIED OBESITY TYPE, UNSPECIFIED WHETHER SERIOUS COMORBIDITY PRESENT: ICD-10-CM

## 2021-08-20 DIAGNOSIS — Z98.890 H/O VASCULAR SURGERY: ICD-10-CM

## 2021-08-20 DIAGNOSIS — I89.0 LYMPHEDEMA: Primary | ICD-10-CM

## 2021-08-20 DIAGNOSIS — R60.0 BILATERAL LOWER EXTREMITY EDEMA: ICD-10-CM

## 2021-08-20 PROCEDURE — 97140 MANUAL THERAPY 1/> REGIONS: CPT

## 2021-08-20 PROCEDURE — 97139 UNLISTED THERAPEUTIC PX: CPT

## 2021-08-20 PROCEDURE — 97016 VASOPNEUMATIC DEVICE THERAPY: CPT

## 2021-08-20 NOTE — THERAPY TREATMENT NOTE
Outpatient Occupational Therapy Lymphedema Treatment Note  CASSANDRA Alonso     Patient Name: El Nielsen  : 1965  MRN: [id number]  Today's Date: 2021      Visit Date: 2021    Patient Active Problem List   Diagnosis   • Non-recurrent unilateral inguinal hernia without obstruction or gangrene        Past Medical History:   Diagnosis Date   • Arthritis    • Elevated cholesterol    • GERD (gastroesophageal reflux disease)    • Hypertension    • Sleep apnea         Past Surgical History:   Procedure Laterality Date   • EYE SURGERY     • MENISCECTOMY Left    • VASCULAR SURGERY           Visit Dx:      ICD-10-CM ICD-9-CM   1. Lymphedema  I89.0 457.1   2. Obesity, unspecified classification, unspecified obesity type, unspecified whether serious comorbidity present  E66.9 278.00   3. H/O vascular surgery  Z98.890 V45.89   4. Bilateral lower extremity edema  R60.0 782.3       Lymphedema     Row Name 21 1200             Subjective Pain    Able to rate subjective pain?  yes  -AB      Pre-Treatment Pain Level  3  -AB      Subjective Pain Comment  BLE's  -AB         Subjective Comments    Subjective Comments  Pt. presents to tx this date w/ no new c/o.   -AB         Lymphedema Assessment    Lymphedema Classification  RLE:;LLE:;Trunk:;stage 2 (Spontaneously Irreversible);stage 3 (Lymphostatic Elephantiasis);secondary  -AB      Infections or Cellulitis?  no none currently  -AB         Posture/Observations    Posture- WNL  Posture is WNL  -AB      Observations  Edema  -AB         Lymphedema Edema Assessment    Ptting Edema Category  By grade out of 4;By severity  -AB      Pitting Edema  + 4/4;Moderate;Severe  -AB      Stemmer Sign  bilateral:;positive  -AB         Skin Changes/Observations    Location/Assessment  Lower Extremity  -AB      Lower Extremity Conditions  bilateral:;dry;hairless;scaly;crust  -AB      Lower Extremity Color/Pigment   bilateral:;red;blanchable;non-blanchable;hyperpigmented;brawny;woody;fibrosis  -AB         Lymphedema Sensation    Lymphedema Sensation Reports  RLE:;LLE:;numbness;tingling  -AB      Lymphedema Sensation Tests  light touch;deep touch  -AB      Lymphedema Light Touch  absent sensation;LLE:;RLE:  -AB      Lymphedema Deep Touch  RLE:;LLE:;severe impairment RLE more impaired  -AB         Lymphedema Measurements    Measurement Type(s)  Quick Girth;Circumferential  -AB      Quick Girth Areas  Lower extremities  -AB      Circumferential Areas  Trunk  -AB         LLE Quick Girth (cm)    Met-heads  34 cm  -AB      Mid foot  36.3 cm  -AB      Smallest ankle  38.8 cm  -AB      Largest calf  56.9 cm  -AB      Tib tuberosity  49.7 cm  -AB      Mid patella  62.9 cm  -AB      Distal thigh  67.8 cm  -AB      Other 1  40.9 cm  -AB      Other 2  48.9 cm  -AB         RLE Quick Girth (cm)    Met-heads  35.9 cm  -AB      Mid foot  37.8 cm  -AB      Smallest ankle  41.8 cm  -AB      Largest calf  61 cm  -AB      Tib tuberosity  53.5 cm  -AB      Mid patella  58 cm  -AB      Distal thigh  69.1 cm  -AB      Other 1  44 cm  -AB      Other 2  50.6 cm  -AB      RLE Quick Girth Total  451.7  -AB         Trunk Circumferential (cm)    Measurement Location 1  Chest  -AB      Measurement Location 2  Navel  -AB         Manual Lymphatic Drainage    Manual Lymphatic Drainage  extremity treatment  -AB      Extremity Treatment  MLD to full limb  -AB      MLD to Full Limb  BLE's  -AB      Manual Therapy  MLD to BLE's, abdomen, inguinals  -AB         Compression/Skin Care    Compression/Skin Care  bandaging;skin care;wrapping location  -AB      Skin Care  moisturizing lotion applied  -AB      Wrapping Location  lower extremity  -AB      Wrapping Location LE  bilateral: base of toes to knees  -AB      Bandage Layers  short-stretch bandages (comment size/quantity);cotton elastic stocking- single layer (comment size);soft foam- 1/4 inch  -AB      Bandaging  Technique  circumferential/spiral;moderate compression  -AB      Compression/Skin Care Comments  compression pump to abdomen and BLE's  -AB        User Key  (r) = Recorded By, (t) = Taken By, (c) = Cosigned By    Initials Name Provider Type    Chely Mckenna OT Occupational Therapist                                Therapy Education  Given: HEP, Edema management, Symptoms/condition management, Bandaging/dressing change  Program: Reinforced  How Provided: Verbal, Demonstration  Provided to: Patient  Level of Understanding: Verbalized                Time Calculation:   OT Start Time: 1130  OT Stop Time: 1300  OT Time Calculation (min): 90 min  OT Non-Billable Time (min): 25 min  Total Timed Code Minutes- OT: 90 minute(s)     Therapy Charges for Today     Code Description Service Date Service Provider Modifiers Qty    66893575772  OT MANUAL THERAPY EA 15 MIN 8/20/2021 Chely Sierra OT JEREMY KX 4    [id number]  OT LYMPHEDEMA MANAGEMENT-15 MIN 8/20/2021 Chely Sierra OT KX 1    [id number]  OT VASOPNEUMAT DEVIC 1 OR MORE AREAS 8/20/2021 Chely Sierra OT GO KX 1                      Chely Sierra OT  8/20/2021

## 2021-08-26 ENCOUNTER — HOSPITAL ENCOUNTER (OUTPATIENT)
Dept: OCCUPATIONAL THERAPY | Facility: HOSPITAL | Age: 56
Setting detail: THERAPIES SERIES
Discharge: HOME | End: 2021-08-26

## 2021-08-26 DIAGNOSIS — Z98.890 H/O VASCULAR SURGERY: ICD-10-CM

## 2021-08-26 DIAGNOSIS — I89.0 LYMPHEDEMA: Primary | ICD-10-CM

## 2021-08-26 DIAGNOSIS — E66.9 OBESITY, UNSPECIFIED CLASSIFICATION, UNSPECIFIED OBESITY TYPE, UNSPECIFIED WHETHER SERIOUS COMORBIDITY PRESENT: ICD-10-CM

## 2021-08-26 DIAGNOSIS — R60.0 BILATERAL LOWER EXTREMITY EDEMA: ICD-10-CM

## 2021-08-26 PROCEDURE — 97140 MANUAL THERAPY 1/> REGIONS: CPT

## 2021-08-26 PROCEDURE — 97139 UNLISTED THERAPEUTIC PX: CPT

## 2021-08-26 PROCEDURE — 97016 VASOPNEUMATIC DEVICE THERAPY: CPT

## 2021-08-26 PROCEDURE — 97535 SELF CARE MNGMENT TRAINING: CPT

## 2021-08-26 NOTE — THERAPY PROGRESS REPORT/RE-CERT
Outpatient Occupational Therapy Lymphedema Progress Note  CASSANDRA Alonso     Patient Name: lE Nielsen  : 1965  MRN: [id number]  Today's Date: 2021      Visit Date: 2021    Patient Active Problem List   Diagnosis   • Non-recurrent unilateral inguinal hernia without obstruction or gangrene        Past Medical History:   Diagnosis Date   • Arthritis    • Elevated cholesterol    • GERD (gastroesophageal reflux disease)    • Hypertension    • Sleep apnea         Past Surgical History:   Procedure Laterality Date   • EYE SURGERY     • MENISCECTOMY Left    • VASCULAR SURGERY           Visit Dx:      ICD-10-CM ICD-9-CM   1. Lymphedema  I89.0 457.1   2. Obesity, unspecified classification, unspecified obesity type, unspecified whether serious comorbidity present  E66.9 278.00   3. H/O vascular surgery  Z98.890 V45.89   4. Bilateral lower extremity edema  R60.0 782.3       Lymphedema     Row Name 21 1000             Subjective Pain    Able to rate subjective pain?  yes  -AB      Pre-Treatment Pain Level  2  -AB      Subjective Pain Comment  BLE's  -AB         Subjective Comments    Subjective Comments  Pt. presents to tx w/ no new c/o.   -AB         Lymphedema Assessment    Lymphedema Classification  RLE:;LLE:;Trunk:;stage 2 (Spontaneously Irreversible);stage 3 (Lymphostatic Elephantiasis);secondary  -AB      Infections or Cellulitis?  no none currently  -AB         Posture/Observations    Posture- WNL  Posture is WNL  -AB      Observations  Edema  -AB         Lymphedema Edema Assessment    Ptting Edema Category  By grade out of 4;By severity  -AB      Pitting Edema  Moderate;+ 3/4;+ 4/4  -AB      Stemmer Sign  bilateral:;positive  -AB         Skin Changes/Observations    Location/Assessment  Lower Extremity  -AB      Lower Extremity Conditions  bilateral:;dry;hairless;scaly;crust  -AB      Lower Extremity Color/Pigment  bilateral:;red;blanchable;non-blanchable;hyperpigmented;brawny;woody;fibrosis   -AB      Skin Observations Comment  less scaliness  -AB         Lymphedema Sensation    Lymphedema Sensation Reports  RLE:;LLE:;numbness;tingling  -AB      Lymphedema Sensation Tests  light touch;deep touch  -AB      Lymphedema Light Touch  absent sensation;LLE:;RLE:  -AB      Lymphedema Deep Touch  RLE:;LLE:;severe impairment RLE more impaired  -AB         Lymphedema Measurements    Measurement Type(s)  Quick Girth;Circumferential  -AB      Quick Girth Areas  Lower extremities  -AB      Circumferential Areas  Trunk  -AB         LLE Quick Girth (cm)    Met-heads  32.6 cm  -AB      Mid foot  33.8 cm  -AB      Smallest ankle  38.3 cm  -AB      Largest calf  55.4 cm  -AB      Tib tuberosity  49.3 cm  -AB      Mid patella  59.5 cm  -AB      Distal thigh  66 cm  -AB      Other 1  39.6 cm  -AB      Other 2  48.1 cm  -AB         RLE Quick Girth (cm)    Met-heads  33.6 cm  -AB      Mid foot  36.3 cm  -AB      Smallest ankle  41.2 cm  -AB      Largest calf  60.1 cm  -AB      Tib tuberosity  51.5 cm  -AB      Mid patella  56.7 cm  -AB      Distal thigh  67.5 cm  -AB      Other 1  43.2 cm  -AB      Other 2  50 cm  -AB      RLE Quick Girth Total  440.1  -AB         Trunk Circumferential (cm)    Measurement Location 1  Chest  -AB      Measurement Location 2  Navel  -AB         Manual Lymphatic Drainage    Manual Lymphatic Drainage  extremity treatment  -AB      Extremity Treatment  MLD to full limb  -AB      MLD to Full Limb  BLE's  -AB      Manual Lymphatic Drainage Comments  vibration to BLE's  -AB      Manual Therapy  MLD to BLE's, abdomen, inguinals  -AB         Compression/Skin Care    Compression/Skin Care  bandaging;skin care;wrapping location  -AB      Skin Care  moisturizing lotion applied  -AB      Wrapping Location  lower extremity  -AB      Wrapping Location LE  bilateral: base of toes to knees  -AB      Bandage Layers  short-stretch bandages (comment size/quantity);cotton elastic stocking- single layer (comment  size);soft foam- 1/4 inch  -AB      Bandaging Technique  circumferential/spiral;moderate compression  -AB      Compression/Skin Care Comments  compression pump to abdomen and BLE's  -AB        User Key  (r) = Recorded By, (t) = Taken By, (c) = Cosigned By    Initials Name Provider Type    Chely Mckenna, OT Occupational Therapist                  OT Assessment/Plan     Row Name 08/26/21 1448          OT Plan    OT Frequency  1x/week  -AB     Predicted Duration of Therapy Intervention (OT)  30 days  -AB     Planned Therapy Interventions (Optional Details)  home exercise program;patient/family education;manual therapy techniques  -AB     OT Plan Comments  Pt. continues to make progress w/ therapy. Although goal achievement has been slow, his skin condition has improved significantly, and he continues to have decreases in overal girth bilaterally. He will benefit from continued skilled OT services. Continue POC.  -AB       User Key  (r) = Recorded By, (t) = Taken By, (c) = Cosigned By    Initials Name Provider Type    Chely Mckenna, OT Occupational Therapist                OT Goals     Row Name 08/26/21 1400          OT Short Term Goals    STG Date to Achieve  09/26/21  -AB     STG 1  Pt. will decrease pitting edema to 3to4/4 to decrease risk of infection.   -AB     STG 1 Progress  Met  -AB     STG 2  Pt. will reduce overall girth by 5cm to decrease risk of infection.   -AB     STG 2 Progress  Met  -AB     STG 3  Pt. will be more compliant w/ HEP to assist w/ lymphatic flow.   -AB     STG 3 Progress  Partially Met;Ongoing  -AB        Long Term Goals    LTG Date to Achieve  10/28/21  -AB     LTG 1  Pt. will decrease pitting edema to 3/4 to decrease risk of infection.   -AB     LTG 1 Progress  Met;Ongoing  -AB     LTG 2  Pt. will reduce overall girth by 10cm to decrease risk of infection.   -AB     LTG 2 Progress  Partially Met;Ongoing  -AB     LTG 3  Pt. will be independent w/ donning of  reduction kit.   -AB     LTG 3 Progress  Ongoing  -AB        Time Calculation    OT Goal Re-Cert Due Date  11/28/21  -AB       User Key  (r) = Recorded By, (t) = Taken By, (c) = Cosigned By    Initials Name Provider Type    Chely Mckenna, OT Occupational Therapist          Therapy Education  Given: HEP, Edema management, Symptoms/condition management, Bandaging/dressing change  Program: Reinforced  How Provided: Verbal, Demonstration  Provided to: Patient  Level of Understanding: Verbalized                Time Calculation:   OT Start Time: 0940  OT Stop Time: 1120  OT Time Calculation (min): 100 min  OT Non-Billable Time (min): 35 min  Total Timed Code Minutes- OT: 100 minute(s)     Therapy Charges for Today     Code Description Service Date Service Provider Modifiers Qty    22624171836 HC OT MANUAL THERAPY EA 15 MIN 8/26/2021 Chely Sierra, OT GO, KX 4    30141978342 HC OT LYMPHEDEMA MANAGEMENT-15 MIN 8/26/2021 Chely Sierra, OT KX 1    06198823078 HC OT VASOPNEUMAT DEVIC 1 OR MORE AREAS 8/26/2021 Chely Sierra, OT GO, KX 1    21013952139 HC OT SELF CARE/MGMT/TRAIN EA 15 MIN 8/26/2021 Chely Sierra OT GO, KX 1                      Chely Sierra OT  8/26/2021

## 2021-09-02 ENCOUNTER — HOSPITAL ENCOUNTER (OUTPATIENT)
Dept: OCCUPATIONAL THERAPY | Facility: HOSPITAL | Age: 56
Setting detail: THERAPIES SERIES
Discharge: HOME | End: 2021-09-02

## 2021-09-02 DIAGNOSIS — R60.0 BILATERAL LOWER EXTREMITY EDEMA: ICD-10-CM

## 2021-09-02 DIAGNOSIS — E66.9 OBESITY, UNSPECIFIED CLASSIFICATION, UNSPECIFIED OBESITY TYPE, UNSPECIFIED WHETHER SERIOUS COMORBIDITY PRESENT: ICD-10-CM

## 2021-09-02 DIAGNOSIS — K40.90 NON-RECURRENT UNILATERAL INGUINAL HERNIA WITHOUT OBSTRUCTION OR GANGRENE: ICD-10-CM

## 2021-09-02 DIAGNOSIS — I89.0 LYMPHEDEMA: Primary | ICD-10-CM

## 2021-09-02 DIAGNOSIS — Z98.890 H/O VASCULAR SURGERY: ICD-10-CM

## 2021-09-02 PROCEDURE — 97535 SELF CARE MNGMENT TRAINING: CPT

## 2021-09-02 PROCEDURE — 97016 VASOPNEUMATIC DEVICE THERAPY: CPT

## 2021-09-02 PROCEDURE — 97139 UNLISTED THERAPEUTIC PX: CPT

## 2021-09-02 PROCEDURE — 97140 MANUAL THERAPY 1/> REGIONS: CPT

## 2021-09-02 NOTE — THERAPY TREATMENT NOTE
Outpatient Occupational Therapy Lymphedema Treatment Note  CASSANDRA Alonso     Patient Name: El Nielsen  : 1965  MRN: [id number]  Today's Date: 2021      Visit Date: 2021    Patient Active Problem List   Diagnosis   • Non-recurrent unilateral inguinal hernia without obstruction or gangrene        Past Medical History:   Diagnosis Date   • Arthritis    • Elevated cholesterol    • GERD (gastroesophageal reflux disease)    • Hypertension    • Sleep apnea         Past Surgical History:   Procedure Laterality Date   • EYE SURGERY     • MENISCECTOMY Left    • VASCULAR SURGERY           Visit Dx:      ICD-10-CM ICD-9-CM   1. Lymphedema  I89.0 457.1   2. Obesity, unspecified classification, unspecified obesity type, unspecified whether serious comorbidity present  E66.9 278.00   3. H/O vascular surgery  Z98.890 V45.89   4. Bilateral lower extremity edema  R60.0 782.3   5. Non-recurrent unilateral inguinal hernia without obstruction or gangrene  K40.90 550.90       Lymphedema     Row Name 21 1100             Subjective Pain    Able to rate subjective pain?  yes  -AB      Pre-Treatment Pain Level  3  -AB      Subjective Pain Comment  BLE's  -AB         Subjective Comments    Subjective Comments  Pt. presents w/ no new c/o this date. After pumping LLE, pt. c/o pain in lateral aspect of calf area.   -AB         Lymphedema Assessment    Lymphedema Classification  RLE:;LLE:;Trunk:;stage 2 (Spontaneously Irreversible);stage 3 (Lymphostatic Elephantiasis);secondary  -AB      Infections or Cellulitis?  no none currently  -AB         Posture/Observations    Posture- WNL  Posture is WNL  -AB      Observations  Edema  -AB         Lymphedema Edema Assessment    Ptting Edema Category  By grade out of 4;By severity  -AB      Pitting Edema  Moderate;+ 3/4;+ 4/4  -AB      Stemmer Sign  bilateral:;positive  -AB         Skin Changes/Observations    Location/Assessment  Lower Extremity  -AB      Lower Extremity  Conditions  bilateral:;dry;hairless;scaly;crust  -AB      Lower Extremity Color/Pigment  bilateral:;red;blanchable;non-blanchable;hyperpigmented;brawny;woody;fibrosis  -AB         Lymphedema Sensation    Lymphedema Sensation Reports  RLE:;LLE:;numbness;tingling  -AB      Lymphedema Sensation Tests  light touch;deep touch  -AB      Lymphedema Light Touch  absent sensation;LLE:;RLE:  -AB      Lymphedema Deep Touch  RLE:;LLE:;severe impairment RLE more impaired  -AB         Lymphedema Measurements    Measurement Type(s)  Quick Girth;Circumferential  -AB      Quick Girth Areas  Lower extremities  -AB      Circumferential Areas  Trunk  -AB         LLE Quick Girth (cm)    Met-heads  33.8 cm  -AB      Mid foot  34.5 cm  -AB      Smallest ankle  41.1 cm  -AB      Largest calf  56.7 cm  -AB      Tib tuberosity  50.1 cm  -AB      Mid patella  59.8 cm  -AB      Distal thigh  68 cm  -AB      Other 1  40.2 cm  -AB      Other 2  48.2 cm  -AB         RLE Quick Girth (cm)    Met-heads  35 cm  -AB      Mid foot  37.3 cm  -AB      Smallest ankle  43 cm  -AB      Largest calf  63 cm  -AB      Tib tuberosity  54.3 cm  -AB      Mid patella  62.3 cm  -AB      Distal thigh  70.4 cm  -AB      Other 1  43.8 cm  -AB      Other 2  52.6 cm  -AB      RLE Quick Girth Total  461.7  -AB         Trunk Circumferential (cm)    Measurement Location 1  Chest  -AB      Measurement Location 2  Navel  -AB         Manual Lymphatic Drainage    Manual Lymphatic Drainage  extremity treatment  -AB      Extremity Treatment  MLD to full limb  -AB      MLD to Full Limb  BLE's  -AB      Manual Lymphatic Drainage Comments  vibration to BLE's  -AB      Manual Therapy  MLD to BLE's, abdomen, inguinals, axillary  -AB         Compression/Skin Care    Compression/Skin Care  bandaging;skin care;wrapping location  -AB      Skin Care  moisturizing lotion applied  -AB      Wrapping Location  lower extremity  -AB      Wrapping Location LE  bilateral: base of toes to knees   -AB      Bandage Layers  short-stretch bandages (comment size/quantity);cotton elastic stocking- single layer (comment size);soft foam- 1/4 inch  -AB      Bandaging Technique  circumferential/spiral;moderate compression  -AB      Compression/Skin Care Comments  compression pump to abdomen and BLE's  -AB        User Key  (r) = Recorded By, (t) = Taken By, (c) = Cosigned By    Initials Name Provider Type    AB Chely Sierra, OT Occupational Therapist                                Therapy Education  Given: HEP, Edema management, Symptoms/condition management, Bandaging/dressing change  Program: Reinforced  How Provided: Verbal, Demonstration  Provided to: Patient  Level of Understanding: Verbalized                Time Calculation:   OT Start Time: 1010  OT Stop Time: 1150  OT Time Calculation (min): 100 min  OT Non-Billable Time (min): 25 min  Total Timed Code Minutes- OT: 100 minute(s)     Therapy Charges for Today     Code Description Service Date Service Provider Modifiers Qty    71875223288 HC OT MANUAL THERAPY EA 15 MIN 9/2/2021 Chely Sierra OT EFFIE LUNAX 4    82305270178 HC OT LYMPHEDEMA MANAGEMENT-15 MIN 9/2/2021 Chely Sierra OT KX 1    43941573753 HC OT VASOPNEUMAT DEVIC 1 OR MORE AREAS 9/2/2021 Chely Sierra OT EFFIE LUNAX 1    [id number] HC OT SELF CARE/MGMT/TRAIN EA 15 MIN 9/2/2021 Chely Sierra OT GO KX 1                      Chely Sierra OT  9/2/2021

## 2021-09-09 ENCOUNTER — APPOINTMENT (OUTPATIENT)
Dept: OCCUPATIONAL THERAPY | Facility: HOSPITAL | Age: 56
End: 2021-09-09

## 2021-09-16 ENCOUNTER — HOSPITAL ENCOUNTER (OUTPATIENT)
Dept: OCCUPATIONAL THERAPY | Facility: HOSPITAL | Age: 56
Setting detail: THERAPIES SERIES
Discharge: HOME | End: 2021-09-16

## 2021-09-16 DIAGNOSIS — I89.0 LYMPHEDEMA: Primary | ICD-10-CM

## 2021-09-16 DIAGNOSIS — Z98.890 H/O VASCULAR SURGERY: ICD-10-CM

## 2021-09-16 DIAGNOSIS — R60.0 BILATERAL LOWER EXTREMITY EDEMA: ICD-10-CM

## 2021-09-16 DIAGNOSIS — E66.9 OBESITY, UNSPECIFIED CLASSIFICATION, UNSPECIFIED OBESITY TYPE, UNSPECIFIED WHETHER SERIOUS COMORBIDITY PRESENT: ICD-10-CM

## 2021-09-16 PROCEDURE — 97016 VASOPNEUMATIC DEVICE THERAPY: CPT

## 2021-09-16 PROCEDURE — 97535 SELF CARE MNGMENT TRAINING: CPT

## 2021-09-16 PROCEDURE — 97140 MANUAL THERAPY 1/> REGIONS: CPT

## 2021-09-16 PROCEDURE — 97139 UNLISTED THERAPEUTIC PX: CPT

## 2021-09-16 NOTE — THERAPY TREATMENT NOTE
Outpatient Occupational Therapy Lymphedema Treatment Note   [facility]     Patient Name: El Nielsen  : 1965  MRN: [id number]  Today's Date: 2021      Visit Date: 2021    Patient Active Problem List   Diagnosis   • Non-recurrent unilateral inguinal hernia without obstruction or gangrene        Past Medical History:   Diagnosis Date   • Arthritis    • Elevated cholesterol    • GERD (gastroesophageal reflux disease)    • Hypertension    • Sleep apnea         Past Surgical History:   Procedure Laterality Date   • EYE SURGERY     • MENISCECTOMY Left    • VASCULAR SURGERY           Visit Dx:      ICD-10-CM ICD-9-CM   1. Lymphedema  I89.0 457.1   2. Obesity, unspecified classification, unspecified obesity type, unspecified whether serious comorbidity present  E66.9 278.00   3. H/O vascular surgery  Z98.890 V45.89   4. Bilateral lower extremity edema  R60.0 782.3       Lymphedema     Row Name 21 1500             Subjective Pain    Subjective Pain Comment  no c/o pain voiced this date  -AB         Subjective Comments    Subjective Comments  Pt. presents this date reporting that he feels better after having stomach virus last week. Pt. also states that he visitied podiatrist yesterday, and they have given him Farrow Wraps for BLE's w/ 2 sets of compression socks (10-20 mmhg, and 20-30mmhg).   -AB         Lymphedema Assessment    Lymphedema Classification  RLE:;LLE:;Trunk:;stage 2 (Spontaneously Irreversible);stage 3 (Lymphostatic Elephantiasis);secondary  -AB      Infections or Cellulitis?  no none currently  -AB         Posture/Observations    Posture- WNL  Posture is WNL  -AB      Observations  Edema  -AB         Lymphedema Edema Assessment    Ptting Edema Category  By grade out of 4;By severity  -AB      Pitting Edema  Moderate;+ 3/4;+ 4/4  -AB      Stemmer Sign  bilateral:;positive  -AB         Skin Changes/Observations    Location/Assessment  Lower Extremity  -AB      Lower Extremity  Conditions  bilateral:;dry;hairless;scaly;crust  -AB      Lower Extremity Color/Pigment  bilateral:;red;blanchable;non-blanchable;hyperpigmented;brawny;woody;fibrosis  -AB         Lymphedema Sensation    Lymphedema Sensation Reports  RLE:;LLE:;numbness;tingling  -AB      Lymphedema Sensation Tests  light touch;deep touch  -AB      Lymphedema Light Touch  absent sensation;LLE:;RLE:  -AB      Lymphedema Deep Touch  RLE:;LLE:;severe impairment RLE more impaired  -AB         Lymphedema Measurements    Measurement Type(s)  Quick Girth;Circumferential  -AB      Quick Girth Areas  Lower extremities  -AB      Circumferential Areas  Trunk  -AB         LLE Quick Girth (cm)    Met-heads  32.5 cm  -AB      Mid foot  33.8 cm  -AB      Smallest ankle  38.7 cm  -AB      Largest calf  55.9 cm  -AB      Tib tuberosity  50 cm  -AB      Mid patella  62 cm  -AB      Distal thigh  67 cm  -AB      Other 1  39.9 cm  -AB      Other 2  45.2 cm  -AB         RLE Quick Girth (cm)    Met-heads  33.9 cm  -AB      Mid foot  36.6 cm  -AB      Smallest ankle  42.2 cm  -AB      Largest calf  60.2 cm  -AB      Tib tuberosity  51.4 cm  -AB      Mid patella  60.1 cm  -AB      Distal thigh  66.9 cm  -AB      Other 1  43.8 cm  -AB      Other 2  49.2 cm  -AB      RLE Quick Girth Total  444.3  -AB         Trunk Circumferential (cm)    Measurement Location 1  Chest  -AB      Measurement Location 2  Navel  -AB         Manual Lymphatic Drainage    Manual Lymphatic Drainage  extremity treatment  -AB      Extremity Treatment  MLD to full limb  -AB      MLD to Full Limb  BLE's  -AB      Manual Lymphatic Drainage Comments  vibration to BLE's  -AB      Manual Therapy  MLD to BLE's, abdomen, inguinals  -AB         Compression/Skin Care    Compression/Skin Care  bandaging;skin care;wrapping location  -AB      Skin Care  moisturizing lotion applied  -AB      Wrapping Location  lower extremity  -AB      Wrapping Location LE  bilateral: base of toes to knees  -AB       Bandage Layers  short-stretch bandages (comment size/quantity);cotton elastic stocking- single layer (comment size);soft foam- 1/4 inch  -AB      Bandaging Technique  circumferential/spiral;moderate compression  -AB      Compression/Skin Care Comments  compression pump to abdomen and BLE's  -AB        User Key  (r) = Recorded By, (t) = Taken By, (c) = Cosigned By    Initials Name Provider Type    AB Chely Sierra, OT Occupational Therapist                                Therapy Education  Given: HEP, Edema management, Symptoms/condition management, Bandaging/dressing change  Program: Reinforced  How Provided: Verbal, Demonstration  Provided to: Patient  Level of Understanding: Verbalized                Time Calculation:   OT Start Time: 0945  OT Stop Time: 1140  OT Time Calculation (min): 115 min  OT Non-Billable Time (min): 25 min  Total Timed Code Minutes- OT: 115 minute(s)     Therapy Charges for Today     Code Description Service Date Service Provider Modifiers Qty    93621535089 HC OT MANUAL THERAPY EA 15 MIN 9/16/2021 Chely Sierra OT EFFIE LUNAX 4    18006756265 HC OT LYMPHEDEMA MANAGEMENT-15 MIN 9/16/2021 Chely Sierra OT KX 1    62176248184 HC OT VASOPNEUMAT DEVIC 1 OR MORE AREAS 9/16/2021 Chely Sierra OT EFFIE LUNAX 1    35944192853 HC OT SELF CARE/MGMT/TRAIN EA 15 MIN 9/16/2021 Chely Sierra OT GO, KX 2                      Chely Sierra OT  9/16/2021

## 2021-09-30 ENCOUNTER — HOSPITAL ENCOUNTER (OUTPATIENT)
Dept: OCCUPATIONAL THERAPY | Facility: HOSPITAL | Age: 56
Setting detail: THERAPIES SERIES
Discharge: HOME | End: 2021-09-30

## 2021-09-30 DIAGNOSIS — I89.0 LYMPHEDEMA: Primary | ICD-10-CM

## 2021-09-30 DIAGNOSIS — R60.0 BILATERAL LOWER EXTREMITY EDEMA: ICD-10-CM

## 2021-09-30 DIAGNOSIS — Z98.890 H/O VASCULAR SURGERY: ICD-10-CM

## 2021-09-30 DIAGNOSIS — E66.9 OBESITY, UNSPECIFIED CLASSIFICATION, UNSPECIFIED OBESITY TYPE, UNSPECIFIED WHETHER SERIOUS COMORBIDITY PRESENT: ICD-10-CM

## 2021-09-30 PROCEDURE — 97139 UNLISTED THERAPEUTIC PX: CPT

## 2021-09-30 PROCEDURE — 97140 MANUAL THERAPY 1/> REGIONS: CPT

## 2021-09-30 PROCEDURE — 97016 VASOPNEUMATIC DEVICE THERAPY: CPT

## 2023-06-21 NOTE — THERAPY PROGRESS REPORT/RE-CERT
"Outpatient Occupational Therapy Lymphedema Progress Note   [place]     Patient Name: El Nielsen  : 1965  MRN: [id number]  Today's Date: 2020      Visit Date: 2020    There is no problem list on file for this patient.       Past Medical History:   Diagnosis Date   • Arthritis    • Elevated cholesterol    • GERD (gastroesophageal reflux disease)    • Hypertension    • Sleep apnea         Past Surgical History:   Procedure Laterality Date   • EYE SURGERY     • VASCULAR SURGERY           Visit Dx:      ICD-10-CM ICD-9-CM   1. Bilateral lower extremity edema R60.0 782.3   2. H/O vascular surgery Z98.890 V45.89   3. Lymphedema I89.0 457.1       Lymphedema     Row Name 20 0800             Subjective Pain    Able to rate subjective pain?  yes  -AB      Pre-Treatment Pain Level  5  -AB      Subjective Pain Comment  BLE's  -AB         Subjective Comments    Subjective Comments  pt. reports that he has been having severe shooting pains \"up and down both legs\". Pt. also states that he has been performing exercises and pumping daily. He also presented to treatment w/ kinesiotape still donned from last visit.    -AB         Lymphedema Assessment    Lymphedema Classification  RLE:;LLE:;stage 2 (Spontaneously Irreversible);primary abdomen  -AB      Infections or Cellulitis?  no  -AB         Posture/Observations    Posture- WNL  Posture is WNL  -AB         Lymphedema Edema Assessment    Ptting Edema Category  By severity  -AB      Pitting Edema  Severe  -AB      Edema Assessment Comment  decreased swelling in BLE's  -AB         Skin Changes/Observations    Location/Assessment  Lower Extremity  -AB      Lower Extremity Conditions  bilateral:;dry;hairless;scaly;fragile  -AB      Lower Extremity Color/Pigment  bilateral:;red;blanchable;hyperpigmented;brawny;fibrosis  -AB      Skin Observations Comment  weeping on LLE from a scratch  -AB         Lymphedema Sensation    Lymphedema Sensation Reports  " RLE:;LLE:;numbness;tingling  -AB      Lymphedema Sensation Tests  light touch  -AB      Lymphedema Light Touch  RLE:;LLE:;severe impairment  -AB         Lymphedema Measurements    Measurement Type(s)  Quick Girth  -AB      Quick Girth Areas  Lower extremities  -AB         LLE Quick Girth (cm)    Met-heads  32.5 cm  -AB      Mid foot  33.5 cm  -AB      Smallest ankle  40.3 cm  -AB      Largest calf  59 cm  -AB      Tib tuberosity  52 cm  -AB      Mid patella  58.4 cm  -AB      Distal thigh  68 cm  -AB      Other 1  41.7 cm  -AB      Other 2  49.7 cm  -AB         RLE Quick Girth (cm)    Met-heads  32.8 cm  -AB      Mid foot  37.1 cm  -AB      Smallest ankle  41.6 cm  -AB      Largest calf  63.7 cm  -AB      Tib tuberosity  53.6 cm  -AB      Mid patella  57.9 cm  -AB      Distal thigh  66.4 cm  -AB      Other 1  44.6 cm  -AB      Other 2  53 cm  -AB      RLE Quick Girth Total  450.7  -AB         Manual Lymphatic Drainage    Manual Lymphatic Drainage  initial sequence;extremity treatment;opened regional lymph nodes  -AB      Initial Sequence  abdomen  -AB      Opened Regional Lymph Nodes  axillary;inguinal  -AB      Axillary  right;left  -AB      Inguinal  right;left  -AB      Extremity Treatment  MLD to full limb  -AB      MLD to Full Limb  BLE's  -AB      Manual Therapy  MLD to BLE's, abdomen, axillary, inguinals  -AB         Compression/Skin Care    Compression/Skin Care  skin care;wrapping location;bandaging  -AB      Skin Care  washed/dried;moisturizing lotion applied  -AB      Wrapping Location  lower extremity  -AB      Wrapping Location LE  bilateral: base of toes to below knee per pt. request  -AB      Bandage Layers  cotton elastic stocking- single layer (comment size);soft foam- 1/4 inch;short-stretch bandages (comment size/quantity)  -AB      Bandaging Technique  circumferential/spiral;moderate compression  -AB      Compression/Skin Care Comments  compression pump to abdomen and BLE's  -AB        User Key   (r) = Recorded By, (t) = Taken By, (c) = Cosigned By    Initials Name Provider Type    AB RigoChely, OT Occupational Therapist                  OT Assessment/Plan     Row Name 08/26/20 1732          OT Assessment    OT Rehab Potential  Good  -AB     Patient/caregiver participated in establishment of treatment plan and goals  Yes  -AB     Patient would benefit from skilled therapy intervention  Yes  -AB        OT Plan    OT Frequency  3x/week  -AB     OT Plan Comments  Pt. continues to progress towards goal achievement, meeting 2 STG's. He will still require aggressive skilled OT services to address ongoing lymphedema needs.   -AB       User Key  (r) = Recorded By, (t) = Taken By, (c) = Cosigned By    Initials Name Provider Type    AB SierraChely, OT Occupational Therapist                OT Goals     Row Name 08/26/20 1700          OT Short Term Goals    STG Date to Achieve  08/16/20  -AB     STG 1  Pt./caregiver will be familiar with precautions, skin care, and self management of lymphedema.   -AB     STG 1 Progress  Partially Met  -AB     STG 2  Pt. will decrease overall edema to moderate-severe to decrease risk of infection.   -AB     STG 2 Progress  Ongoing  -AB     STG 3  Pt. will reduce overall girth by 5cm to reduce risk of infection.   -AB     STG 3 Progress  Met  -AB     STG 4  Pt. will be familiar with HEP to assist with improved lymphatic flow.   -AB     STG 4 Progress  Partially Met;Progressing  -AB     STG 5  Pt./caregiver will be familiar with self care instructions for home MLD for volume reduction.   -AB     STG 5 Progress  Partially Met;Progressing  -AB        Long Term Goals    LTG Date to Achieve  09/16/20  -AB     LTG 1  Pt./caregiver will be independent with short stretch compression bandaging for volume reduction  -AB     LTG 1 Progress  Ongoing  -AB     LTG 2  Pt. will decrease edema to moderate for decreased risk of infection.   -AB     LTG 2 Progress  Ongoing  -AB      LTG 3  Pt. will reduce overall girth by 10cm to reduce risk of infection.   -AB     LTG 3 Progress  Ongoing  -AB     LTG 4  Pt./caregiver will be independent with donning/doffing compression garment.   -AB     LTG 4 Progress  Ongoing  -AB     LTG 5  Pt./caregiver will be independent with lymphedema management and HEP.   -AB     LTG 5 Progress  Ongoing;Progressing  -AB       User Key  (r) = Recorded By, (t) = Taken By, (c) = Cosigned By    Initials Name Provider Type    Chely Mckenna OT Occupational Therapist          Therapy Education  Given: HEP, Symptoms/condition management, Edema management, Bandaging/dressing change  Program: Reinforced  How Provided: Verbal, Demonstration  Provided to: Patient  Level of Understanding: Verbalized                Time Calculation:   OT Start Time: 0825  OT Stop Time: 1000  OT Time Calculation (min): 95 min  OT Non-Billable Time (min): 25 min  Total Timed Code Minutes- OT: 95 minute(s)     Therapy Charges for Today     Code Description Service Date Service Provider Modifiers Qty    47831987665 HC OT VASOPNEUMAT DEVIC 1 OR MORE AREAS 8/26/2020 Chely Sierra OT JEREMY, KX 1    57861298975 HC OT MANUAL THERAPY EA 15 MIN 8/26/2020 Chely Sierra OT GO, KX 4    53281808724 HC OT LYMPHEDEMA MANAGEMENT-15 MIN 8/26/2020 Chely Sierra OT KX 1                      Chely Sierra OT  8/26/2020   139.9

## 2023-12-18 ENCOUNTER — APPOINTMENT (OUTPATIENT)
Dept: ULTRASOUND IMAGING | Facility: HOSPITAL | Age: 58
End: 2023-12-18
Payer: MEDICARE

## 2023-12-18 ENCOUNTER — HOSPITAL ENCOUNTER (EMERGENCY)
Facility: HOSPITAL | Age: 58
Discharge: HOME | End: 2023-12-18
Attending: EMERGENCY MEDICINE
Payer: MEDICARE

## 2023-12-18 VITALS
OXYGEN SATURATION: 99 % | DIASTOLIC BLOOD PRESSURE: 77 MMHG | SYSTOLIC BLOOD PRESSURE: 153 MMHG | TEMPERATURE: 97.5 F | WEIGHT: 315 LBS | BODY MASS INDEX: 37.19 KG/M2 | HEART RATE: 69 BPM | HEIGHT: 77 IN | RESPIRATION RATE: 19 BRPM

## 2023-12-18 DIAGNOSIS — K40.90 UNILATERAL INGUINAL HERNIA WITHOUT OBSTRUCTION OR GANGRENE, RECURRENCE NOT SPECIFIED: Primary | ICD-10-CM

## 2023-12-18 LAB
ALBUMIN SERPL-MCNC: 3.6 G/DL (ref 3.5–5.2)
ALBUMIN/GLOB SERPL: 0.9 G/DL
ALP SERPL-CCNC: 74 U/L (ref 39–117)
ALT SERPL W P-5'-P-CCNC: 14 U/L (ref 1–41)
ANION GAP SERPL CALCULATED.3IONS-SCNC: 11.6 MMOL/L (ref 5–15)
AST SERPL-CCNC: 15 U/L (ref 1–40)
BACTERIA UR QL AUTO: ABNORMAL /HPF
BASOPHILS # BLD AUTO: 0.06 10*3/MM3 (ref 0–0.2)
BASOPHILS NFR BLD AUTO: 0.9 % (ref 0–1.5)
BILIRUB SERPL-MCNC: 0.5 MG/DL (ref 0–1.2)
BILIRUB UR QL STRIP: NEGATIVE
BUN SERPL-MCNC: 10 MG/DL (ref 6–20)
BUN/CREAT SERPL: 10.9 (ref 7–25)
CALCIUM SPEC-SCNC: 9.1 MG/DL (ref 8.6–10.5)
CHLORIDE SERPL-SCNC: 98 MMOL/L (ref 98–107)
CLARITY UR: ABNORMAL
CO2 SERPL-SCNC: 24.4 MMOL/L (ref 22–29)
COLOR UR: YELLOW
CREAT SERPL-MCNC: 0.92 MG/DL (ref 0.76–1.27)
DEPRECATED RDW RBC AUTO: 48.2 FL (ref 37–54)
EGFRCR SERPLBLD CKD-EPI 2021: 96.4 ML/MIN/1.73
EOSINOPHIL # BLD AUTO: 0.17 10*3/MM3 (ref 0–0.4)
EOSINOPHIL NFR BLD AUTO: 2.6 % (ref 0.3–6.2)
ERYTHROCYTE [DISTWIDTH] IN BLOOD BY AUTOMATED COUNT: 14.8 % (ref 12.3–15.4)
GLOBULIN UR ELPH-MCNC: 4.1 GM/DL
GLUCOSE SERPL-MCNC: 139 MG/DL (ref 65–99)
GLUCOSE UR STRIP-MCNC: NEGATIVE MG/DL
HCT VFR BLD AUTO: 39.5 % (ref 37.5–51)
HGB BLD-MCNC: 12.4 G/DL (ref 13–17.7)
HGB UR QL STRIP.AUTO: NEGATIVE
HYALINE CASTS UR QL AUTO: ABNORMAL /LPF
IMM GRANULOCYTES # BLD AUTO: 0.06 10*3/MM3 (ref 0–0.05)
IMM GRANULOCYTES NFR BLD AUTO: 0.9 % (ref 0–0.5)
KETONES UR QL STRIP: NEGATIVE
LEUKOCYTE ESTERASE UR QL STRIP.AUTO: ABNORMAL
LYMPHOCYTES # BLD AUTO: 1.26 10*3/MM3 (ref 0.7–3.1)
LYMPHOCYTES NFR BLD AUTO: 19.4 % (ref 19.6–45.3)
MCH RBC QN AUTO: 28 PG (ref 26.6–33)
MCHC RBC AUTO-ENTMCNC: 31.4 G/DL (ref 31.5–35.7)
MCV RBC AUTO: 89.2 FL (ref 79–97)
MONOCYTES # BLD AUTO: 0.66 10*3/MM3 (ref 0.1–0.9)
MONOCYTES NFR BLD AUTO: 10.1 % (ref 5–12)
NEUTROPHILS NFR BLD AUTO: 4.3 10*3/MM3 (ref 1.7–7)
NEUTROPHILS NFR BLD AUTO: 66.1 % (ref 42.7–76)
NITRITE UR QL STRIP: NEGATIVE
NRBC BLD AUTO-RTO: 0 /100 WBC (ref 0–0.2)
PH UR STRIP.AUTO: 6 [PH] (ref 5–8)
PLATELET # BLD AUTO: 209 10*3/MM3 (ref 140–450)
PMV BLD AUTO: 8.8 FL (ref 6–12)
POTASSIUM SERPL-SCNC: 4 MMOL/L (ref 3.5–5.2)
PROT SERPL-MCNC: 7.7 G/DL (ref 6–8.5)
PROT UR QL STRIP: ABNORMAL
RBC # BLD AUTO: 4.43 10*6/MM3 (ref 4.14–5.8)
RBC # UR STRIP: ABNORMAL /HPF
REF LAB TEST METHOD: ABNORMAL
SODIUM SERPL-SCNC: 134 MMOL/L (ref 136–145)
SP GR UR STRIP: 1.01 (ref 1–1.03)
SQUAMOUS #/AREA URNS HPF: ABNORMAL /HPF
UROBILINOGEN UR QL STRIP: ABNORMAL
WBC # UR STRIP: ABNORMAL /HPF
WBC NRBC COR # BLD AUTO: 6.51 10*3/MM3 (ref 3.4–10.8)

## 2023-12-18 PROCEDURE — 76870 US EXAM SCROTUM: CPT | Performed by: RADIOLOGY

## 2023-12-18 PROCEDURE — 80053 COMPREHEN METABOLIC PANEL: CPT | Performed by: NURSE PRACTITIONER

## 2023-12-18 PROCEDURE — 36415 COLL VENOUS BLD VENIPUNCTURE: CPT

## 2023-12-18 PROCEDURE — 87086 URINE CULTURE/COLONY COUNT: CPT | Performed by: NURSE PRACTITIONER

## 2023-12-18 PROCEDURE — 76870 US EXAM SCROTUM: CPT

## 2023-12-18 PROCEDURE — 99284 EMERGENCY DEPT VISIT MOD MDM: CPT

## 2023-12-18 PROCEDURE — 81001 URINALYSIS AUTO W/SCOPE: CPT | Performed by: NURSE PRACTITIONER

## 2023-12-18 PROCEDURE — 85025 COMPLETE CBC W/AUTO DIFF WBC: CPT | Performed by: NURSE PRACTITIONER

## 2023-12-18 RX ORDER — HYDROCODONE BITARTRATE AND ACETAMINOPHEN 10; 325 MG/1; MG/1
1 TABLET ORAL EVERY 4 HOURS PRN
Qty: 15 TABLET | Refills: 0 | Status: SHIPPED | OUTPATIENT
Start: 2023-12-18

## 2023-12-18 NOTE — ED PROVIDER NOTES
Subjective   History of Present Illness  Patient is a 50-year-old male presents emergency room today complaint of groin swelling.  Patient reports that he started having swelling in the left testicle about a week ago.  Patient reports that extends both sides.  Patient denies dysuria.  Patient denies fever.  Patient denies any changes in bowel or bowel patterns.  Patient reports he was seen by his primary care provider and given a prescription of doxycycline which he is taken for 5 days without improvement.    Groin Pain      Review of Systems   Constitutional: Negative.    HENT: Negative.     Eyes: Negative.    Respiratory: Negative.     Cardiovascular: Negative.    Gastrointestinal: Negative.    Endocrine: Negative.    Genitourinary: Negative.    Musculoskeletal: Negative.    Skin: Negative.    Allergic/Immunologic: Negative.    Neurological: Negative.    Hematological: Negative.    Psychiatric/Behavioral: Negative.         Past Medical History:   Diagnosis Date    Arthritis     Elevated cholesterol     GERD (gastroesophageal reflux disease)     Hypertension     Sleep apnea        Allergies   Allergen Reactions    Penicillins Rash       Past Surgical History:   Procedure Laterality Date    EYE SURGERY      MENISCECTOMY Left     VASCULAR SURGERY         Family History   Problem Relation Age of Onset    Arthritis Mother     COPD Mother     Hypertension Father     Arthritis Maternal Grandmother        Social History     Socioeconomic History    Marital status:    Tobacco Use    Smoking status: Every Day     Packs/day: .5     Types: Cigarettes    Smokeless tobacco: Never   Substance and Sexual Activity    Alcohol use: Defer    Drug use: No    Sexual activity: Defer           Objective   Physical Exam  Vitals and nursing note reviewed.   Constitutional:       Appearance: He is well-developed.   HENT:      Head: Normocephalic.      Right Ear: External ear normal.      Left Ear: External ear normal.   Eyes:       Conjunctiva/sclera: Conjunctivae normal.      Pupils: Pupils are equal, round, and reactive to light.   Cardiovascular:      Rate and Rhythm: Normal rate and regular rhythm.      Heart sounds: Normal heart sounds.   Pulmonary:      Effort: Pulmonary effort is normal.      Breath sounds: Normal breath sounds.   Abdominal:      General: Bowel sounds are normal.      Palpations: Abdomen is soft.   Genitourinary:     Testes:         Left: Tenderness and swelling present.   Musculoskeletal:         General: Normal range of motion.      Cervical back: Normal range of motion and neck supple.   Skin:     General: Skin is warm and dry.      Capillary Refill: Capillary refill takes less than 2 seconds.   Neurological:      Mental Status: He is alert and oriented to person, place, and time.   Psychiatric:         Behavior: Behavior normal.         Thought Content: Thought content normal.         Procedures           ED Course                                             Medical Decision Making  Amount and/or Complexity of Data Reviewed  Labs: ordered.  Radiology: ordered.        Final diagnoses:   None       ED Disposition  ED Disposition       None            No follow-up provider specified.       Medication List      No changes were made to your prescriptions during this visit.          Moderate Pain.               Where to Get Your Medications        These medications were sent to [facility] - [place] - [address] - [phone]  - [phone]   [address] [place] [address]      Phone: [phone]   HYDROcodone-acetaminophen  MG per tablet            Sai Ortiz, APRN  12/27/23 1313

## 2023-12-19 LAB — BACTERIA SPEC AEROBE CULT: NO GROWTH

## 2023-12-21 ENCOUNTER — OFFICE VISIT (OUTPATIENT)
Dept: UROLOGY | Facility: CLINIC | Age: 58
End: 2023-12-21
Payer: MEDICARE

## 2023-12-21 ENCOUNTER — OFFICE VISIT (OUTPATIENT)
Dept: SURGERY | Facility: CLINIC | Age: 58
End: 2023-12-21
Payer: MEDICARE

## 2023-12-21 ENCOUNTER — APPOINTMENT (OUTPATIENT)
Dept: CT IMAGING | Facility: HOSPITAL | Age: 58
End: 2023-12-21
Payer: MEDICARE

## 2023-12-21 ENCOUNTER — TELEPHONE (OUTPATIENT)
Dept: SURGERY | Facility: CLINIC | Age: 58
End: 2023-12-21

## 2023-12-21 ENCOUNTER — HOSPITAL ENCOUNTER (EMERGENCY)
Facility: HOSPITAL | Age: 58
Discharge: HOME | End: 2023-12-21
Attending: STUDENT IN AN ORGANIZED HEALTH CARE EDUCATION/TRAINING PROGRAM
Payer: MEDICARE

## 2023-12-21 VITALS
TEMPERATURE: 98.5 F | HEIGHT: 77 IN | OXYGEN SATURATION: 97 % | SYSTOLIC BLOOD PRESSURE: 168 MMHG | BODY MASS INDEX: 37.19 KG/M2 | DIASTOLIC BLOOD PRESSURE: 92 MMHG | RESPIRATION RATE: 17 BRPM | WEIGHT: 315 LBS | HEART RATE: 90 BPM

## 2023-12-21 VITALS
HEART RATE: 98 BPM | SYSTOLIC BLOOD PRESSURE: 156 MMHG | DIASTOLIC BLOOD PRESSURE: 90 MMHG | HEIGHT: 77 IN | BODY MASS INDEX: 37.19 KG/M2 | WEIGHT: 315 LBS

## 2023-12-21 VITALS
BODY MASS INDEX: 37.19 KG/M2 | HEIGHT: 77 IN | SYSTOLIC BLOOD PRESSURE: 158 MMHG | HEART RATE: 72 BPM | WEIGHT: 315 LBS | DIASTOLIC BLOOD PRESSURE: 86 MMHG

## 2023-12-21 DIAGNOSIS — N50.89 SCROTAL SWELLING: Primary | ICD-10-CM

## 2023-12-21 DIAGNOSIS — K45.8 RECURRENT ABDOMINAL HERNIA WITHOUT OBSTRUCTION OR GANGRENE, UNSPECIFIED HERNIA TYPE: Primary | ICD-10-CM

## 2023-12-21 DIAGNOSIS — K40.90 NON-RECURRENT UNILATERAL INGUINAL HERNIA WITHOUT OBSTRUCTION OR GANGRENE: Primary | ICD-10-CM

## 2023-12-21 LAB
ALBUMIN SERPL-MCNC: 3.8 G/DL (ref 3.5–5.2)
ALBUMIN/GLOB SERPL: 0.9 G/DL
ALP SERPL-CCNC: 80 U/L (ref 39–117)
ALT SERPL W P-5'-P-CCNC: 12 U/L (ref 1–41)
ANION GAP SERPL CALCULATED.3IONS-SCNC: 10.5 MMOL/L (ref 5–15)
APTT PPP: 29 SECONDS (ref 26.5–34.5)
AST SERPL-CCNC: 12 U/L (ref 1–40)
BASOPHILS # BLD AUTO: 0.07 10*3/MM3 (ref 0–0.2)
BASOPHILS NFR BLD AUTO: 0.9 % (ref 0–1.5)
BILIRUB SERPL-MCNC: 0.5 MG/DL (ref 0–1.2)
BUN SERPL-MCNC: 10 MG/DL (ref 6–20)
BUN/CREAT SERPL: 9.9 (ref 7–25)
CALCIUM SPEC-SCNC: 9.3 MG/DL (ref 8.6–10.5)
CHLORIDE SERPL-SCNC: 98 MMOL/L (ref 98–107)
CO2 SERPL-SCNC: 26.5 MMOL/L (ref 22–29)
CREAT SERPL-MCNC: 1.01 MG/DL (ref 0.76–1.27)
D-LACTATE SERPL-SCNC: 1.6 MMOL/L (ref 0.5–2)
DEPRECATED RDW RBC AUTO: 47.8 FL (ref 37–54)
EGFRCR SERPLBLD CKD-EPI 2021: 86.2 ML/MIN/1.73
EOSINOPHIL # BLD AUTO: 0.15 10*3/MM3 (ref 0–0.4)
EOSINOPHIL NFR BLD AUTO: 2 % (ref 0.3–6.2)
ERYTHROCYTE [DISTWIDTH] IN BLOOD BY AUTOMATED COUNT: 14.9 % (ref 12.3–15.4)
GLOBULIN UR ELPH-MCNC: 4.4 GM/DL
GLUCOSE SERPL-MCNC: 154 MG/DL (ref 65–99)
HCT VFR BLD AUTO: 41.3 % (ref 37.5–51)
HGB BLD-MCNC: 12.9 G/DL (ref 13–17.7)
HOLD SPECIMEN: NORMAL
HOLD SPECIMEN: NORMAL
IMM GRANULOCYTES # BLD AUTO: 0.11 10*3/MM3 (ref 0–0.05)
IMM GRANULOCYTES NFR BLD AUTO: 1.4 % (ref 0–0.5)
INR PPP: 1.09 (ref 0.9–1.1)
LYMPHOCYTES # BLD AUTO: 1.68 10*3/MM3 (ref 0.7–3.1)
LYMPHOCYTES NFR BLD AUTO: 21.9 % (ref 19.6–45.3)
MCH RBC QN AUTO: 27.6 PG (ref 26.6–33)
MCHC RBC AUTO-ENTMCNC: 31.2 G/DL (ref 31.5–35.7)
MCV RBC AUTO: 88.4 FL (ref 79–97)
MONOCYTES # BLD AUTO: 0.62 10*3/MM3 (ref 0.1–0.9)
MONOCYTES NFR BLD AUTO: 8.1 % (ref 5–12)
NEUTROPHILS NFR BLD AUTO: 5.03 10*3/MM3 (ref 1.7–7)
NEUTROPHILS NFR BLD AUTO: 65.7 % (ref 42.7–76)
NRBC BLD AUTO-RTO: 0 /100 WBC (ref 0–0.2)
PLATELET # BLD AUTO: 249 10*3/MM3 (ref 140–450)
PMV BLD AUTO: 9.1 FL (ref 6–12)
POTASSIUM SERPL-SCNC: 4.2 MMOL/L (ref 3.5–5.2)
PROT SERPL-MCNC: 8.2 G/DL (ref 6–8.5)
PROTHROMBIN TIME: 14.6 SECONDS (ref 12.1–14.7)
RBC # BLD AUTO: 4.67 10*6/MM3 (ref 4.14–5.8)
SODIUM SERPL-SCNC: 135 MMOL/L (ref 136–145)
WBC NRBC COR # BLD AUTO: 7.66 10*3/MM3 (ref 3.4–10.8)
WHOLE BLOOD HOLD COAG: NORMAL
WHOLE BLOOD HOLD SPECIMEN: NORMAL

## 2023-12-21 PROCEDURE — 1159F MED LIST DOCD IN RCRD: CPT | Performed by: UROLOGY

## 2023-12-21 PROCEDURE — 99285 EMERGENCY DEPT VISIT HI MDM: CPT

## 2023-12-21 PROCEDURE — 85025 COMPLETE CBC W/AUTO DIFF WBC: CPT | Performed by: STUDENT IN AN ORGANIZED HEALTH CARE EDUCATION/TRAINING PROGRAM

## 2023-12-21 PROCEDURE — 83605 ASSAY OF LACTIC ACID: CPT | Performed by: STUDENT IN AN ORGANIZED HEALTH CARE EDUCATION/TRAINING PROGRAM

## 2023-12-21 PROCEDURE — 85610 PROTHROMBIN TIME: CPT | Performed by: STUDENT IN AN ORGANIZED HEALTH CARE EDUCATION/TRAINING PROGRAM

## 2023-12-21 PROCEDURE — 74177 CT ABD & PELVIS W/CONTRAST: CPT

## 2023-12-21 PROCEDURE — 99203 OFFICE O/P NEW LOW 30 MIN: CPT | Performed by: UROLOGY

## 2023-12-21 PROCEDURE — 80053 COMPREHEN METABOLIC PANEL: CPT | Performed by: STUDENT IN AN ORGANIZED HEALTH CARE EDUCATION/TRAINING PROGRAM

## 2023-12-21 PROCEDURE — 1160F RVW MEDS BY RX/DR IN RCRD: CPT | Performed by: UROLOGY

## 2023-12-21 PROCEDURE — 74177 CT ABD & PELVIS W/CONTRAST: CPT | Performed by: RADIOLOGY

## 2023-12-21 PROCEDURE — 36415 COLL VENOUS BLD VENIPUNCTURE: CPT

## 2023-12-21 PROCEDURE — 25510000001 IOPAMIDOL 61 % SOLUTION: Performed by: STUDENT IN AN ORGANIZED HEALTH CARE EDUCATION/TRAINING PROGRAM

## 2023-12-21 PROCEDURE — 85730 THROMBOPLASTIN TIME PARTIAL: CPT | Performed by: STUDENT IN AN ORGANIZED HEALTH CARE EDUCATION/TRAINING PROGRAM

## 2023-12-21 RX ORDER — LEVOFLOXACIN 500 MG/1
750 TABLET, FILM COATED ORAL DAILY
Qty: 10 TABLET | Refills: 0 | Status: SHIPPED | OUTPATIENT
Start: 2023-12-21 | End: 2023-12-31

## 2023-12-21 RX ADMIN — IOPAMIDOL 86 ML: 612 INJECTION, SOLUTION INTRAVENOUS at 13:18

## 2023-12-21 NOTE — PROGRESS NOTES
Chief Complaint:      Chief Complaint   Patient presents with    Testicle Pain       HPI:   58 y.o. male referred emergently with a massive scrotum.  He has a hernia confirmed by CT is 441 pounds the question was whether I feel the testicle is compromised I reviewed all films and examination I do not this is a large hernia he will see Dr. Rose for surgery    Past Medical History:     Past Medical History:   Diagnosis Date    Arthritis     Elevated cholesterol     GERD (gastroesophageal reflux disease)     Hypertension     Sleep apnea        Current Meds:     No current facility-administered medications for this visit.     Current Outpatient Medications   Medication Sig Dispense Refill    amLODIPine (NORVASC) 5 MG tablet Take 1 tablet by mouth Daily.      atorvastatin (LIPITOR) 20 MG tablet Take 1 tablet by mouth Daily.      HYDROcodone-acetaminophen (NORCO)  MG per tablet Take 1 tablet by mouth Every 4 (Four) Hours As Needed for Moderate Pain. 15 tablet 0    ketoconazole (NIZORAL) 2 % cream Apply  topically to the appropriate area as directed Daily.      lisinopril-hydrochlorothiazide (PRINZIDE,ZESTORETIC) 20-12.5 MG per tablet Take 1 tablet by mouth Daily.          Allergies:      Allergies   Allergen Reactions    Penicillins Rash        Past Surgical History:     Past Surgical History:   Procedure Laterality Date    EYE SURGERY      MENISCECTOMY Left     VASCULAR SURGERY         Social History:     Social History     Socioeconomic History    Marital status:    Tobacco Use    Smoking status: Former     Packs/day: .5     Types: Cigarettes     Quit date:      Years since quittin.9    Smokeless tobacco: Never   Vaping Use    Vaping Use: Former    Quit date: 2023   Substance and Sexual Activity    Alcohol use: Defer    Drug use: No    Sexual activity: Defer       Family History:     Family History   Problem Relation Age of Onset    Arthritis Mother     COPD Mother     Hypertension Father      Arthritis Maternal Grandmother        Review of Systems:     Review of Systems   Constitutional: Negative.    HENT: Negative.     Eyes: Negative.    Respiratory: Negative.     Cardiovascular: Negative.    Gastrointestinal: Negative.    Endocrine: Negative.    Musculoskeletal: Negative.    Allergic/Immunologic: Negative.    Neurological: Negative.    Hematological: Negative.    Psychiatric/Behavioral: Negative.         Physical Exam:     Physical Exam  Vitals and nursing note reviewed.   Constitutional:       Appearance: He is well-developed.   HENT:      Head: Normocephalic and atraumatic.   Eyes:      Conjunctiva/sclera: Conjunctivae normal.      Pupils: Pupils are equal, round, and reactive to light.   Cardiovascular:      Rate and Rhythm: Normal rate and regular rhythm.      Heart sounds: Normal heart sounds.   Pulmonary:      Effort: Pulmonary effort is normal.      Breath sounds: Normal breath sounds.   Abdominal:      General: Bowel sounds are normal.      Palpations: Abdomen is soft.   Genitourinary:     Comments: Hydrocele  Musculoskeletal:         General: Normal range of motion.      Cervical back: Normal range of motion.   Skin:     General: Skin is warm and dry.   Neurological:      Mental Status: He is alert and oriented to person, place, and time.      Deep Tendon Reflexes: Reflexes are normal and symmetric.   Psychiatric:         Behavior: Behavior normal.         Thought Content: Thought content normal.         Judgment: Judgment normal.         I have reviewed the following portions of the patient's history: Allergies, current medications, past family history, past medical history, past social history, past surgical history, problem list, and ROS and confirm it is accurate.    Recent Image (CT and/or KUB):      CT Abdomen and Pelvis: No results found for this or any previous visit.       CT Stone Protocol: No results found for this or any previous visit.       KUB: No results found for this or  any previous visit.       Labs (past 3 months):      Admission on 12/21/2023   Component Date Value Ref Range Status    Glucose 12/21/2023 154 (H)  65 - 99 mg/dL Final    BUN 12/21/2023 10  6 - 20 mg/dL Final    Creatinine 12/21/2023 1.01  0.76 - 1.27 mg/dL Final    Sodium 12/21/2023 135 (L)  136 - 145 mmol/L Final    Potassium 12/21/2023 4.2  3.5 - 5.2 mmol/L Final    Chloride 12/21/2023 98  98 - 107 mmol/L Final    CO2 12/21/2023 26.5  22.0 - 29.0 mmol/L Final    Calcium 12/21/2023 9.3  8.6 - 10.5 mg/dL Final    Total Protein 12/21/2023 8.2  6.0 - 8.5 g/dL Final    Albumin 12/21/2023 3.8  3.5 - 5.2 g/dL Final    ALT (SGPT) 12/21/2023 12  1 - 41 U/L Final    AST (SGOT) 12/21/2023 12  1 - 40 U/L Final    Alkaline Phosphatase 12/21/2023 80  39 - 117 U/L Final    Total Bilirubin 12/21/2023 0.5  0.0 - 1.2 mg/dL Final    Globulin 12/21/2023 4.4  gm/dL Final    A/G Ratio 12/21/2023 0.9  g/dL Final    BUN/Creatinine Ratio 12/21/2023 9.9  7.0 - 25.0 Final    Anion Gap 12/21/2023 10.5  5.0 - 15.0 mmol/L Final    eGFR 12/21/2023 86.2  >60.0 mL/min/1.73 Final    PTT 12/21/2023 29.0  26.5 - 34.5 seconds Final    Protime 12/21/2023 14.6  12.1 - 14.7 Seconds Final    INR 12/21/2023 1.09  0.90 - 1.10 Final    Lactate 12/21/2023 1.6  0.5 - 2.0 mmol/L Final    WBC 12/21/2023 7.66  3.40 - 10.80 10*3/mm3 Final    RBC 12/21/2023 4.67  4.14 - 5.80 10*6/mm3 Final    Hemoglobin 12/21/2023 12.9 (L)  13.0 - 17.7 g/dL Final    Hematocrit 12/21/2023 41.3  37.5 - 51.0 % Final    MCV 12/21/2023 88.4  79.0 - 97.0 fL Final    MCH 12/21/2023 27.6  26.6 - 33.0 pg Final    MCHC 12/21/2023 31.2 (L)  31.5 - 35.7 g/dL Final    RDW 12/21/2023 14.9  12.3 - 15.4 % Final    RDW-SD 12/21/2023 47.8  37.0 - 54.0 fl Final    MPV 12/21/2023 9.1  6.0 - 12.0 fL Final    Platelets 12/21/2023 249  140 - 450 10*3/mm3 Final    Neutrophil % 12/21/2023 65.7  42.7 - 76.0 % Final    Lymphocyte % 12/21/2023 21.9  19.6 - 45.3 % Final    Monocyte % 12/21/2023 8.1  5.0 -  12.0 % Final    Eosinophil % 12/21/2023 2.0  0.3 - 6.2 % Final    Basophil % 12/21/2023 0.9  0.0 - 1.5 % Final    Immature Grans % 12/21/2023 1.4 (H)  0.0 - 0.5 % Final    Neutrophils, Absolute 12/21/2023 5.03  1.70 - 7.00 10*3/mm3 Final    Lymphocytes, Absolute 12/21/2023 1.68  0.70 - 3.10 10*3/mm3 Final    Monocytes, Absolute 12/21/2023 0.62  0.10 - 0.90 10*3/mm3 Final    Eosinophils, Absolute 12/21/2023 0.15  0.00 - 0.40 10*3/mm3 Final    Basophils, Absolute 12/21/2023 0.07  0.00 - 0.20 10*3/mm3 Final    Immature Grans, Absolute 12/21/2023 0.11 (H)  0.00 - 0.05 10*3/mm3 Final    nRBC 12/21/2023 0.0  0.0 - 0.2 /100 WBC Final    Extra Tube 12/21/2023 Hold for add-ons.   Final    Auto resulted.    Extra Tube 12/21/2023 hold for add-on   Final    Auto resulted    Extra Tube 12/21/2023 Hold for add-ons.   Final    Auto resulted.    Extra Tube 12/21/2023 Hold for add-ons.   Final    Auto resulted   Admission on 12/18/2023, Discharged on 12/18/2023   Component Date Value Ref Range Status    Glucose 12/18/2023 139 (H)  65 - 99 mg/dL Final    BUN 12/18/2023 10  6 - 20 mg/dL Final    Creatinine 12/18/2023 0.92  0.76 - 1.27 mg/dL Final    Sodium 12/18/2023 134 (L)  136 - 145 mmol/L Final    Potassium 12/18/2023 4.0  3.5 - 5.2 mmol/L Final    Chloride 12/18/2023 98  98 - 107 mmol/L Final    CO2 12/18/2023 24.4  22.0 - 29.0 mmol/L Final    Calcium 12/18/2023 9.1  8.6 - 10.5 mg/dL Final    Total Protein 12/18/2023 7.7  6.0 - 8.5 g/dL Final    Albumin 12/18/2023 3.6  3.5 - 5.2 g/dL Final    ALT (SGPT) 12/18/2023 14  1 - 41 U/L Final    AST (SGOT) 12/18/2023 15  1 - 40 U/L Final    Alkaline Phosphatase 12/18/2023 74  39 - 117 U/L Final    Total Bilirubin 12/18/2023 0.5  0.0 - 1.2 mg/dL Final    Globulin 12/18/2023 4.1  gm/dL Final    A/G Ratio 12/18/2023 0.9  g/dL Final    BUN/Creatinine Ratio 12/18/2023 10.9  7.0 - 25.0 Final    Anion Gap 12/18/2023 11.6  5.0 - 15.0 mmol/L Final    eGFR 12/18/2023 96.4  >60.0 mL/min/1.73 Final     Color, UA 12/18/2023 Yellow  Yellow, Straw Final    Appearance, UA 12/18/2023 Cloudy (A)  Clear Final    pH, UA 12/18/2023 6.0  5.0 - 8.0 Final    Specific Gravity, UA 12/18/2023 1.014  1.005 - 1.030 Final    Glucose, UA 12/18/2023 Negative  Negative Final    Ketones, UA 12/18/2023 Negative  Negative Final    Bilirubin, UA 12/18/2023 Negative  Negative Final    Blood, UA 12/18/2023 Negative  Negative Final    Protein, UA 12/18/2023 Trace (A)  Negative Final    Leuk Esterase, UA 12/18/2023 Trace (A)  Negative Final    Nitrite, UA 12/18/2023 Negative  Negative Final    Urobilinogen, UA 12/18/2023 1.0 E.U./dL  0.2 - 1.0 E.U./dL Final    WBC 12/18/2023 6.51  3.40 - 10.80 10*3/mm3 Final    RBC 12/18/2023 4.43  4.14 - 5.80 10*6/mm3 Final    Hemoglobin 12/18/2023 12.4 (L)  13.0 - 17.7 g/dL Final    Hematocrit 12/18/2023 39.5  37.5 - 51.0 % Final    MCV 12/18/2023 89.2  79.0 - 97.0 fL Final    MCH 12/18/2023 28.0  26.6 - 33.0 pg Final    MCHC 12/18/2023 31.4 (L)  31.5 - 35.7 g/dL Final    RDW 12/18/2023 14.8  12.3 - 15.4 % Final    RDW-SD 12/18/2023 48.2  37.0 - 54.0 fl Final    MPV 12/18/2023 8.8  6.0 - 12.0 fL Final    Platelets 12/18/2023 209  140 - 450 10*3/mm3 Final    Neutrophil % 12/18/2023 66.1  42.7 - 76.0 % Final    Lymphocyte % 12/18/2023 19.4 (L)  19.6 - 45.3 % Final    Monocyte % 12/18/2023 10.1  5.0 - 12.0 % Final    Eosinophil % 12/18/2023 2.6  0.3 - 6.2 % Final    Basophil % 12/18/2023 0.9  0.0 - 1.5 % Final    Immature Grans % 12/18/2023 0.9 (H)  0.0 - 0.5 % Final    Neutrophils, Absolute 12/18/2023 4.30  1.70 - 7.00 10*3/mm3 Final    Lymphocytes, Absolute 12/18/2023 1.26  0.70 - 3.10 10*3/mm3 Final    Monocytes, Absolute 12/18/2023 0.66  0.10 - 0.90 10*3/mm3 Final    Eosinophils, Absolute 12/18/2023 0.17  0.00 - 0.40 10*3/mm3 Final    Basophils, Absolute 12/18/2023 0.06  0.00 - 0.20 10*3/mm3 Final    Immature Grans, Absolute 12/18/2023 0.06 (H)  0.00 - 0.05 10*3/mm3 Final    nRBC 12/18/2023 0.0  0.0 -  0.2 /100 WBC Final    RBC, UA 12/18/2023 0-2  None Seen, 0-2 /HPF Final    WBC, UA 12/18/2023 6-10 (A)  None Seen, 0-2 /HPF Final    Bacteria, UA 12/18/2023 None Seen  None Seen /HPF Final    Squamous Epithelial Cells, UA 12/18/2023 0-2  None Seen, 0-2 /HPF Final    Hyaline Casts, UA 12/18/2023 None Seen  None Seen /LPF Final    Methodology 12/18/2023 Automated Microscopy   Final    Urine Culture 12/18/2023 No growth   Final        Procedure:       Assessment/Plan:   Hernia-massive I do not believe is causing testicular compromise          This document has been electronically signed by ALMA AGUIRRE MD December 21, 2023 14:37 EST    Dictated Utilizing Dragon Dictation: Part of this note may be an electronic transcription/translation of spoken language to printed text using the Dragon Dictation System.

## 2023-12-21 NOTE — PROGRESS NOTES
Subjective   El Nielsen is a 58 y.o. male here today for possible hernia.    History of Present Illness  Mr. Nielesn was seen in the office today for new onset of right scrotal swelling.  He states he has had a right inguinal hernia for a while and saw Dr. Patrick for this in the past but was told he needed to lose weight before it could be repaired.  Over the last couple weeks the patient has developed pain, swelling, erythema in the right scrotum.  This became severe enough that he went to the emergency room on 12/18/2023 and underwent an ultrasound which reported findings related to the left testicle while the patient states that it is the right testicle that is the symptomatic 1 so it is not clear if this was a typographical error on the record on.  CT was recommended but was not done.  Patient states he is voiding and bowels are working.  Allergies   Allergen Reactions    Penicillins Rash     Current Outpatient Medications   Medication Sig Dispense Refill    amLODIPine (NORVASC) 5 MG tablet Take 1 tablet by mouth Daily.      atorvastatin (LIPITOR) 20 MG tablet Take 1 tablet by mouth Daily.      HYDROcodone-acetaminophen (NORCO)  MG per tablet Take 1 tablet by mouth Every 4 (Four) Hours As Needed for Moderate Pain. 15 tablet 0    ketoconazole (NIZORAL) 2 % cream Apply  topically to the appropriate area as directed Daily.      lisinopril-hydrochlorothiazide (PRINZIDE,ZESTORETIC) 20-12.5 MG per tablet Take 1 tablet by mouth Daily.      spironolactone (ALDACTONE) 25 MG tablet Take 25 mg by mouth Daily.       No current facility-administered medications for this visit.     Past Medical History:   Diagnosis Date    Arthritis     Elevated cholesterol     GERD (gastroesophageal reflux disease)     Hypertension     Sleep apnea      Past Surgical History:   Procedure Laterality Date    EYE SURGERY      MENISCECTOMY Left     VASCULAR SURGERY         Pertinent Review of Systems:  Respiratory: no shortness of  "breath  Cardiovascular: no chest pain  Other pertinent:      Objective   /86 (BP Location: Left arm)   Pulse 72   Ht 195.6 cm (77\")   Wt (!) 200 kg (441 lb 9.6 oz)   BMI 52.37 kg/m²   Physical Exam  General:  This is a WD WN morbidly obese male in no acute distress  Lungs:  Respiratory effort normal. Auscultation: Clear, without wheezes, rhonchi, rales  Heart:  Regular rate and rhythm, without murmur, gallop, rub.  No pedal edema  Abdomen: On visual inspection of the abdomen and scrotum there is fullness in the right groin.  However the fullness appears to be more superficial and does not change with cough or Valsalva.  It is difficult to detect whether a true hernia is present given the patient's large body habitus.  On inspection of the scrotum there is erythema and induration of the skin of the scrotum and this is mildly tender.    Procedures     Results/Data:  Imaging: All ultrasound report and images were reviewed and reviewed with radiologist Dr. Nunez      Assessment & Plan   Right scrotal pain and erythema.  Given clinical findings was concerned about torsion and patient was sent from the office to the emergency room where he underwent CT of the abdomen and pelvis.  These images were reviewed with radiologist Dr. Chaudhary.  This demonstrated a large right inguinal hernia containing fat only.  Also demonstrated right scrotal edema and swelling.  As the CT did not offer an explanation for the swelling and induration the patient was sent from the emergency room to urologist Dr. Ferris who examined the patient.  He did not feel that there was any acute testicular issue.    In summary patient has a known right inguinal hernia now with acute development of scrotal swelling and edema.  It is not clear how the 2 are related.  Unfortunately given the patient's large body size with a BMI of over 52 he is at increased risk of recurrence with hernia repair.    My recommendation is for patient to receive an " additional 10 to 14 days of oral antibiotics with reevaluation following.  If the induration worsens he will return to the office sooner.  If it is improved then a decision will need to be made about elective hernia repair.         Discussion/Summary    Time spent:     Class 3 Severe Obesity (BMI >=40). Obesity-related health conditions include the following: hypertension. Obesity is newly identified. BMI is is above average; BMI management plan is completed. We discussed portion control and increasing exercise.       No future appointments.      Please note that portions of this note were completed with a voice recognition program.

## 2023-12-21 NOTE — TELEPHONE ENCOUNTER
Per Dr. Gonzalez told patient to take his antibiotics for 2 weeks and then follow up. Appointment was made and patient knows to call if condition worsens before appointment.

## 2023-12-21 NOTE — ED PROVIDER NOTES
Subjective   History of Present Illness  Patient is a 58-year-old male with history significant for GERD and hypertension who comes to the ER from general surgery office with concern for testicular torsion.  He says he has had a known hernia for this past several years and was seen by outpatient general surgery.  He states his weight and smoking status prevented him from elective hernia repair.  Up until the past week or so he has been okay but on Monday he has pain got much worse.  He came to the ER and was diagnosed with a hernia and sent to general surgery for follow-up.  His appointment was today, general surgery evaluated and was concern for testicular torsion and sent him to the ER for evaluation.  He notes tenderness on exam and swelling of the scrotum.  No fevers or chills.  No urinary complaints.  Reports constipation since getting pain meds on Monday but no other complaints.      Review of Systems   Constitutional:  Negative for chills, fatigue and fever.   HENT:  Negative for congestion, sinus pain and sore throat.    Respiratory:  Negative for cough, chest tightness, shortness of breath and wheezing.    Cardiovascular:  Negative for chest pain, palpitations and leg swelling.   Gastrointestinal:  Negative for abdominal pain, constipation, diarrhea, nausea and vomiting.   Genitourinary:  Negative for dysuria, frequency, hematuria and urgency.   Musculoskeletal:  Positive for arthralgias. Negative for myalgias.   Neurological:  Negative for dizziness, numbness and headaches.   Psychiatric/Behavioral:  Negative for confusion.        Past Medical History:   Diagnosis Date    Arthritis     Elevated cholesterol     GERD (gastroesophageal reflux disease)     Hypertension     Sleep apnea        Allergies   Allergen Reactions    Penicillins Rash       Past Surgical History:   Procedure Laterality Date    EYE SURGERY      MENISCECTOMY Left     VASCULAR SURGERY         Family History   Problem Relation Age of Onset     Arthritis Mother     COPD Mother     Hypertension Father     Arthritis Maternal Grandmother        Social History     Socioeconomic History    Marital status:    Tobacco Use    Smoking status: Former     Packs/day: .5     Types: Cigarettes     Quit date:      Years since quittin.9    Smokeless tobacco: Never   Vaping Use    Vaping Use: Former    Quit date: 2023   Substance and Sexual Activity    Alcohol use: Defer    Drug use: No    Sexual activity: Defer           Objective   Physical Exam  Vitals and nursing note reviewed. Exam conducted with a chaperone present.   Constitutional:       General: He is not in acute distress.     Appearance: He is well-developed. He is obese. He is not ill-appearing.   HENT:      Head: Normocephalic.      Mouth/Throat:      Mouth: Mucous membranes are moist.      Pharynx: Oropharynx is clear.   Eyes:      Extraocular Movements: Extraocular movements intact.      Pupils: Pupils are equal, round, and reactive to light.   Neck:      Vascular: No JVD.   Cardiovascular:      Rate and Rhythm: Normal rate and regular rhythm.      Heart sounds: No murmur heard.     No friction rub. No gallop.   Pulmonary:      Effort: Pulmonary effort is normal. No tachypnea.      Breath sounds: Normal breath sounds. No decreased breath sounds, wheezing, rhonchi or rales.   Abdominal:      General: Bowel sounds are normal.      Palpations: Abdomen is soft.   Genitourinary:     Comments: Scrotum swollen, tender to touch  Musculoskeletal:         General: Normal range of motion.      Cervical back: Normal range of motion and neck supple.      Right lower leg: No edema.      Left lower leg: No edema.   Skin:     General: Skin is warm and dry.      Capillary Refill: Capillary refill takes less than 2 seconds.   Neurological:      General: No focal deficit present.      Mental Status: He is alert and oriented to person, place, and time.   Psychiatric:         Mood and Affect: Mood  normal.         Behavior: Behavior normal.         Procedures           ED Course                                             Medical Decision Making  --Patient is hemodynamically stable  --Labs overall unremarkable  --CT abdomen/pelvis with contrast no torsion, hernia noted  --Discussed with general surgery, follow up w/ urology and outpatient gen surg    Amount and/or Complexity of Data Reviewed  Labs: ordered.  Radiology: ordered.    Risk  Prescription drug management.        Final diagnoses:   Recurrent abdominal hernia without obstruction or gangrene, unspecified hernia type       ED Disposition  ED Disposition       ED Disposition   Discharge    Condition   Stable    Comment   --               Chelo Tellez, APRN  [address]  [facility] [id number] [phone]    In 1 week      Vera Rose MD  [address] [address]  [phone]    In 1 week           Medication List      No changes were made to your prescriptions during this visit.            Nithin Gutierrez,   12/21/23 1422

## 2024-01-04 ENCOUNTER — OFFICE VISIT (OUTPATIENT)
Dept: SURGERY | Facility: CLINIC | Age: 59
End: 2024-01-04
Payer: MEDICARE

## 2024-01-04 VITALS
WEIGHT: 315 LBS | DIASTOLIC BLOOD PRESSURE: 90 MMHG | BODY MASS INDEX: 37.19 KG/M2 | SYSTOLIC BLOOD PRESSURE: 158 MMHG | HEIGHT: 77 IN

## 2024-01-04 DIAGNOSIS — K40.90 NON-RECURRENT UNILATERAL INGUINAL HERNIA WITHOUT OBSTRUCTION OR GANGRENE: ICD-10-CM

## 2024-01-04 DIAGNOSIS — N50.89 SCROTAL SWELLING: Primary | ICD-10-CM

## 2024-01-04 PROCEDURE — 1160F RVW MEDS BY RX/DR IN RCRD: CPT | Performed by: SURGERY

## 2024-01-04 PROCEDURE — 1159F MED LIST DOCD IN RCRD: CPT | Performed by: SURGERY

## 2024-01-04 PROCEDURE — 99213 OFFICE O/P EST LOW 20 MIN: CPT | Performed by: SURGERY

## 2024-01-04 NOTE — PROGRESS NOTES
Subjective   El Nielesn is a 58 y.o. male is here today for follow-up.    History of Present Illness  Mr. Nielsen was seen in the office today for follow-up of his right scrotal pain and swelling.  He was evaluated for this on 12/21/2023.  This was new and separate from his groin swelling which he attributed to a large right inguinal hernia.  At the time of his visit on 12/21/2023 there was concern for possible torsion given the tenderness and induration and therefore patient went immediately to the emergency room where he underwent a CT of the abdomen and pelvis which did identify the hernia without obstruction.  The patient then went from the ED to urology and was evaluated by Dr. Ferris.  He did not feel that there was any acute testicular issue.  Mr. Nielsen was prescribed 2 weeks of antibiotic therapy and he returns today for follow-up.  He states the induration and tenderness is improved.  Allergies   Allergen Reactions    Penicillins Rash       Current Outpatient Medications   Medication Sig Dispense Refill    atorvastatin (LIPITOR) 20 MG tablet Take 1 tablet by mouth Daily.      HYDROcodone-acetaminophen (NORCO)  MG per tablet Take 1 tablet by mouth Every 4 (Four) Hours As Needed for Moderate Pain. 15 tablet 0    ketoconazole (NIZORAL) 2 % cream Apply  topically to the appropriate area as directed Daily.      lisinopril-hydrochlorothiazide (PRINZIDE,ZESTORETIC) 20-12.5 MG per tablet Take 1 tablet by mouth Daily.      amLODIPine (NORVASC) 5 MG tablet Take 1 tablet by mouth Daily.       No current facility-administered medications for this visit.     Past Medical History:   Diagnosis Date    Arthritis     Elevated cholesterol     GERD (gastroesophageal reflux disease)     Hypertension     Sleep apnea      Past Surgical History:   Procedure Laterality Date    EYE SURGERY      MENISCECTOMY Left     VASCULAR SURGERY         Pertinent Review of Systems      Objective   /90   Ht 195.6 cm  "(77.01\")   Wt (!) 200 kg (441 lb)   BMI 52.28 kg/m²    Physical Exam  General:  This is a WD WN morbidly obese male in no acute distress  Lungs:  Respiratory effort normal. Auscultation: Clear, without wheezes, rhonchi, rales  Heart:  Regular rate and rhythm, without murmur, gallop, rub.  No pedal edema  Abdomen: The abdomen is extremely large.  There is fullness in the right groin and separate induration in the right scrotum.  However the erythema and tenderness in the right scrotum have resolved.    Procedures     Results/Data:      Assessment & Plan   Right inguinal hernia without obstruction in morbidly obese male currently weighing over 440 pounds.  Recent acute induration and tenderness of the scrotum resolved.    See discussion       Discussion/Summary: The patient is anxious to have his hernia fixed.  Long discussion with him today explaining why this is not recommended at this time.  Given the large size of the abdomen, I do not feel that robotic or laparoscopic approach would be feasible.  Also feel that the recurrence rates with an open inguinal hernia repair would be high.  I explained to the patient that his best chance of long-lasting repair without recurrence would be the first repair.  The patient needs to lose 100 pounds before considering elective repair.  We discussed going to the weight loss clinic.  The patient is receptive to possible bariatric surgery.  He states he did look into this at [place] prior but was told his insurance did not cover it.  He currently has a different insurance carrier.  The patient does state he \"has to do something.\"  Will refer him to Jos Romero, our nurse practitioner to see if he would be a candidate for bariatric surgery evaluation locally.  Patient was advised that if he does develop symptoms of obstruction that the hernia could be fixed acutely at that time but in the absence of that he would need to lose weight first to give himself the best opportunity " for long-lasting repair.    Time spent:     Class 3 Severe Obesity (BMI >=40). Obesity-related health conditions include the following: none. Obesity is unchanged. BMI is is above average; BMI management plan is completed. We discussed portion control, increasing exercise, and consulting a Bariatric surgeon.         No future appointments.    Please note that portions of this note were completed with a voice recognition program.

## 2025-05-27 NOTE — THERAPY DISCHARGE NOTE
Outpatient Occupational Therapy Lymphedema D/C/Discharge Summary       Patient Name: lE Nielsen  : 1965  MRN: [id number]  Today's Date: 2018      Visit Date: 2018    There is no problem list on file for this patient.       Past Medical History:   Diagnosis Date   • Arthritis    • Hypertension         Past Surgical History:   Procedure Laterality Date   • EYE SURGERY     • VASCULAR SURGERY           Visit Dx:      ICD-10-CM ICD-9-CM   1. Bilateral lower extremity edema R60.0 782.3                                                        Time Calculation:         Therapy Suggested Charges     Code   Minutes Charges    None                       OP OT Discharge Summary  Date of Discharge: 18  Reason for Discharge: Non-compliant  Outcomes Achieved: Other(Pt. did not return for treatment.)  Discharge Destination: (Unknown)  Discharge Instructions: Pt. not compliant with POC.      Vera Bowman, OT  2018   Body Location Override (Optional - Billing Will Still Be Based On Selected Body Map Location If Applicable): right medial chest Accession #: [id number] Date Of Previous Biopsy (Optional): 5/6/25 Size Of Lesion In Cm: 2.2 X Size Of Lesion In Cm (Optional): 0 Size Of Margin In Cm: 1 Anesthesia Volume In Cc: 3 Was An Eye Clamp Used?: No Eye Clamp Note Details: An eye clamp was used during the procedure. Excision Method: Round Saucerization Depth: dermis and superficial adipose tissue Repair Type: None - Secondary Intention Deep Sutures: 5-0 Monocryl Suture Removal: 14 days Suturegard Retention Suture: 2-0 Nylon Retention Suture Bite Size: 3 mm Length To Time In Minutes Device Was In Place: 10 Undermining Type: Entire Wound Debridement Text: The wound edges were debrided prior to proceeding with the closure to facilitate wound healing. Helical Rim Text: The closure involved the helical rim. Vermilion Border Text: The closure involved the vermilion border. Nostril Rim Text: The closure involved the nostril rim. Retention Suture Text: Retention sutures were placed to support the closure and prevent dehiscence. Lab: [id number] Lab Facility: [id number] Graft Donor Site Bandage (Optional-Leave Blank If You Don't Want In Note): Steri-strips and a pressure bandage were applied to the donor site. Epidermal Closure Graft Donor Site (Optional): simple interrupted Billing Type: Third-Party Bill Excision Depth: adipose tissue Scalpel Size: dermablade Anesthesia Type: 1% lidocaine with epinephrine and a 1:10 solution of 8.4% sodium bicarbonate Hemostasis: Electrocautery Estimated Blood Loss (Cc): minimal Detail Level: Detailed Repair Depth: use same depth as excision depth Epidermal Closure: none-secondary intention Wound Care: Mupirocin Dressing: pressure dressing with telfa Suturegard Intro: Intraoperative tissue expansion was performed, utilizing the SUTUREGARD device, in order to reduce wound tension. Suturegard Body: The suture ends were repeatedly re-tightened and re-clamped to achieve the desired tissue expansion. Hemigard Intro: Due to skin fragility and wound tension, it was decided to use HEMIGARD adhesive retention suture devices to permit a linear closure. The skin was cleaned and dried for a 6cm distance away from the wound. Excessive hair, if present, was removed to allow for adhesion. Hemigard Postcare Instructions: The HEMIGARD strips are to remain completely dry for at least 5-7 days. Positioning (Leave Blank If You Do Not Want): The patient was placed in a comfortable position exposing the surgical site. Pre-Excision Curettage Text (Leave Blank If You Do Not Want): Prior to drawing the surgical margin the visible lesion was removed with electrodesiccation and curettage to clearly define the lesion size. Complex Repair Preamble Text (Leave Blank If You Do Not Want): Extensive wide undermining was performed. Intermediate Repair Preamble Text (Leave Blank If You Do Not Want): Undermining was performed with blunt dissection. Curvilinear Excision Additional Text (Leave Blank If You Do Not Want): The margin was drawn around the clinically apparent lesion.  A curvilinear shape was then drawn on the skin incorporating the lesion and margins.  Incisions were then made along these lines to the appropriate tissue plane and the lesion was extirpated. Fusiform Excision Additional Text (Leave Blank If You Do Not Want): The margin was drawn around the clinically apparent lesion.  A fusiform shape was then drawn on the skin incorporating the lesion and margins.  Incisions were then made along these lines to the appropriate tissue plane and the lesion was extirpated. Elliptical Excision Additional Text (Leave Blank If You Do Not Want): The margin was drawn around the clinically apparent lesion.  An elliptical shape was then drawn on the skin incorporating the lesion and margins.  Incisions were then made along these lines to the appropriate tissue plane and the lesion was extirpated. Saucerization Excision Additional Text (Leave Blank If You Do Not Want): The margin was drawn around the clinically apparent lesion.  Incisions were then made along these lines, in a tangential fashion, to the appropriate tissue plane and the lesion was extirpated. Slit Excision Additional Text (Leave Blank If You Do Not Want): A linear line was drawn on the skin overlying the lesion. An incision was made slowly until the lesion was visualized.  Once visualized, the lesion was removed with blunt dissection. Excisional Biopsy Additional Text (Leave Blank If You Do Not Want): The margin was drawn around the clinically apparent lesion. An elliptical shape was then drawn on the skin incorporating the lesion and margins.  Incisions were then made along these lines to the appropriate tissue plane and the lesion was extirpated. Perilesional Excision Additional Text (Leave Blank If You Do Not Want): The margin was drawn around the clinically apparent lesion. Incisions were then made along these lines to the appropriate tissue plane and the lesion was extirpated. Repair Performed By Another Provider Text (Leave Blank If You Do Not Want): After the tissue was excised the defect was repaired by another provider. No Repair - Repaired With Adjacent Surgical Defect Text (Leave Blank If You Do Not Want): After the excision the defect was repaired concurrently with another surgical defect which was in close approximation. Adjacent Tissue Transfer Text: The defect edges were debeveled with a #15 scalpel blade. Given the location of the defect and the proximity to free margins an adjacent tissue transfer was deemed most appropriate. Using a sterile surgical marker, an appropriate flap was drawn incorporating the defect and placing the expected incisions within the relaxed skin tension lines where possible. The area thus outlined was incised deep to adipose tissue with a #15 scalpel blade. The skin margins were undermined to an appropriate distance in all directions utilizing iris scissors and carried over to close the primary defect. Advancement Flap (Single) Text: The defect edges were debeveled with a #15 scalpel blade.  Given the location of the defect and the proximity to free margins a single advancement flap was deemed most appropriate.  Using a sterile surgical marker, an appropriate advancement flap was drawn incorporating the defect and placing the expected incisions within the relaxed skin tension lines where possible.    The area thus outlined was incised deep to adipose tissue with a #15 scalpel blade.  The skin margins were undermined to an appropriate distance in all directions utilizing iris scissors. Advancement Flap (Double) Text: The defect edges were debeveled with a #15 scalpel blade.  Given the location of the defect and the proximity to free margins a double advancement flap was deemed most appropriate.  Using a sterile surgical marker, the appropriate advancement flaps were drawn incorporating the defect and placing the expected incisions within the relaxed skin tension lines where possible.    The area thus outlined was incised deep to adipose tissue with a #15 scalpel blade.  The skin margins were undermined to an appropriate distance in all directions utilizing iris scissors. Burow's Advancement Flap Text: The defect edges were debeveled with a #15 scalpel blade.  Given the location of the defect and the proximity to free margins a Burow's advancement flap was deemed most appropriate.  Using a sterile surgical marker, the appropriate advancement flap was drawn incorporating the defect and placing the expected incisions within the relaxed skin tension lines where possible.    The area thus outlined was incised deep to adipose tissue with a #15 scalpel blade.  The skin margins were undermined to an appropriate distance in all directions utilizing iris scissors. Chonodrocutaneous Helical Advancement Flap Text: The defect edges were debeveled with a #15 scalpel blade. Given the location of the defect and the proximity to free margins a chondrocutaneous helical advancement flap was deemed most appropriate. Using a sterile surgical marker, the appropriate advancement flap was drawn incorporating the defect and placing the expected incisions within the relaxed skin tension lines where possible. The area thus outlined was incised deep to adipose tissue with a #15 scalpel blade. The skin margins were undermined to an appropriate distance in all directions utilizing iris scissors. Following this, the designed flap was advanced and carried over into the primary defect and sutured into place. Crescentic Advancement Flap Text: The defect edges were debeveled with a #15 scalpel blade.  Given the location of the defect and the proximity to free margins a crescentic advancement flap was deemed most appropriate.  Using a sterile surgical marker, the appropriate advancement flap was drawn incorporating the defect and placing the expected incisions within the relaxed skin tension lines where possible.    The area thus outlined was incised deep to adipose tissue with a #15 scalpel blade.  The skin margins were undermined to an appropriate distance in all directions utilizing iris scissors. A-T Advancement Flap Text: The defect edges were debeveled with a #15 scalpel blade.  Given the location of the defect, shape of the defect and the proximity to free margins an A-T advancement flap was deemed most appropriate.  Using a sterile surgical marker, an appropriate advancement flap was drawn incorporating the defect and placing the expected incisions within the relaxed skin tension lines where possible.    The area thus outlined was incised deep to adipose tissue with a #15 scalpel blade.  The skin margins were undermined to an appropriate distance in all directions utilizing iris scissors. O-T Advancement Flap Text: The defect edges were debeveled with a #15 scalpel blade.  Given the location of the defect, shape of the defect and the proximity to free margins an O-T advancement flap was deemed most appropriate.  Using a sterile surgical marker, an appropriate advancement flap was drawn incorporating the defect and placing the expected incisions within the relaxed skin tension lines where possible.    The area thus outlined was incised deep to adipose tissue with a #15 scalpel blade.  The skin margins were undermined to an appropriate distance in all directions utilizing iris scissors. O-L Flap Text: The defect edges were debeveled with a #15 scalpel blade.  Given the location of the defect, shape of the defect and the proximity to free margins an O-L flap was deemed most appropriate.  Using a sterile surgical marker, an appropriate advancement flap was drawn incorporating the defect and placing the expected incisions within the relaxed skin tension lines where possible.    The area thus outlined was incised deep to adipose tissue with a #15 scalpel blade.  The skin margins were undermined to an appropriate distance in all directions utilizing iris scissors. O-Z Flap Text: The defect edges were debeveled with a #15 scalpel blade. Given the location of the defect, shape of the defect and the proximity to free margins an O-Z flap was deemed most appropriate. Using a sterile surgical marker, an appropriate transposition flap was drawn incorporating the defect and placing the expected incisions within the relaxed skin tension lines where possible. The area thus outlined was incised deep to adipose tissue with a #15 scalpel blade. The skin margins were undermined to an appropriate distance in all directions utilizing iris scissors. Following this, the designed flap was carried over into the primary defect and sutured into place. Double O-Z Flap Text: The defect edges were debeveled with a #15 scalpel blade. Given the location of the defect, shape of the defect and the proximity to free margins a Double O-Z flap was deemed most appropriate. Using a sterile surgical marker, an appropriate transposition flap was drawn incorporating the defect and placing the expected incisions within the relaxed skin tension lines where possible. The area thus outlined was incised deep to adipose tissue with a #15 scalpel blade. The skin margins were undermined to an appropriate distance in all directions utilizing iris scissors. Following this, the designed flap was carried over into the primary defect and sutured into place. V-Y Flap Text: The defect edges were debeveled with a #15 scalpel blade.  Given the location of the defect, shape of the defect and the proximity to free margins a V-Y flap was deemed most appropriate.  Using a sterile surgical marker, an appropriate advancement flap was drawn incorporating the defect and placing the expected incisions within the relaxed skin tension lines where possible.    The area thus outlined was incised deep to adipose tissue with a #15 scalpel blade.  The skin margins were undermined to an appropriate distance in all directions utilizing iris scissors. Advancement-Rotation Flap Text: The defect edges were debeveled with a #15 scalpel blade.  Given the location of the defect, shape of the defect and the proximity to free margins an advancement-rotation flap was deemed most appropriate.  Using a sterile surgical marker, an appropriate flap was drawn incorporating the defect and placing the expected incisions within the relaxed skin tension lines where possible. The area thus outlined was incised deep to adipose tissue with a #15 scalpel blade.  The skin margins were undermined to an appropriate distance in all directions utilizing iris scissors. Mercedes Flap Text: The defect edges were debeveled with a #15 scalpel blade. Given the location of the defect, shape of the defect and the proximity to free margins a Mercedes flap was deemed most appropriate. Using a sterile surgical marker, an appropriate advancement flap was drawn incorporating the defect and placing the expected incisions within the relaxed skin tension lines where possible. The area thus outlined was incised deep to adipose tissue with a #15 scalpel blade. The skin margins were undermined to an appropriate distance in all directions utilizing iris scissors. Following this, the designed flap was advanced and carried over into the primary defect and sutured into place. Modified Advancement Flap Text: The defect edges were debeveled with a #15 scalpel blade.  Given the location of the defect, shape of the defect and the proximity to free margins a modified advancement flap was deemed most appropriate.  Using a sterile surgical marker, an appropriate advancement flap was drawn incorporating the defect and placing the expected incisions within the relaxed skin tension lines where possible.    The area thus outlined was incised deep to adipose tissue with a #15 scalpel blade.  The skin margins were undermined to an appropriate distance in all directions utilizing iris scissors. Mucosal Advancement Flap Text: Given the location of the defect, shape of the defect and the proximity to free margins a mucosal advancement flap was deemed most appropriate. Incisions were made with a 15 blade scalpel in the appropriate fashion along the cutaneous vermilion border and the mucosal lip. The remaining actinically damaged mucosal tissue was excised.  The mucosal advancement flap was then elevated to the gingival sulcus with care taken to preserve the neurovascular structures and advanced into the primary defect. Care was taken to ensure that precise realignment of the vermilion border was achieved. Peng Advancement Flap Text: The defect edges were debeveled with a #15 scalpel blade. Given the location of the defect, shape of the defect and the proximity to free margins a Peng advancement flap was deemed most appropriate. Using a sterile surgical marker, an appropriate advancement flap was drawn incorporating the defect and placing the expected incisions within the relaxed skin tension lines where possible. The area thus outlined was incised deep to adipose tissue with a #15 scalpel blade. The skin margins were undermined to an appropriate distance in all directions utilizing iris scissors. Following this, the designed flap was advanced and carried over into the primary defect and sutured into place. Hatchet Flap Text: The defect edges were debeveled with a #15 scalpel blade.  Given the location of the defect, shape of the defect and the proximity to free margins a hatchet flap was deemed most appropriate.  Using a sterile surgical marker, an appropriate hatchet flap was drawn incorporating the defect and placing the expected incisions within the relaxed skin tension lines where possible.    The area thus outlined was incised deep to adipose tissue with a #15 scalpel blade.  The skin margins were undermined to an appropriate distance in all directions utilizing iris scissors. Rotation Flap Text: The defect edges were debeveled with a #15 scalpel blade.  Given the location of the defect, shape of the defect and the proximity to free margins a rotation flap was deemed most appropriate.  Using a sterile surgical marker, an appropriate rotation flap was drawn incorporating the defect and placing the expected incisions within the relaxed skin tension lines where possible.    The area thus outlined was incised deep to adipose tissue with a #15 scalpel blade.  The skin margins were undermined to an appropriate distance in all directions utilizing iris scissors. Bilateral Rotation Flap Text: The defect edges were debeveled with a #15 scalpel blade. Given the location of the defect, shape of the defect and the proximity to free margins a bilateral rotation flap was deemed most appropriate. Using a sterile surgical marker, an appropriate rotation flap was drawn incorporating the defect and placing the expected incisions within the relaxed skin tension lines where possible. The area thus outlined was incised deep to adipose tissue with a #15 scalpel blade. The skin margins were undermined to an appropriate distance in all directions utilizing iris scissors. Following this, the designed flap was carried over into the primary defect and sutured into place. Spiral Flap Text: The defect edges were debeveled with a #15 scalpel blade.  Given the location of the defect, shape of the defect and the proximity to free margins a spiral flap was deemed most appropriate.  Using a sterile surgical marker, an appropriate rotation flap was drawn incorporating the defect and placing the expected incisions within the relaxed skin tension lines where possible. The area thus outlined was incised deep to adipose tissue with a #15 scalpel blade.  The skin margins were undermined to an appropriate distance in all directions utilizing iris scissors. Staged Advancement Flap Text: The defect edges were debeveled with a #15 scalpel blade. Given the location of the defect, shape of the defect and the proximity to free margins a staged advancement flap was deemed most appropriate. Using a sterile surgical marker, an appropriate advancement flap was drawn incorporating the defect and placing the expected incisions within the relaxed skin tension lines where possible. The area thus outlined was incised deep to adipose tissue with a #15 scalpel blade. The skin margins were undermined to an appropriate distance in all directions utilizing iris scissors. Following this, the designed flap was carried over into the primary defect and sutured into place. Star Wedge Flap Text: The defect edges were debeveled with a #15 scalpel blade. Given the location of the defect, shape of the defect and the proximity to free margins a star wedge flap was deemed most appropriate. Using a sterile surgical marker, an appropriate rotation flap was drawn incorporating the defect and placing the expected incisions within the relaxed skin tension lines where possible. The area thus outlined was incised deep to adipose tissue with a #15 scalpel blade. The skin margins were undermined to an appropriate distance in all directions utilizing iris scissors. Following this, the designed flap was carried over into the primary defect and sutured into place. Transposition Flap Text: The defect edges were debeveled with a #15 scalpel blade.  Given the location of the defect and the proximity to free margins a transposition flap was deemed most appropriate.  Using a sterile surgical marker, an appropriate transposition flap was drawn incorporating the defect.    The area thus outlined was incised deep to adipose tissue with a #15 scalpel blade.  The skin margins were undermined to an appropriate distance in all directions utilizing iris scissors. Muscle Hinge Flap Text: The defect edges were debeveled with a #15 scalpel blade.  Given the size, depth and location of the defect and the proximity to free margins a muscle hinge flap was deemed most appropriate.  Using a sterile surgical marker, an appropriate hinge flap was drawn incorporating the defect. The area thus outlined was incised with a #15 scalpel blade.  The skin margins were undermined to an appropriate distance in all directions utilizing iris scissors. Mustarde Flap Text: The defect edges were debeveled with a #15 scalpel blade.  Given the size, depth and location of the defect and the proximity to free margins a Mustarde flap was deemed most appropriate. Using a sterile surgical marker, an appropriate flap was drawn incorporating the defect. The area thus outlined was incised with a #15 scalpel blade. The skin margins were undermined to an appropriate distance in all directions utilizing iris scissors. Following this, the designed flap was carried into the primary defect and sutured into place. Nasal Turnover Hinge Flap Text: The defect edges were debeveled with a #15 scalpel blade.  Given the size, depth, location of the defect and the defect being full thickness a nasal turnover hinge flap was deemed most appropriate. Using a sterile surgical marker, an appropriate hinge flap was drawn incorporating the defect. The area thus outlined was incised with a #15 scalpel blade. The flap was designed to recreate the nasal mucosal lining and the alar rim. The skin margins were undermined to an appropriate distance in all directions utilizing iris scissors. Following this, the designed flap was carried over into the primary defect and sutured into place Nasalis-Muscle-Based Myocutaneous Island Pedicle Flap Text: Using a #15 blade, an incision was made around the donor flap to the level of the nasalis muscle. Wide lateral undermining was then performed in both the subcutaneous plane above the nasalis muscle, and in a submuscular plane just above periosteum. This allowed the formation of a free nasalis muscle axial pedicle (based on the angular artery) which was still attached to the actual cutaneous flap, increasing its mobility and vascular viability. Hemostasis was obtained with pinpoint electrocoagulation. The flap was mobilized into position and the pivotal anchor points positioned and stabilized with buried interrupted sutures. Subcutaneous and dermal tissues were closed in a multilayered fashion with sutures. Tissue redundancies were excised, and the epidermal edges were apposed without significant tension and sutured with sutures. Nasalis Myocutaneous Flap Text: Using a #15 blade, an incision was made around the donor flap to the level of the nasalis muscle. Wide lateral undermining was then performed in both the subcutaneous plane above the nasalis muscle, and in a submuscular plane just above periosteum. This allowed the formation of a free nasalis muscle axial pedicle which was still attached to the actual cutaneous flap, increasing its mobility and vascular viability. Hemostasis was obtained with pinpoint electrocoagulation. The flap was mobilized into position and the pivotal anchor points positioned and stabilized with buried interrupted sutures. Subcutaneous and dermal tissues were closed in a multilayered fashion with sutures. Tissue redundancies were excised, and the epidermal edges were apposed without significant tension and sutured with sutures. Nasolabial Transposition Flap Text: The defect edges were debeveled with a #15 scalpel blade.  Given the size, depth and location of the defect and the proximity to free margins a nasolabial transposition flap was deemed most appropriate. Using a sterile surgical marker, an appropriate flap was drawn incorporating the defect. The area thus outlined was incised with a #15 scalpel blade. The skin margins were undermined to an appropriate distance in all directions utilizing iris scissors. Following this, the designed flap was carried into the primary defect and sutured into place. Orbicularis Oris Muscle Flap Text: The defect edges were debeveled with a #15 scalpel blade.  Given that the defect affected the competency of the oral sphincter an orbicularis oris muscle flap was deemed most appropriate to restore this competency and normal muscle function.  Using a sterile surgical marker, an appropriate flap was drawn incorporating the defect. The area thus outlined was incised with a #15 scalpel blade. Following this, the designed flap was carried over into the primary defect and sutured into place. Melolabial Transposition Flap Text: The defect edges were debeveled with a #15 scalpel blade.  Given the location of the defect and the proximity to free margins a melolabial flap was deemed most appropriate.  Using a sterile surgical marker, an appropriate melolabial transposition flap was drawn incorporating the defect.    The area thus outlined was incised deep to adipose tissue with a #15 scalpel blade.  The skin margins were undermined to an appropriate distance in all directions utilizing iris scissors. Rectangular Flap Text: The defect edges were debeveled with a #15 scalpel blade. Given the location of the defect and the proximity to free margins a rectangular flap was deemed most appropriate. Using a sterile surgical marker, an appropriate rectangular flap was drawn incorporating the defect. The area thus outlined was incised deep to adipose tissue with a #15 scalpel blade. The skin margins were undermined to an appropriate distance in all directions utilizing iris scissors. Following this, the designed flap was carried over into the primary defect and sutured into place. Rhombic Flap Text: The defect edges were debeveled with a #15 scalpel blade.  Given the location of the defect and the proximity to free margins a rhombic flap was deemed most appropriate.  Using a sterile surgical marker, an appropriate rhombic flap was drawn incorporating the defect.    The area thus outlined was incised deep to adipose tissue with a #15 scalpel blade.  The skin margins were undermined to an appropriate distance in all directions utilizing iris scissors. Rhomboid Transposition Flap Text: The defect edges were debeveled with a #15 scalpel blade. Given the location of the defect and the proximity to free margins a rhomboid transposition flap was deemed most appropriate. Using a sterile surgical marker, an appropriate rhomboid flap was drawn incorporating the defect. The area thus outlined was incised deep to adipose tissue with a #15 scalpel blade. The skin margins were undermined to an appropriate distance in all directions utilizing iris scissors. Following this, the designed flap was carried over into the primary defect and sutured into place. Bi-Rhombic Flap Text: The defect edges were debeveled with a #15 scalpel blade.  Given the location of the defect and the proximity to free margins a bi-rhombic flap was deemed most appropriate.  Using a sterile surgical marker, an appropriate rhombic flap was drawn incorporating the defect. The area thus outlined was incised deep to adipose tissue with a #15 scalpel blade.  The skin margins were undermined to an appropriate distance in all directions utilizing iris scissors. Helical Rim Advancement Flap Text: The defect edges were debeveled with a #15 blade scalpel.  Given the location of the defect and the proximity to free margins (helical rim) a double helical rim advancement flap was deemed most appropriate.  Using a sterile surgical marker, the appropriate advancement flaps were drawn incorporating the defect and placing the expected incisions between the helical rim and antihelix where possible.  The area thus outlined was incised through and through with a #15 scalpel blade.  With a skin hook and iris scissors, the flaps were gently and sharply undermined and freed up. Bilateral Helical Rim Advancement Flap Text: The defect edges were debeveled with a #15 blade scalpel.  Given the location of the defect and the proximity to free margins (helical rim) a bilateral helical rim advancement flap was deemed most appropriate.  Using a sterile surgical marker, the appropriate advancement flaps were drawn incorporating the defect and placing the expected incisions between the helical rim and antihelix where possible.  The area thus outlined was incised through and through with a #15 scalpel blade.  With a skin hook and iris scissors, the flaps were gently and sharply undermined and freed up. Ear Star Wedge Flap Text: The defect edges were debeveled with a #15 blade scalpel.  Given the location of the defect and the proximity to free margins (helical rim) an ear star wedge flap was deemed most appropriate.  Using a sterile surgical marker, the appropriate flap was drawn incorporating the defect and placing the expected incisions between the helical rim and antihelix where possible.  The area thus outlined was incised through and through with a #15 scalpel blade. Flip-Flop Flap Text: The defect edges were debeveled with a #15 blade scalpel.  Given the location of the defect and the proximity to free margins a flip-flop flap was deemed most appropriate. Using a sterile surgical marker, the appropriate flap was drawn incorporating the defect and placing the expected incisions between the helical rim and antihelix where possible.  The area thus outlined was incised through and through with a #15 scalpel blade. Following this, the designed flap was carried over into the primary defect and sutured into place. Banner Transposition Flap Text: The defect edges were debeveled with a #15 scalpel blade. Given the location of the defect and the proximity to free margins a Banner transposition flap was deemed most appropriate. Using a sterile surgical marker, an appropriate flap was drawn around the defect. The area thus outlined was incised deep to adipose tissue with a #15 scalpel blade. The skin margins were undermined to an appropriate distance in all directions utilizing iris scissors. Following this, the designed flap was carried into the primary defect and sutured into place. Bilobed Flap Text: The defect edges were debeveled with a #15 scalpel blade.  Given the location of the defect and the proximity to free margins a bilobe flap was deemed most appropriate.  Using a sterile surgical marker, an appropriate bilobe flap drawn around the defect.    The area thus outlined was incised deep to adipose tissue with a #15 scalpel blade.  The skin margins were undermined to an appropriate distance in all directions utilizing iris scissors. Bilobed Transposition Flap Text: The defect edges were debeveled with a #15 scalpel blade. Given the location of the defect and the proximity to free margins a bilobed transposition flap was deemed most appropriate. Using a sterile surgical marker, an appropriate bilobe flap drawn around the defect. The area thus outlined was incised deep to adipose tissue with a #15 scalpel blade. The skin margins were undermined to an appropriate distance in all directions utilizing iris scissors. Following this, the designed flap was carried over into the primary defect and sutured into place. Trilobed Flap Text: The defect edges were debeveled with a #15 scalpel blade.  Given the location of the defect and the proximity to free margins a trilobed flap was deemed most appropriate.  Using a sterile surgical marker, an appropriate trilobed flap drawn around the defect.    The area thus outlined was incised deep to adipose tissue with a #15 scalpel blade.  The skin margins were undermined to an appropriate distance in all directions utilizing iris scissors. Dorsal Nasal Flap Text: The defect edges were debeveled with a #15 scalpel blade.  Given the location of the defect and the proximity to free margins a dorsal nasal flap was deemed most appropriate.  Using a sterile surgical marker, an appropriate dorsal nasal flap was drawn around the defect.    The area thus outlined was incised deep to adipose tissue with a #15 scalpel blade.  The skin margins were undermined to an appropriate distance in all directions utilizing iris scissors. Island Pedicle Flap Text: The defect edges were debeveled with a #15 scalpel blade.  Given the location of the defect, shape of the defect and the proximity to free margins an island pedicle advancement flap was deemed most appropriate.  Using a sterile surgical marker, an appropriate advancement flap was drawn incorporating the defect, outlining the appropriate donor tissue and placing the expected incisions within the relaxed skin tension lines where possible.    The area thus outlined was incised deep to adipose tissue with a #15 scalpel blade.  The skin margins were undermined to an appropriate distance in all directions around the primary defect and laterally outward around the island pedicle utilizing iris scissors.  There was minimal undermining beneath the pedicle flap. Island Pedicle Flap With Canthal Suspension Text: The defect edges were debeveled with a #15 scalpel blade.  Given the location of the defect, shape of the defect and the proximity to free margins an island pedicle advancement flap was deemed most appropriate.  Using a sterile surgical marker, an appropriate advancement flap was drawn incorporating the defect, outlining the appropriate donor tissue and placing the expected incisions within the relaxed skin tension lines where possible. The area thus outlined was incised deep to adipose tissue with a #15 scalpel blade.  The skin margins were undermined to an appropriate distance in all directions around the primary defect and laterally outward around the island pedicle utilizing iris scissors.  There was minimal undermining beneath the pedicle flap. A suspension suture was placed in the canthal tendon to prevent tension and prevent ectropion. Alar Island Pedicle Flap Text: The defect edges were debeveled with a #15 scalpel blade.  Given the location of the defect, shape of the defect and the proximity to the alar rim an island pedicle advancement flap was deemed most appropriate.  Using a sterile surgical marker, an appropriate advancement flap was drawn incorporating the defect, outlining the appropriate donor tissue and placing the expected incisions within the nasal ala running parallel to the alar rim. The area thus outlined was incised with a #15 scalpel blade.  The skin margins were undermined minimally to an appropriate distance in all directions around the primary defect and laterally outward around the island pedicle utilizing iris scissors.  There was minimal undermining beneath the pedicle flap. Double Island Pedicle Flap Text: The defect edges were debeveled with a #15 scalpel blade.  Given the location of the defect, shape of the defect and the proximity to free margins a double island pedicle advancement flap was deemed most appropriate.  Using a sterile surgical marker, an appropriate advancement flap was drawn incorporating the defect, outlining the appropriate donor tissue and placing the expected incisions within the relaxed skin tension lines where possible.    The area thus outlined was incised deep to adipose tissue with a #15 scalpel blade.  The skin margins were undermined to an appropriate distance in all directions around the primary defect and laterally outward around the island pedicle utilizing iris scissors.  There was minimal undermining beneath the pedicle flap. Island Pedicle Flap-Requiring Vessel Identification Text: The defect edges were debeveled with a #15 scalpel blade.  Given the location of the defect, shape of the defect and the proximity to free margins an island pedicle advancement flap was deemed most appropriate.  Using a sterile surgical marker, an appropriate advancement flap was drawn, based on the axial vessel mentioned above, incorporating the defect, outlining the appropriate donor tissue and placing the expected incisions within the relaxed skin tension lines where possible.    The area thus outlined was incised deep to adipose tissue with a #15 scalpel blade.  The skin margins were undermined to an appropriate distance in all directions around the primary defect and laterally outward around the island pedicle utilizing iris scissors.  There was minimal undermining beneath the pedicle flap. Keystone Flap Text: The defect edges were debeveled with a #15 scalpel blade. Given the location of the defect, shape of the defect a keystone flap was deemed most appropriate. Using a sterile surgical marker, an appropriate keystone flap was drawn incorporating the defect, outlining the appropriate donor tissue and placing the expected incisions within the relaxed skin tension lines where possible. The area thus outlined was incised deep to adipose tissue with a #15 scalpel blade. The skin margins were undermined to an appropriate distance in all directions around the primary defect and laterally outward around the flap utilizing iris scissors. Following this, the designed flap was carried into the primary defect and sutured into place. O-T Plasty Text: The defect edges were debeveled with a #15 scalpel blade.  Given the location of the defect, shape of the defect and the proximity to free margins an O-T plasty was deemed most appropriate.  Using a sterile surgical marker, an appropriate O-T plasty was drawn incorporating the defect and placing the expected incisions within the relaxed skin tension lines where possible.    The area thus outlined was incised deep to adipose tissue with a #15 scalpel blade.  The skin margins were undermined to an appropriate distance in all directions utilizing iris scissors. O-Z Plasty Text: The defect edges were debeveled with a #15 scalpel blade.  Given the location of the defect, shape of the defect and the proximity to free margins an O-Z plasty (double transposition flap) was deemed most appropriate.  Using a sterile surgical marker, the appropriate transposition flaps were drawn incorporating the defect and placing the expected incisions within the relaxed skin tension lines where possible.    The area thus outlined was incised deep to adipose tissue with a #15 scalpel blade.  The skin margins were undermined to an appropriate distance in all directions utilizing iris scissors.  Hemostasis was achieved with electrocautery.  The flaps were then transposed into place, one clockwise and the other counterclockwise, and anchored with interrupted buried subcutaneous sutures. Double O-Z Plasty Text: The defect edges were debeveled with a #15 scalpel blade. Given the location of the defect, shape of the defect and the proximity to free margins a Double O-Z plasty (double transposition flap) was deemed most appropriate. Using a sterile surgical marker, the appropriate transposition flaps were drawn incorporating the defect and placing the expected incisions within the relaxed skin tension lines where possible. The area thus outlined was incised deep to adipose tissue with a #15 scalpel blade. The skin margins were undermined to an appropriate distance in all directions utilizing iris scissors. Hemostasis was achieved with electrocautery. The flaps were then transposed and carried over into place, one clockwise and the other counterclockwise, and anchored with interrupted buried subcutaneous sutures. V-Y Plasty Text: The defect edges were debeveled with a #15 scalpel blade.  Given the location of the defect, shape of the defect and the proximity to free margins an V-Y advancement flap was deemed most appropriate.  Using a sterile surgical marker, an appropriate advancement flap was drawn incorporating the defect and placing the expected incisions within the relaxed skin tension lines where possible.    The area thus outlined was incised deep to adipose tissue with a #15 scalpel blade.  The skin margins were undermined to an appropriate distance in all directions utilizing iris scissors. H Plasty Text: Given the location of the defect, shape of the defect and the proximity to free margins a H-plasty was deemed most appropriate for repair.  Using a sterile surgical marker, the appropriate advancement arms of the H-plasty were drawn incorporating the defect and placing the expected incisions within the relaxed skin tension lines where possible. The area thus outlined was incised deep to adipose tissue with a #15 scalpel blade. The skin margins were undermined to an appropriate distance in all directions utilizing iris scissors.  The opposing advancement arms were then advanced into place in opposite direction and anchored with interrupted buried subcutaneous sutures. W Plasty Text: The lesion was extirpated to the level of the fat with a #15 scalpel blade.  Given the location of the defect, shape of the defect and the proximity to free margins a W-plasty was deemed most appropriate for repair.  Using a sterile surgical marker, the appropriate transposition arms of the W-plasty were drawn incorporating the defect and placing the expected incisions within the relaxed skin tension lines where possible.    The area thus outlined was incised deep to adipose tissue with a #15 scalpel blade.  The skin margins were undermined to an appropriate distance in all directions utilizing iris scissors.  The opposing transposition arms were then transposed into place in opposite direction and anchored with interrupted buried subcutaneous sutures. Z Plasty Text: The lesion was extirpated to the level of the fat with a #15 scalpel blade.  Given the location of the defect, shape of the defect and the proximity to free margins a Z-plasty was deemed most appropriate for repair.  Using a sterile surgical marker, the appropriate transposition arms of the Z-plasty were drawn incorporating the defect and placing the expected incisions within the relaxed skin tension lines where possible.    The area thus outlined was incised deep to adipose tissue with a #15 scalpel blade.  The skin margins were undermined to an appropriate distance in all directions utilizing iris scissors.  The opposing transposition arms were then transposed into place in opposite direction and anchored with interrupted buried subcutaneous sutures. Double Z Plasty Text: The lesion was extirpated to the level of the fat with a #15 scalpel blade. Given the location of the defect, shape of the defect and the proximity to free margins a double Z-plasty was deemed most appropriate for repair. Using a sterile surgical marker, the appropriate transposition arms of the double Z-plasty were drawn incorporating the defect and placing the expected incisions within the relaxed skin tension lines where possible. The area thus outlined was incised deep to adipose tissue with a #15 scalpel blade. The skin margins were undermined to an appropriate distance in all directions utilizing iris scissors. The opposing transposition arms were then transposed and carried over into place in opposite direction and anchored with interrupted buried subcutaneous sutures. Zygomaticofacial Flap Text: Given the location of the defect, shape of the defect and the proximity to free margins a zygomaticofacial flap was deemed most appropriate for repair. Using a sterile surgical marker, the appropriate flap was drawn incorporating the defect and placing the expected incisions within the relaxed skin tension lines where possible. The area thus outlined was incised deep to adipose tissue with a #15 scalpel blade with preservation of a vascular pedicle.  The skin margins were undermined to an appropriate distance in all directions utilizing iris scissors. The flap was then carried over into the defect and anchored with interrupted buried subcutaneous sutures. Cheek Interpolation Flap Text: A decision was made to reconstruct the defect utilizing an interpolation axial flap and a staged reconstruction.  A telfa template was made of the defect.  This telfa template was then used to outline the Cheek Interpolation flap.  The donor area for the pedicle flap was then injected with anesthesia.  The flap was excised through the skin and subcutaneous tissue down to the layer of the underlying musculature.  The interpolation flap was carefully excised within this deep plane to maintain its blood supply.  The edges of the donor site were undermined.   The donor site was closed in a primary fashion.  The pedicle was then rotated into position and sutured.  Once the tube was sutured into place, adequate blood supply was confirmed with blanching and refill.  The pedicle was then wrapped with xeroform gauze and dressed appropriately with a telfa and gauze bandage to ensure continued blood supply and protect the attached pedicle. Cheek-To-Nose Interpolation Flap Text: A decision was made to reconstruct the defect utilizing an interpolation axial flap and a staged reconstruction.  A telfa template was made of the defect.  This telfa template was then used to outline the Cheek-To-Nose Interpolation flap.  The donor area for the pedicle flap was then injected with anesthesia.  The flap was excised through the skin and subcutaneous tissue down to the layer of the underlying musculature.  The interpolation flap was carefully excised within this deep plane to maintain its blood supply.  The edges of the donor site were undermined.   The donor site was closed in a primary fashion.  The pedicle was then rotated into position and sutured.  Once the tube was sutured into place, adequate blood supply was confirmed with blanching and refill.  The pedicle was then wrapped with xeroform gauze and dressed appropriately with a telfa and gauze bandage to ensure continued blood supply and protect the attached pedicle. Interpolation Flap Text: A decision was made to reconstruct the defect utilizing an interpolation axial flap and a staged reconstruction.  A telfa template was made of the defect.  This telfa template was then used to outline the interpolation flap.  The donor area for the pedicle flap was then injected with anesthesia.  The flap was excised through the skin and subcutaneous tissue down to the layer of the underlying musculature.  The interpolation flap was carefully excised within this deep plane to maintain its blood supply.  The edges of the donor site were undermined.   The donor site was closed in a primary fashion.  The pedicle was then rotated into position and sutured.  Once the tube was sutured into place, adequate blood supply was confirmed with blanching and refill.  The pedicle was then wrapped with xeroform gauze and dressed appropriately with a telfa and gauze bandage to ensure continued blood supply and protect the attached pedicle. Melolabial Interpolation Flap Text: A decision was made to reconstruct the defect utilizing an interpolation axial flap and a staged reconstruction.  A telfa template was made of the defect.  This telfa template was then used to outline the melolabial interpolation flap.  The donor area for the pedicle flap was then injected with anesthesia.  The flap was excised through the skin and subcutaneous tissue down to the layer of the underlying musculature.  The pedicle flap was carefully excised within this deep plane to maintain its blood supply.  The edges of the donor site were undermined.   The donor site was closed in a primary fashion.  The pedicle was then rotated into position and sutured.  Once the tube was sutured into place, adequate blood supply was confirmed with blanching and refill.  The pedicle was then wrapped with xeroform gauze and dressed appropriately with a telfa and gauze bandage to ensure continued blood supply and protect the attached pedicle. Mastoid Interpolation Flap Text: A decision was made to reconstruct the defect utilizing an interpolation axial flap and a staged reconstruction.  A telfa template was made of the defect.  This telfa template was then used to outline the mastoid interpolation flap.  The donor area for the pedicle flap was then injected with anesthesia.  The flap was excised through the skin and subcutaneous tissue down to the layer of the underlying musculature.  The pedicle flap was carefully excised within this deep plane to maintain its blood supply.  The edges of the donor site were undermined.   The donor site was closed in a primary fashion.  The pedicle was then rotated into position and sutured.  Once the tube was sutured into place, adequate blood supply was confirmed with blanching and refill.  The pedicle was then wrapped with xeroform gauze and dressed appropriately with a telfa and gauze bandage to ensure continued blood supply and protect the attached pedicle. Posterior Auricular Interpolation Flap Text: A decision was made to reconstruct the defect utilizing an interpolation axial flap and a staged reconstruction.  A telfa template was made of the defect.  This telfa template was then used to outline the posterior auricular interpolation flap.  The donor area for the pedicle flap was then injected with anesthesia.  The flap was excised through the skin and subcutaneous tissue down to the layer of the underlying musculature.  The pedicle flap was carefully excised within this deep plane to maintain its blood supply.  The edges of the donor site were undermined.   The donor site was closed in a primary fashion.  The pedicle was then rotated into position and sutured.  Once the tube was sutured into place, adequate blood supply was confirmed with blanching and refill.  The pedicle was then wrapped with xeroform gauze and dressed appropriately with a telfa and gauze bandage to ensure continued blood supply and protect the attached pedicle. Paramedian Forehead Flap Text: A decision was made to reconstruct the defect utilizing an interpolation axial flap and a staged reconstruction.  A telfa template was made of the defect.  This telfa template was then used to outline the paramedian forehead pedicle flap.  The donor area for the pedicle flap was then injected with anesthesia.  The flap was excised through the skin and subcutaneous tissue down to the layer of the underlying musculature.  The pedicle flap was carefully excised within this deep plane to maintain its blood supply.  The edges of the donor site were undermined.   The donor site was closed in a primary fashion.  The pedicle was then rotated into position and sutured.  Once the tube was sutured into place, adequate blood supply was confirmed with blanching and refill.  The pedicle was then wrapped with xeroform gauze and dressed appropriately with a telfa and gauze bandage to ensure continued blood supply and protect the attached pedicle. Abbe Flap (Upper To Lower Lip) Text: The defect of the lower lip was assessed and measured.  Given the location and size of the defect, an Abbe flap was deemed most appropriate. Using a sterile surgical marker, an appropriate Abbe flap was measured and drawn on the upper lip. Local anesthesia was then infiltrated.  A scalpel was then used to incise the upper lip through and through the skin, vermilion, muscle and mucosa, leaving the flap pedicled on the opposite side.  The flap was then rotated and transferred to the lower lip defect.  The flap was then sutured into place with a three layer technique, closing the orbicularis oris muscle layer with subcutaneous buried sutures, followed by a mucosal layer and an epidermal layer. Abbe Flap (Lower To Upper Lip) Text: The defect of the upper lip was assessed and measured.  Given the location and size of the defect, an Abbe flap was deemed most appropriate. Using a sterile surgical marker, an appropriate Abbe flap was measured and drawn on the lower lip. Local anesthesia was then infiltrated. A scalpel was then used to incise the upper lip through and through the skin, vermilion, muscle and mucosa, leaving the flap pedicled on the opposite side.  The flap was then rotated and transferred to the lower lip defect.  The flap was then sutured into place with a three layer technique, closing the orbicularis oris muscle layer with subcutaneous buried sutures, followed by a mucosal layer and an epidermal layer. Estlander Flap (Upper To Lower Lip) Text: The defect of the lower lip was assessed and measured.  Given the location and size of the defect, an Estlander flap was deemed most appropriate. Using a sterile surgical marker, an appropriate Estlander flap was measured and drawn on the upper lip. Local anesthesia was then infiltrated. A scalpel was then used to incise the lateral aspect of the flap, through skin, muscle and mucosa, leaving the flap pedicled medially.  The flap was then rotated and positioned to fill the lower lip defect.  The flap was then sutured into place with a three layer technique, closing the orbicularis oris muscle layer with subcutaneous buried sutures, followed by a mucosal layer and an epidermal layer. Lip Wedge Excision Repair Text: Given the location of the defect and the proximity to free margins a full thickness wedge repair was deemed most appropriate.  Using a sterile surgical marker, the appropriate repair was drawn incorporating the defect and placing the expected incisions perpendicular to the vermilion border.  The vermilion border was also meticulously outlined to ensure appropriate reapproximation during the repair.  The area thus outlined was incised through and through with a #15 scalpel blade.  The muscularis and dermis were reaproximated with deep sutures following hemostasis. Care was taken to realign the vermilion border before proceeding with the superficial closure.  Once the vermilion was realigned the superfical and mucosal closure was finished. Ftsg Text: The defect edges were debeveled with a #15 scalpel blade.  Given the location of the defect, shape of the defect and the proximity to free margins a full thickness skin graft was deemed most appropriate.  Using a sterile surgical marker, the primary defect shape was transferred to the donor site. The area thus outlined was incised deep to adipose tissue with a #15 scalpel blade.  The harvested graft was then trimmed of adipose tissue until only dermis and epidermis was left.  The skin margins of the secondary defect were undermined to an appropriate distance in all directions utilizing iris scissors.  The secondary defect was closed with interrupted buried subcutaneous sutures.  The skin edges were then re-apposed with running  sutures.  The skin graft was then placed in the primary defect and oriented appropriately. Split-Thickness Skin Graft Text: The defect edges were debeveled with a #15 scalpel blade.  Given the location of the defect, shape of the defect and the proximity to free margins a split thickness skin graft was deemed most appropriate.  Using a sterile surgical marker, the primary defect shape was transferred to the donor site. The split thickness graft was then harvested.  The skin graft was then placed in the primary defect and oriented appropriately. Pinch Graft Text: The defect edges were debeveled with a #15 scalpel blade. Given the location of the defect, shape of the defect and the proximity to free margins a pinch graft was deemed most appropriate. Using a sterile surgical marker, the primary defect shape was transferred to the donor site. The area thus outlined was incised deep to adipose tissue with a #15 scalpel blade.  The harvested graft was then trimmed of adipose tissue until only dermis and epidermis was left. The skin graft was then placed in the primary defect and oriented appropriately. Burow's Graft Text: The defect edges were debeveled with a #15 scalpel blade. Given the location of the defect, shape of the defect, the proximity to free margins and the presence of a standing cone deformity a Burow's skin graft was deemed most appropriate. The standing cone was removed and this tissue was then trimmed to the shape of the primary defect. The adipose tissue was also removed until only dermis and epidermis were left.  The skin graft was then placed in the primary defect and oriented appropriately. Cartilage Graft Text: The defect edges were debeveled with a #15 scalpel blade.  Given the location of the defect, shape of the defect, the fact the defect involved a full thickness cartilage defect a cartilage graft was deemed most appropriate.  An appropriate donor site was identified, cleansed, and anesthetized. The cartilage graft was then harvested and transferred to the recipient site, oriented appropriately and then sutured into place.  The secondary defect was then repaired using a primary closure. Composite Graft Text: The defect edges were debeveled with a #15 scalpel blade.  Given the location of the defect, shape of the defect, the proximity to free margins and the fact the defect was full thickness a composite graft was deemed most appropriate.  The defect was outline and then transferred to the donor site.  A full thickness graft was then excised from the donor site. The graft was then placed in the primary defect, oriented appropriately and then sutured into place.  The secondary defect was then repaired using a primary closure. Epidermal Autograft Text: The defect edges were debeveled with a #15 scalpel blade.  Given the location of the defect, shape of the defect and the proximity to free margins an epidermal autograft was deemed most appropriate.  Using a sterile surgical marker, the primary defect shape was transferred to the donor site. The epidermal graft was then harvested.  The skin graft was then placed in the primary defect and oriented appropriately. Dermal Autograft Text: The defect edges were debeveled with a #15 scalpel blade.  Given the location of the defect, shape of the defect and the proximity to free margins a dermal autograft was deemed most appropriate.  Using a sterile surgical marker, the primary defect shape was transferred to the donor site. The area thus outlined was incised deep to adipose tissue with a #15 scalpel blade.  The harvested graft was then trimmed of adipose and epidermal tissue until only dermis was left.  The skin graft was then placed in the primary defect and oriented appropriately. Skin Substitute Text: The defect edges were debeveled with a #15 scalpel blade. Given the location of the defect, shape of the defect and the proximity to free margins a skin substitute graft was deemed most appropriate.  The graft material was trimmed to fit the size of the defect. The graft was then placed in the primary defect and oriented appropriately. Tissue Cultured Epidermal Autograft Text: The defect edges were debeveled with a #15 scalpel blade.  Given the location of the defect, shape of the defect and the proximity to free margins a tissue cultured epidermal autograft was deemed most appropriate.  The graft was then trimmed to fit the size of the defect.  The graft was then placed in the primary defect and oriented appropriately. Xenograft Text: The defect edges were debeveled with a #15 scalpel blade.  Given the location of the defect, shape of the defect and the proximity to free margins a xenograft was deemed most appropriate.  The graft was then trimmed to fit the size of the defect.  The graft was then placed in the primary defect and oriented appropriately. Purse String (Intermediate) Text: Given the location of the defect and the characteristics of the surrounding skin a pursestring intermediate closure was deemed most appropriate.  Undermining was performed circumfirentially around the surgical defect.  A purstring suture was then placed and tightened. Purse String (Simple) Text: Given the location of the defect and the characteristics of the surrounding skin a purse string simple closure was deemed most appropriate.  Undermining was performed circumferentially around the surgical defect.  A purse string suture was then placed and tightened. Partial Purse String (Intermediate) Text: Given the location of the defect and the characteristics of the surrounding skin an intermediate purse string closure was deemed most appropriate.  Undermining was performed circumferentially around the surgical defect.  A purse string suture was then placed and tightened. Wound tension of the circular defect prevented complete closure of the wound. Partial Purse String (Simple) Text: Given the location of the defect and the characteristics of the surrounding skin a simple purse string closure was deemed most appropriate.  Undermining was performed circumferentially around the surgical defect.  A purse string suture was then placed and tightened. Wound tension of the circular defect prevented complete closure of the wound. Complex Repair And Single Advancement Flap Text: The defect edges were debeveled with a #15 scalpel blade.  The primary defect was closed partially with a complex linear closure.  Given the location of the remaining defect, shape of the defect and the proximity to free margins a single advancement flap was deemed most appropriate for complete closure of the defect.  Using a sterile surgical marker, an appropriate advancement flap was drawn incorporating the defect and placing the expected incisions within the relaxed skin tension lines where possible.    The area thus outlined was incised deep to adipose tissue with a #15 scalpel blade.  The skin margins were undermined to an appropriate distance in all directions utilizing iris scissors. Complex Repair And Double Advancement Flap Text: The defect edges were debeveled with a #15 scalpel blade.  The primary defect was closed partially with a complex linear closure.  Given the location of the remaining defect, shape of the defect and the proximity to free margins a double advancement flap was deemed most appropriate for complete closure of the defect.  Using a sterile surgical marker, an appropriate advancement flap was drawn incorporating the defect and placing the expected incisions within the relaxed skin tension lines where possible.    The area thus outlined was incised deep to adipose tissue with a #15 scalpel blade.  The skin margins were undermined to an appropriate distance in all directions utilizing iris scissors. Complex Repair And Modified Advancement Flap Text: The defect edges were debeveled with a #15 scalpel blade.  The primary defect was closed partially with a complex linear closure.  Given the location of the remaining defect, shape of the defect and the proximity to free margins a modified advancement flap was deemed most appropriate for complete closure of the defect.  Using a sterile surgical marker, an appropriate advancement flap was drawn incorporating the defect and placing the expected incisions within the relaxed skin tension lines where possible.    The area thus outlined was incised deep to adipose tissue with a #15 scalpel blade.  The skin margins were undermined to an appropriate distance in all directions utilizing iris scissors. Complex Repair And A-T Advancement Flap Text: The defect edges were debeveled with a #15 scalpel blade.  The primary defect was closed partially with a complex linear closure.  Given the location of the remaining defect, shape of the defect and the proximity to free margins an A-T advancement flap was deemed most appropriate for complete closure of the defect.  Using a sterile surgical marker, an appropriate advancement flap was drawn incorporating the defect and placing the expected incisions within the relaxed skin tension lines where possible.    The area thus outlined was incised deep to adipose tissue with a #15 scalpel blade.  The skin margins were undermined to an appropriate distance in all directions utilizing iris scissors. Complex Repair And O-T Advancement Flap Text: The defect edges were debeveled with a #15 scalpel blade.  The primary defect was closed partially with a complex linear closure.  Given the location of the remaining defect, shape of the defect and the proximity to free margins an O-T advancement flap was deemed most appropriate for complete closure of the defect.  Using a sterile surgical marker, an appropriate advancement flap was drawn incorporating the defect and placing the expected incisions within the relaxed skin tension lines where possible.    The area thus outlined was incised deep to adipose tissue with a #15 scalpel blade.  The skin margins were undermined to an appropriate distance in all directions utilizing iris scissors. Complex Repair And O-L Flap Text: The defect edges were debeveled with a #15 scalpel blade.  The primary defect was closed partially with a complex linear closure.  Given the location of the remaining defect, shape of the defect and the proximity to free margins an O-L flap was deemed most appropriate for complete closure of the defect.  Using a sterile surgical marker, an appropriate flap was drawn incorporating the defect and placing the expected incisions within the relaxed skin tension lines where possible.    The area thus outlined was incised deep to adipose tissue with a #15 scalpel blade.  The skin margins were undermined to an appropriate distance in all directions utilizing iris scissors. Complex Repair And Bilobe Flap Text: The defect edges were debeveled with a #15 scalpel blade.  The primary defect was closed partially with a complex linear closure.  Given the location of the remaining defect, shape of the defect and the proximity to free margins a bilobe flap was deemed most appropriate for complete closure of the defect.  Using a sterile surgical marker, an appropriate advancement flap was drawn incorporating the defect and placing the expected incisions within the relaxed skin tension lines where possible.    The area thus outlined was incised deep to adipose tissue with a #15 scalpel blade.  The skin margins were undermined to an appropriate distance in all directions utilizing iris scissors. Complex Repair And Melolabial Flap Text: The defect edges were debeveled with a #15 scalpel blade.  The primary defect was closed partially with a complex linear closure.  Given the location of the remaining defect, shape of the defect and the proximity to free margins a melolabial flap was deemed most appropriate for complete closure of the defect.  Using a sterile surgical marker, an appropriate advancement flap was drawn incorporating the defect and placing the expected incisions within the relaxed skin tension lines where possible.    The area thus outlined was incised deep to adipose tissue with a #15 scalpel blade.  The skin margins were undermined to an appropriate distance in all directions utilizing iris scissors. Complex Repair And Rotation Flap Text: The defect edges were debeveled with a #15 scalpel blade.  The primary defect was closed partially with a complex linear closure.  Given the location of the remaining defect, shape of the defect and the proximity to free margins a rotation flap was deemed most appropriate for complete closure of the defect.  Using a sterile surgical marker, an appropriate advancement flap was drawn incorporating the defect and placing the expected incisions within the relaxed skin tension lines where possible.    The area thus outlined was incised deep to adipose tissue with a #15 scalpel blade.  The skin margins were undermined to an appropriate distance in all directions utilizing iris scissors. Complex Repair And Rhombic Flap Text: The defect edges were debeveled with a #15 scalpel blade.  The primary defect was closed partially with a complex linear closure.  Given the location of the remaining defect, shape of the defect and the proximity to free margins a rhombic flap was deemed most appropriate for complete closure of the defect.  Using a sterile surgical marker, an appropriate advancement flap was drawn incorporating the defect and placing the expected incisions within the relaxed skin tension lines where possible.    The area thus outlined was incised deep to adipose tissue with a #15 scalpel blade.  The skin margins were undermined to an appropriate distance in all directions utilizing iris scissors. Complex Repair And Transposition Flap Text: The defect edges were debeveled with a #15 scalpel blade.  The primary defect was closed partially with a complex linear closure.  Given the location of the remaining defect, shape of the defect and the proximity to free margins a transposition flap was deemed most appropriate for complete closure of the defect.  Using a sterile surgical marker, an appropriate advancement flap was drawn incorporating the defect and placing the expected incisions within the relaxed skin tension lines where possible.    The area thus outlined was incised deep to adipose tissue with a #15 scalpel blade.  The skin margins were undermined to an appropriate distance in all directions utilizing iris scissors. Complex Repair And V-Y Plasty Text: The defect edges were debeveled with a #15 scalpel blade.  The primary defect was closed partially with a complex linear closure.  Given the location of the remaining defect, shape of the defect and the proximity to free margins a V-Y plasty was deemed most appropriate for complete closure of the defect.  Using a sterile surgical marker, an appropriate advancement flap was drawn incorporating the defect and placing the expected incisions within the relaxed skin tension lines where possible.    The area thus outlined was incised deep to adipose tissue with a #15 scalpel blade.  The skin margins were undermined to an appropriate distance in all directions utilizing iris scissors. Complex Repair And M Plasty Text: The defect edges were debeveled with a #15 scalpel blade.  The primary defect was closed partially with a complex linear closure.  Given the location of the remaining defect, shape of the defect and the proximity to free margins an M plasty was deemed most appropriate for complete closure of the defect.  Using a sterile surgical marker, an appropriate advancement flap was drawn incorporating the defect and placing the expected incisions within the relaxed skin tension lines where possible.    The area thus outlined was incised deep to adipose tissue with a #15 scalpel blade.  The skin margins were undermined to an appropriate distance in all directions utilizing iris scissors. Complex Repair And Double M Plasty Text: The defect edges were debeveled with a #15 scalpel blade.  The primary defect was closed partially with a complex linear closure.  Given the location of the remaining defect, shape of the defect and the proximity to free margins a double M plasty was deemed most appropriate for complete closure of the defect.  Using a sterile surgical marker, an appropriate advancement flap was drawn incorporating the defect and placing the expected incisions within the relaxed skin tension lines where possible.    The area thus outlined was incised deep to adipose tissue with a #15 scalpel blade.  The skin margins were undermined to an appropriate distance in all directions utilizing iris scissors. Complex Repair And W Plasty Text: The defect edges were debeveled with a #15 scalpel blade.  The primary defect was closed partially with a complex linear closure.  Given the location of the remaining defect, shape of the defect and the proximity to free margins a W plasty was deemed most appropriate for complete closure of the defect.  Using a sterile surgical marker, an appropriate advancement flap was drawn incorporating the defect and placing the expected incisions within the relaxed skin tension lines where possible.    The area thus outlined was incised deep to adipose tissue with a #15 scalpel blade.  The skin margins were undermined to an appropriate distance in all directions utilizing iris scissors. Complex Repair And Z Plasty Text: The defect edges were debeveled with a #15 scalpel blade.  The primary defect was closed partially with a complex linear closure.  Given the location of the remaining defect, shape of the defect and the proximity to free margins a Z plasty was deemed most appropriate for complete closure of the defect.  Using a sterile surgical marker, an appropriate advancement flap was drawn incorporating the defect and placing the expected incisions within the relaxed skin tension lines where possible.    The area thus outlined was incised deep to adipose tissue with a #15 scalpel blade.  The skin margins were undermined to an appropriate distance in all directions utilizing iris scissors. Complex Repair And Dorsal Nasal Flap Text: The defect edges were debeveled with a #15 scalpel blade.  The primary defect was closed partially with a complex linear closure.  Given the location of the remaining defect, shape of the defect and the proximity to free margins a dorsal nasal flap was deemed most appropriate for complete closure of the defect.  Using a sterile surgical marker, an appropriate flap was drawn incorporating the defect and placing the expected incisions within the relaxed skin tension lines where possible.    The area thus outlined was incised deep to adipose tissue with a #15 scalpel blade.  The skin margins were undermined to an appropriate distance in all directions utilizing iris scissors. Complex Repair And Ftsg Text: The defect edges were debeveled with a #15 scalpel blade.  The primary defect was closed partially with a complex linear closure.  Given the location of the defect, shape of the defect and the proximity to free margins a full thickness skin graft was deemed most appropriate to repair the remaining defect.  The graft was trimmed to fit the size of the remaining defect.  The graft was then placed in the primary defect, oriented appropriately, and sutured into place. Complex Repair And Burow's Graft Text: The defect edges were debeveled with a #15 scalpel blade.  The primary defect was closed partially with a complex linear closure.  Given the location of the defect, shape of the defect, the proximity to free margins and the presence of a standing cone deformity a Burow's graft was deemed most appropriate to repair the remaining defect.  The graft was trimmed to fit the size of the remaining defect.  The graft was then placed in the primary defect, oriented appropriately, and sutured into place. Complex Repair And Split-Thickness Skin Graft Text: The defect edges were debeveled with a #15 scalpel blade.  The primary defect was closed partially with a complex linear closure.  Given the location of the defect, shape of the defect and the proximity to free margins a split thickness skin graft was deemed most appropriate to repair the remaining defect.  The graft was trimmed to fit the size of the remaining defect.  The graft was then placed in the primary defect, oriented appropriately, and sutured into place. Complex Repair And Epidermal Autograft Text: The defect edges were debeveled with a #15 scalpel blade.  The primary defect was closed partially with a complex linear closure.  Given the location of the defect, shape of the defect and the proximity to free margins an epidermal autograft was deemed most appropriate to repair the remaining defect.  The graft was trimmed to fit the size of the remaining defect.  The graft was then placed in the primary defect, oriented appropriately, and sutured into place. Complex Repair And Dermal Autograft Text: The defect edges were debeveled with a #15 scalpel blade.  The primary defect was closed partially with a complex linear closure.  Given the location of the defect, shape of the defect and the proximity to free margins an dermal autograft was deemed most appropriate to repair the remaining defect.  The graft was trimmed to fit the size of the remaining defect.  The graft was then placed in the primary defect, oriented appropriately, and sutured into place. Complex Repair And Tissue Cultured Epidermal Autograft Text: The defect edges were debeveled with a #15 scalpel blade.  The primary defect was closed partially with a complex linear closure.  Given the location of the defect, shape of the defect and the proximity to free margins an tissue cultured epidermal autograft was deemed most appropriate to repair the remaining defect.  The graft was trimmed to fit the size of the remaining defect.  The graft was then placed in the primary defect, oriented appropriately, and sutured into place. Complex Repair And Xenograft Text: The defect edges were debeveled with a #15 scalpel blade.  The primary defect was closed partially with a complex linear closure.  Given the location of the defect, shape of the defect and the proximity to free margins a xenograft was deemed most appropriate to repair the remaining defect.  The graft was trimmed to fit the size of the remaining defect.  The graft was then placed in the primary defect, oriented appropriately, and sutured into place. Complex Repair And Skin Substitute Graft Text: The defect edges were debeveled with a #15 scalpel blade.  The primary defect was closed partially with a complex linear closure.  Given the location of the remaining defect, shape of the defect and the proximity to free margins a skin substitute graft was deemed most appropriate to repair the remaining defect.  The graft was trimmed to fit the size of the remaining defect.  The graft was then placed in the primary defect, oriented appropriately, and sutured into place. Include Anticoagulation In Mohs Note?: Please Select the Appropriate Response Path Notes (To The Dermatopathologist): Please check margins.  Specimen notched at 12:00. Consent was obtained from the patient. The risks and benefits to therapy were discussed in detail. Specifically, the risks of infection, scarring, bleeding, prolonged wound healing, incomplete removal, allergy to anesthesia, nerve injury and recurrence were addressed. Prior to the procedure, the treatment site was clearly identified and confirmed by the patient. All components of Universal Protocol/PAUSE Rule completed. Render Post-Care Instructions In Note?: yes Post-Care Instructions: I reviewed with the patient in detail post-care instructions. Patient is not to engage in any heavy lifting, exercise, or swimming for the next 14 days. Should the patient develop any fevers, chills, bleeding, severe pain patient will contact the office immediately. Home Suture Removal Text: Patient was provided a home suture removal kit and will remove their sutures at home.  If they have any questions or difficulties they will call the office. Where Do You Want The Question To Include Opioid Counseling Located?: Case Summary Tab Information: Selecting Yes will display possible errors in your note based on the variables you have selected. This validation is only offered as a suggestion for you. PLEASE NOTE THAT THE VALIDATION TEXT WILL BE REMOVED WHEN YOU FINALIZE YOUR NOTE. IF YOU WANT TO FAX A PRELIMINARY NOTE YOU WILL NEED TO TOGGLE THIS TO 'NO' IF YOU DO NOT WANT IT IN YOUR FAXED NOTE.